# Patient Record
Sex: FEMALE | Race: WHITE | Employment: OTHER | URBAN - METROPOLITAN AREA
[De-identification: names, ages, dates, MRNs, and addresses within clinical notes are randomized per-mention and may not be internally consistent; named-entity substitution may affect disease eponyms.]

---

## 2017-02-07 RX ORDER — PANTOPRAZOLE SODIUM 40 MG/1
TABLET, DELAYED RELEASE ORAL
Qty: 30 TABLET | Refills: 5 | Status: SHIPPED | OUTPATIENT
Start: 2017-02-07 | End: 2017-03-28 | Stop reason: SDUPTHER

## 2017-03-21 ENCOUNTER — OFFICE VISIT (OUTPATIENT)
Dept: INTERNAL MEDICINE | Age: 82
End: 2017-03-21

## 2017-03-21 VITALS
RESPIRATION RATE: 16 BRPM | WEIGHT: 171 LBS | SYSTOLIC BLOOD PRESSURE: 118 MMHG | DIASTOLIC BLOOD PRESSURE: 58 MMHG | HEART RATE: 72 BPM | BODY MASS INDEX: 30.29 KG/M2

## 2017-03-21 DIAGNOSIS — E11.9 TYPE 2 DIABETES MELLITUS WITHOUT COMPLICATION, WITHOUT LONG-TERM CURRENT USE OF INSULIN (HCC): Primary | ICD-10-CM

## 2017-03-21 DIAGNOSIS — G45.8 OTHER SPECIFIED TRANSIENT CEREBRAL ISCHEMIAS: ICD-10-CM

## 2017-03-21 DIAGNOSIS — Z00.00 PREVENTATIVE HEALTH CARE: ICD-10-CM

## 2017-03-21 DIAGNOSIS — I10 ESSENTIAL HYPERTENSION, BENIGN: ICD-10-CM

## 2017-03-21 DIAGNOSIS — I35.0 NONRHEUMATIC AORTIC VALVE STENOSIS: ICD-10-CM

## 2017-03-21 PROCEDURE — G8510 SCR DEP NEG, NO PLAN REQD: HCPCS | Performed by: INTERNAL MEDICINE

## 2017-03-21 PROCEDURE — 99214 OFFICE O/P EST MOD 30 MIN: CPT | Performed by: INTERNAL MEDICINE

## 2017-03-21 ASSESSMENT — ENCOUNTER SYMPTOMS
SHORTNESS OF BREATH: 1
TROUBLE SWALLOWING: 0
SORE THROAT: 0
COUGH: 1
SINUS PRESSURE: 1
EYES NEGATIVE: 1
GASTROINTESTINAL NEGATIVE: 1
ALLERGIC/IMMUNOLOGIC NEGATIVE: 1

## 2017-03-21 ASSESSMENT — PATIENT HEALTH QUESTIONNAIRE - PHQ9
SUM OF ALL RESPONSES TO PHQ QUESTIONS 1-9: 0
1. LITTLE INTEREST OR PLEASURE IN DOING THINGS: 0
SUM OF ALL RESPONSES TO PHQ9 QUESTIONS 1 & 2: 0
2. FEELING DOWN, DEPRESSED OR HOPELESS: 0

## 2017-03-28 RX ORDER — PRAVASTATIN SODIUM 40 MG
40 TABLET ORAL DAILY
Qty: 30 TABLET | Refills: 5 | Status: SHIPPED | OUTPATIENT
Start: 2017-03-28 | End: 2018-04-30 | Stop reason: SDUPTHER

## 2017-03-28 RX ORDER — PANTOPRAZOLE SODIUM 40 MG/1
40 TABLET, DELAYED RELEASE ORAL DAILY
Qty: 30 TABLET | Refills: 5 | Status: SHIPPED | OUTPATIENT
Start: 2017-03-28 | End: 2018-04-24 | Stop reason: SDUPTHER

## 2017-03-28 RX ORDER — ERGOCALCIFEROL 1.25 MG/1
50000 CAPSULE ORAL WEEKLY
Qty: 4 CAPSULE | Refills: 5 | Status: SHIPPED | OUTPATIENT
Start: 2017-03-28 | End: 2018-02-07 | Stop reason: SDUPTHER

## 2017-06-20 ENCOUNTER — OFFICE VISIT (OUTPATIENT)
Dept: INTERNAL MEDICINE | Age: 82
End: 2017-06-20

## 2017-06-20 VITALS
SYSTOLIC BLOOD PRESSURE: 124 MMHG | BODY MASS INDEX: 29.76 KG/M2 | DIASTOLIC BLOOD PRESSURE: 70 MMHG | WEIGHT: 168 LBS | HEART RATE: 64 BPM | RESPIRATION RATE: 16 BRPM

## 2017-06-20 DIAGNOSIS — E78.00 HYPERCHOLESTEREMIA: Primary | ICD-10-CM

## 2017-06-20 DIAGNOSIS — E11.9 TYPE 2 DIABETES MELLITUS WITHOUT COMPLICATION, WITHOUT LONG-TERM CURRENT USE OF INSULIN (HCC): ICD-10-CM

## 2017-06-20 DIAGNOSIS — I25.10 ATHEROSCLEROSIS OF CORONARY ARTERY OF NATIVE HEART WITHOUT ANGINA PECTORIS, UNSPECIFIED VESSEL OR LESION TYPE: ICD-10-CM

## 2017-06-20 DIAGNOSIS — I10 ESSENTIAL HYPERTENSION, BENIGN: ICD-10-CM

## 2017-06-20 DIAGNOSIS — J45.20 MILD INTERMITTENT ASTHMA WITHOUT COMPLICATION: ICD-10-CM

## 2017-06-20 DIAGNOSIS — E55.9 VITAMIN D DEFICIENCY: ICD-10-CM

## 2017-06-20 LAB
A/G RATIO: 1.5 (ref 1.1–2.2)
ALBUMIN SERPL-MCNC: 4.3 G/DL (ref 3.4–5)
ALP BLD-CCNC: 63 U/L (ref 40–129)
ALT SERPL-CCNC: 19 U/L (ref 10–40)
ANION GAP SERPL CALCULATED.3IONS-SCNC: 16 MMOL/L (ref 3–16)
AST SERPL-CCNC: 29 U/L (ref 15–37)
BILIRUB SERPL-MCNC: 1.5 MG/DL (ref 0–1)
BUN BLDV-MCNC: 11 MG/DL (ref 7–20)
CALCIUM SERPL-MCNC: 9.4 MG/DL (ref 8.3–10.6)
CHLORIDE BLD-SCNC: 102 MMOL/L (ref 99–110)
CHOLESTEROL, TOTAL: 157 MG/DL (ref 0–199)
CO2: 22 MMOL/L (ref 21–32)
CREAT SERPL-MCNC: <0.5 MG/DL (ref 0.6–1.2)
GFR AFRICAN AMERICAN: >60
GFR NON-AFRICAN AMERICAN: >60
GLOBULIN: 2.8 G/DL
GLUCOSE BLD-MCNC: 104 MG/DL (ref 70–99)
HCT VFR BLD CALC: 45 % (ref 36–48)
HDLC SERPL-MCNC: 46 MG/DL (ref 40–60)
HEMOGLOBIN: 14.9 G/DL (ref 12–16)
LDL CHOLESTEROL CALCULATED: 83 MG/DL
MCH RBC QN AUTO: 30.6 PG (ref 26–34)
MCHC RBC AUTO-ENTMCNC: 33.2 G/DL (ref 31–36)
MCV RBC AUTO: 92 FL (ref 80–100)
PDW BLD-RTO: 13.7 % (ref 12.4–15.4)
PLATELET # BLD: 81 K/UL (ref 135–450)
PMV BLD AUTO: 10 FL (ref 5–10.5)
POTASSIUM SERPL-SCNC: 4 MMOL/L (ref 3.5–5.1)
RBC # BLD: 4.89 M/UL (ref 4–5.2)
SODIUM BLD-SCNC: 140 MMOL/L (ref 136–145)
TOTAL PROTEIN: 7.1 G/DL (ref 6.4–8.2)
TRIGL SERPL-MCNC: 142 MG/DL (ref 0–150)
VITAMIN D 25-HYDROXY: 28.9 NG/ML
VLDLC SERPL CALC-MCNC: 28 MG/DL
WBC # BLD: 4.9 K/UL (ref 4–11)

## 2017-06-20 PROCEDURE — 36415 COLL VENOUS BLD VENIPUNCTURE: CPT | Performed by: INTERNAL MEDICINE

## 2017-06-20 PROCEDURE — 99214 OFFICE O/P EST MOD 30 MIN: CPT | Performed by: INTERNAL MEDICINE

## 2017-06-20 RX ORDER — IPRATROPIUM BROMIDE 42 UG/1
2 SPRAY, METERED NASAL 4 TIMES DAILY PRN
COMMUNITY
End: 2017-10-25 | Stop reason: SDUPTHER

## 2017-06-20 ASSESSMENT — ENCOUNTER SYMPTOMS
EYES NEGATIVE: 1
SINUS PRESSURE: 1
ALLERGIC/IMMUNOLOGIC NEGATIVE: 1
GASTROINTESTINAL NEGATIVE: 1
COUGH: 1
SHORTNESS OF BREATH: 1
TROUBLE SWALLOWING: 0
SORE THROAT: 0

## 2017-06-21 LAB
ESTIMATED AVERAGE GLUCOSE: 114 MG/DL
HBA1C MFR BLD: 5.6 %

## 2017-09-01 ENCOUNTER — OFFICE VISIT (OUTPATIENT)
Dept: INTERNAL MEDICINE | Age: 82
End: 2017-09-01

## 2017-09-01 VITALS
BODY MASS INDEX: 29.53 KG/M2 | HEART RATE: 57 BPM | DIASTOLIC BLOOD PRESSURE: 82 MMHG | SYSTOLIC BLOOD PRESSURE: 118 MMHG | HEIGHT: 64 IN | WEIGHT: 173 LBS | OXYGEN SATURATION: 98 %

## 2017-09-01 DIAGNOSIS — Z12.31 SCREENING MAMMOGRAM, ENCOUNTER FOR: ICD-10-CM

## 2017-09-01 DIAGNOSIS — E78.00 HYPERCHOLESTEREMIA: ICD-10-CM

## 2017-09-01 DIAGNOSIS — N20.0 NEPHROLITHIASIS: ICD-10-CM

## 2017-09-01 DIAGNOSIS — E11.9 TYPE 2 DIABETES MELLITUS WITHOUT COMPLICATION, WITHOUT LONG-TERM CURRENT USE OF INSULIN (HCC): ICD-10-CM

## 2017-09-01 DIAGNOSIS — G45.8 OTHER SPECIFIED TRANSIENT CEREBRAL ISCHEMIAS: ICD-10-CM

## 2017-09-01 DIAGNOSIS — Z95.2 S/P AVR (AORTIC VALVE REPLACEMENT): ICD-10-CM

## 2017-09-01 DIAGNOSIS — Z00.00 MEDICARE ANNUAL WELLNESS VISIT, SUBSEQUENT: Primary | ICD-10-CM

## 2017-09-01 DIAGNOSIS — H92.01 RIGHT EAR PAIN: ICD-10-CM

## 2017-09-01 PROCEDURE — G0439 PPPS, SUBSEQ VISIT: HCPCS | Performed by: INTERNAL MEDICINE

## 2017-09-01 ASSESSMENT — LIFESTYLE VARIABLES
HAS A RELATIVE, FRIEND, DOCTOR, OR ANOTHER HEALTH PROFESSIONAL EXPRESSED CONCERN ABOUT YOUR DRINKING OR SUGGESTED YOU CUT DOWN: 0
HOW OFTEN DO YOU HAVE A DRINK CONTAINING ALCOHOL: 1
HOW OFTEN DURING THE LAST YEAR HAVE YOU FAILED TO DO WHAT WAS NORMALLY EXPECTED FROM YOU BECAUSE OF DRINKING: 0
HOW OFTEN DURING THE LAST YEAR HAVE YOU HAD A FEELING OF GUILT OR REMORSE AFTER DRINKING: 0
HOW OFTEN DO YOU HAVE SIX OR MORE DRINKS ON ONE OCCASION: 0
AUDIT-C TOTAL SCORE: 1
HOW OFTEN DURING THE LAST YEAR HAVE YOU FOUND THAT YOU WERE NOT ABLE TO STOP DRINKING ONCE YOU HAD STARTED: 0
HOW OFTEN DURING THE LAST YEAR HAVE YOU NEEDED AN ALCOHOLIC DRINK FIRST THING IN THE MORNING TO GET YOURSELF GOING AFTER A NIGHT OF HEAVY DRINKING: 0
HOW OFTEN DURING THE LAST YEAR HAVE YOU BEEN UNABLE TO REMEMBER WHAT HAPPENED THE NIGHT BEFORE BECAUSE YOU HAD BEEN DRINKING: 0
HOW MANY STANDARD DRINKS CONTAINING ALCOHOL DO YOU HAVE ON A TYPICAL DAY: 0

## 2017-09-01 ASSESSMENT — ENCOUNTER SYMPTOMS
CHEST TIGHTNESS: 0
NAUSEA: 0
DIARRHEA: 0
SHORTNESS OF BREATH: 0
VOMITING: 0
COUGH: 0
ABDOMINAL PAIN: 0
BLOOD IN STOOL: 0

## 2017-09-01 ASSESSMENT — ANXIETY QUESTIONNAIRES: GAD7 TOTAL SCORE: 1

## 2017-09-01 ASSESSMENT — PATIENT HEALTH QUESTIONNAIRE - PHQ9: SUM OF ALL RESPONSES TO PHQ QUESTIONS 1-9: 0

## 2017-09-05 ENCOUNTER — OFFICE VISIT (OUTPATIENT)
Dept: CARDIOLOGY CLINIC | Age: 82
End: 2017-09-05

## 2017-09-05 VITALS
BODY MASS INDEX: 29.87 KG/M2 | DIASTOLIC BLOOD PRESSURE: 68 MMHG | WEIGHT: 174 LBS | HEART RATE: 72 BPM | SYSTOLIC BLOOD PRESSURE: 140 MMHG

## 2017-09-05 DIAGNOSIS — Z95.2 S/P AVR (AORTIC VALVE REPLACEMENT): ICD-10-CM

## 2017-09-05 DIAGNOSIS — I25.10 ATHEROSCLEROSIS OF CORONARY ARTERY OF NATIVE HEART WITHOUT ANGINA PECTORIS, UNSPECIFIED VESSEL OR LESION TYPE: Primary | ICD-10-CM

## 2017-09-05 PROCEDURE — 99213 OFFICE O/P EST LOW 20 MIN: CPT | Performed by: INTERNAL MEDICINE

## 2017-10-25 ENCOUNTER — NURSE ONLY (OUTPATIENT)
Dept: INTERNAL MEDICINE | Age: 82
End: 2017-10-25

## 2017-10-25 ENCOUNTER — TELEPHONE (OUTPATIENT)
Dept: INTERNAL MEDICINE | Age: 82
End: 2017-10-25

## 2017-10-25 DIAGNOSIS — Z23 NEED FOR INFLUENZA VACCINATION: Primary | ICD-10-CM

## 2017-10-25 PROCEDURE — G0008 ADMIN INFLUENZA VIRUS VAC: HCPCS | Performed by: INTERNAL MEDICINE

## 2017-10-25 PROCEDURE — 90662 IIV NO PRSV INCREASED AG IM: CPT | Performed by: INTERNAL MEDICINE

## 2017-10-25 RX ORDER — IPRATROPIUM BROMIDE 42 UG/1
2 SPRAY, METERED NASAL 4 TIMES DAILY PRN
Qty: 1 BOTTLE | Refills: 5 | Status: ON HOLD | OUTPATIENT
Start: 2017-10-25 | End: 2019-01-05 | Stop reason: CLARIF

## 2017-10-25 NOTE — PROGRESS NOTES
Vaccine Information Sheet, \"Influenza - Inactivated\"  given to Ryan Hernandez, or parent/legal guardian of  Ryan Hernandez and verbalized understanding. Patient responses:    Have you ever had a reaction to a flu vaccine? No  Are you able to eat eggs without adverse effects? Yes  Do you have any current illness? No  Have you ever had [place] Syndrome? No    Flu vaccine given per order. Please see immunization tab.

## 2018-01-02 ENCOUNTER — TELEPHONE (OUTPATIENT)
Dept: INTERNAL MEDICINE | Age: 83
End: 2018-01-02

## 2018-01-02 ENCOUNTER — OFFICE VISIT (OUTPATIENT)
Dept: INTERNAL MEDICINE | Age: 83
End: 2018-01-02

## 2018-01-02 VITALS
BODY MASS INDEX: 30.73 KG/M2 | SYSTOLIC BLOOD PRESSURE: 150 MMHG | OXYGEN SATURATION: 98 % | HEART RATE: 69 BPM | DIASTOLIC BLOOD PRESSURE: 80 MMHG | WEIGHT: 179 LBS

## 2018-01-02 DIAGNOSIS — E78.00 HYPERCHOLESTEREMIA: ICD-10-CM

## 2018-01-02 DIAGNOSIS — Z95.2 S/P AVR (AORTIC VALVE REPLACEMENT): ICD-10-CM

## 2018-01-02 DIAGNOSIS — E11.9 TYPE 2 DIABETES MELLITUS WITHOUT COMPLICATION, WITHOUT LONG-TERM CURRENT USE OF INSULIN (HCC): ICD-10-CM

## 2018-01-02 DIAGNOSIS — E11.9 TYPE 2 DIABETES MELLITUS WITHOUT COMPLICATION, WITHOUT LONG-TERM CURRENT USE OF INSULIN (HCC): Primary | ICD-10-CM

## 2018-01-02 DIAGNOSIS — I25.10 ATHEROSCLEROSIS OF CORONARY ARTERY OF NATIVE HEART WITHOUT ANGINA PECTORIS, UNSPECIFIED VESSEL OR LESION TYPE: ICD-10-CM

## 2018-01-02 DIAGNOSIS — I10 ESSENTIAL HYPERTENSION, BENIGN: ICD-10-CM

## 2018-01-02 LAB
A/G RATIO: 1.6 (ref 1.1–2.2)
ALBUMIN SERPL-MCNC: 4.3 G/DL (ref 3.4–5)
ALP BLD-CCNC: 73 U/L (ref 40–129)
ALT SERPL-CCNC: 28 U/L (ref 10–40)
ANION GAP SERPL CALCULATED.3IONS-SCNC: 13 MMOL/L (ref 3–16)
AST SERPL-CCNC: 36 U/L (ref 15–37)
BILIRUB SERPL-MCNC: 1.1 MG/DL (ref 0–1)
BUN BLDV-MCNC: 9 MG/DL (ref 7–20)
CALCIUM SERPL-MCNC: 9.3 MG/DL (ref 8.3–10.6)
CHLORIDE BLD-SCNC: 103 MMOL/L (ref 99–110)
CHOLESTEROL, TOTAL: 161 MG/DL (ref 0–199)
CO2: 26 MMOL/L (ref 21–32)
CREAT SERPL-MCNC: 0.5 MG/DL (ref 0.6–1.2)
GFR AFRICAN AMERICAN: >60
GFR NON-AFRICAN AMERICAN: >60
GLOBULIN: 2.7 G/DL
GLUCOSE BLD-MCNC: 119 MG/DL (ref 70–99)
HCT VFR BLD CALC: 45.3 % (ref 36–48)
HDLC SERPL-MCNC: 39 MG/DL (ref 40–60)
HEMOGLOBIN: 15.4 G/DL (ref 12–16)
LDL CHOLESTEROL CALCULATED: 85 MG/DL
MCH RBC QN AUTO: 31.5 PG (ref 26–34)
MCHC RBC AUTO-ENTMCNC: 34.1 G/DL (ref 31–36)
MCV RBC AUTO: 92.4 FL (ref 80–100)
PDW BLD-RTO: 14.1 % (ref 12.4–15.4)
PLATELET # BLD: 87 K/UL (ref 135–450)
PMV BLD AUTO: 10.1 FL (ref 5–10.5)
POTASSIUM SERPL-SCNC: 5.2 MMOL/L (ref 3.5–5.1)
RBC # BLD: 4.91 M/UL (ref 4–5.2)
SODIUM BLD-SCNC: 142 MMOL/L (ref 136–145)
TOTAL PROTEIN: 7 G/DL (ref 6.4–8.2)
TRIGL SERPL-MCNC: 183 MG/DL (ref 0–150)
VLDLC SERPL CALC-MCNC: 37 MG/DL
WBC # BLD: 5.5 K/UL (ref 4–11)

## 2018-01-02 PROCEDURE — 99214 OFFICE O/P EST MOD 30 MIN: CPT | Performed by: INTERNAL MEDICINE

## 2018-01-02 RX ORDER — AMLODIPINE BESYLATE 5 MG/1
5 TABLET ORAL DAILY
Qty: 30 TABLET | Refills: 2 | Status: SHIPPED | OUTPATIENT
Start: 2018-01-02 | End: 2018-01-05 | Stop reason: SDUPTHER

## 2018-01-02 ASSESSMENT — ENCOUNTER SYMPTOMS
SHORTNESS OF BREATH: 0
CHEST TIGHTNESS: 0
ABDOMINAL PAIN: 0
NAUSEA: 0
VOMITING: 0

## 2018-01-02 NOTE — PROGRESS NOTES
Outpatient Note for established Patient - regular Visit    History Obtained From:  patient, electronic medical record  Is the patient new to me ? - No    HISTORY OF PRESENT ILLNESS:   The patient is a 80 y.o. female who is here today for :    1) Diabetes Mellitus  Pt denies CP/SOB/palpitations/abdominal pain/N/V. Last HbA1C:  Lab Results   Component Value Date    LABA1C 5.6 06/20/2017    LABA1C 5.7 12/20/2016    LABA1C 5.7 05/17/2016   :        Target A1C : <7.0-7.5  Pt checks glucose?  Occasionally (usuall number ~110)      Pt is compliant with meds: Yes: Victoza     Hypoglycemia events(Sx or low readings) : No     Lifestyle:   Physical activity: mild physical activity       Known target organ damage:   - CVD: Yes   - Nephropathy: No         Lab Results   Component Value Date    GFR >60 10/09/2012    GFR >60 04/30/2012    GFR >60 11/18/2011        Urine albumin/Cr I will order    - Diabetic retinopathy:No   Last eye specialist exam: this year    - Peripheral/Autonomic neuropathy: No     Foot exam performed: Yes    Pt on HIGH DOSE statin ?- mod dose       2) Hypertension:   BP readings at home: no    Pt is compliant with medications- Yes   Associated Sx (headache/ vision changes etc)- No      3) Hypercholesterolemia  No components found for: CHLPL  Lab Results   Component Value Date    TRIG 142 06/20/2017     Lab Results   Component Value Date    HDL 46 06/20/2017     Lab Results   Component Value Date    LDLCALC 83 06/20/2017     Lab Results   Component Value Date    LABVLDL 28 06/20/2017     4) S/p Aortic valve replacement, on Aspirin   History of aortic valve replacement she denies any symptoms of chest pain or short of breath she denies paroxysmal nocturnal dyspnea orthopnea or dyspnea on exertion    Past Medical History:        Diagnosis Date    Allergic rhinitis, cause unspecified     Allergic urticaria     Angioneurotic edema not elsewhere classified     Aortic stenosis 12/9/2011    dx 11/2007 - echo 3/28/17   Laurie Betts MD   Liraglutide (VICTOZA) 18 MG/3ML SOPN SC injection DIAL AND INJECT SUBCUTANEOUSLY 1.2MG DAILY 3/28/17   Laurie Betts MD   Biotin (BIOTIN 5000) 5 MG CAPS Take 1 capsule by mouth daily    Historical Provider, MD   [address] [facility] [place] [facility] [place] strip TEST BLOOD SUGAR ONE TIME A DAY OR AS DIRECTED BY PHYSICIAN. 5/20/15   Laurie Betts MD   aspirin 81 MG tablet Take 81 mg by mouth daily. Historical Provider, MD       REVIEW OF SYSTEMS:  Review of Systems   Constitutional: Negative for chills and fever. Respiratory: Negative for chest tightness and shortness of breath. Cardiovascular: Negative for chest pain. Gastrointestinal: Negative for abdominal pain, nausea and vomiting. Physical Exam:      Vitals: BP (!) 140/80 (Site: Right Arm, Position: Sitting, Cuff Size: Small Adult)   Pulse 69   Wt 179 lb (81.2 kg)   SpO2 98%   BMI 30.73 kg/m²     Body mass index is 30.73 kg/m². Wt Readings from Last 3 Encounters:   01/02/18 179 lb (81.2 kg)   09/05/17 174 lb (78.9 kg)   09/01/17 173 lb (78.5 kg)     Physical Exam   Constitutional: She is oriented to person, place, and time. She appears well-developed and well-nourished. No distress. HENT:   Head: Normocephalic and atraumatic. Right Ear: External ear normal.   Left Ear: External ear normal.   Mouth/Throat: Oropharynx is clear and moist. No oropharyngeal exudate. Eyes: Conjunctivae and EOM are normal. Right eye exhibits no discharge. Left eye exhibits no discharge. No scleral icterus. Neck: Normal range of motion. Neck supple. Cardiovascular: Normal rate and normal heart sounds. No murmur heard. Pulmonary/Chest: Effort normal and breath sounds normal. No respiratory distress. She has no wheezes. She has no rales. Abdominal: Soft. There is no tenderness. Musculoskeletal: Normal range of motion. She exhibits no edema. Feet:  No sensory- motor deficit  No wounds/callous/ cuts or rash  Nails: trimmed.    I recommended daily foot exam, feet protection. Lymphadenopathy:     She has no cervical adenopathy. Neurological: She is alert and oriented to person, place, and time. She has normal reflexes. She exhibits normal muscle tone. Skin: No rash noted. She is not diaphoretic. No erythema. Psychiatric: She has a normal mood and affect. Her behavior is normal. Thought content normal.        Assessment/Plan:   Ada was seen today for hyperlipidemia and diabetes. Diagnoses and all orders for this visit:    Type 2 diabetes mellitus without complication, without long-term current use of insulin (HCC)  Well controlled- no changes in medication today. -     Hemoglobin A1C; Future  -     Lipid panel; Future  -     HM DIABETES FOOT EXAM  -     Microalbumin / creatinine urine ratio; Future  -     COMPREHENSIVE METABOLIC PANEL; Future  -     CBC; Future    Hypertension  Not controlled. She will recheck at home and call with results   -     Lipid panel; Future  -     COMPREHENSIVE METABOLIC PANEL; Future  -     CBC; Future    Hypercholesteremia  She is on Aspirin, BB   Well controlled- no changes in medication today. -     Lipid panel; Future    Atherosclerosis of coronary artery of native heart without angina pectoris, unspecified vessel or lesion type  -     Lipid panel; Future    S/P AVR (aortic valve replacement)  -     COMPREHENSIVE METABOLIC PANEL; Future  -     CBC; Future    Other orders  -     Cancel: MICROALBUMIN, URINE, 24 HOUR; Future      Quality & Risk Score Accuracy - MEDICARE ADVANTAGE    Last edited 01/02/18 08:43 EST by Pina Flor MD       - Patient was encouraged to call the office or be seen with MD for any worsening or lack    of improvement in his symptoms.   - Anticipated follow up in 4 month(s)    Additional patients instructions (if given):   Patient Instructions   1. Recheck blood pressure after taking medications.    Please call me if it is higher than 140/80       Pina Flor M.D.

## 2018-01-02 NOTE — TELEPHONE ENCOUNTER
Pt states Dr wanted her to call with BP. Pt took medication at 09:30am and then took BP around 11:30 and was 166/91. Pt states BP is not going down.  Please advise

## 2018-01-03 ENCOUNTER — OFFICE VISIT (OUTPATIENT)
Dept: CARDIOLOGY CLINIC | Age: 83
End: 2018-01-03

## 2018-01-03 VITALS
SYSTOLIC BLOOD PRESSURE: 138 MMHG | BODY MASS INDEX: 30.66 KG/M2 | HEART RATE: 74 BPM | WEIGHT: 178.6 LBS | DIASTOLIC BLOOD PRESSURE: 64 MMHG

## 2018-01-03 DIAGNOSIS — I10 ESSENTIAL HYPERTENSION, BENIGN: Primary | ICD-10-CM

## 2018-01-03 DIAGNOSIS — E78.00 HYPERCHOLESTEREMIA: ICD-10-CM

## 2018-01-03 DIAGNOSIS — Z95.2 S/P AVR (AORTIC VALVE REPLACEMENT): ICD-10-CM

## 2018-01-03 DIAGNOSIS — I25.10 ATHEROSCLEROSIS OF CORONARY ARTERY OF NATIVE HEART WITHOUT ANGINA PECTORIS, UNSPECIFIED VESSEL OR LESION TYPE: ICD-10-CM

## 2018-01-03 LAB
ESTIMATED AVERAGE GLUCOSE: 134.1 MG/DL
HBA1C MFR BLD: 6.3 %

## 2018-01-03 PROCEDURE — 99214 OFFICE O/P EST MOD 30 MIN: CPT | Performed by: NURSE PRACTITIONER

## 2018-01-03 NOTE — PROGRESS NOTES
SURGERY  1977    Disc  adjustment    CARDIAC CATHETERIZATION  6/6/2013     Dr. Britni Coates - coronaries WNL , LVEF WNL     CATARACT REMOVAL  5/2012    Dr. Jessie Myers - bilateral    Maria L Sensing    Dr. Paxton Sheppard    COLONOSCOPY  03/30/2011    DR. Mello  - Internal hemorrhoids, sigmoid diverticulosis, hyperplastic polyps. Repeat in five  years.  COLONOSCOPY  02/23/2009    nternal hemorrhoids, Diverticulosis, Colon polyps. Repeat two years    COLONOSCOPY  5/8/2013     Dr. Joseph Brunson  - internal hemorrhoids , diverticulosis and polyps     COLONOSCOPY  5/4/2015, 3/7/2016    - hemorrhoids, diverticulosis, hyperplastic colon polyps, repeat in 1 year   Harry Strong  [id number]     DR. Dacosta - CYSTOSCOPY RETROGRADE, RIGHT STENT PLACEMENT    OTHER SURGICAL HISTORY  6/27/2013    Dr. Stefanie Maynard - [facility] URETEROSCOPY, RENAL STONE EXTRACTION    UPPER GASTROINTESTINAL ENDOSCOPY  3/7/2016    gastritis and esophagitis      Family History   Problem Relation Age of Onset    Heart Disease Father     Cancer Sister      pancreatic cancer    Heart Disease Brother     Heart Disease Brother     Heart Disease Brother      Social History   Substance Use Topics    Smoking status: Never Smoker    Smokeless tobacco: Never Used    Alcohol use 0.0 oz/week      Comment: Occasional glass of wine     Allergies:Actos [pioglitazone hydrochloride]; Lisinopril; Penicillins; Lipitor; Zocor [simvastatin]; and Glucophage [metformin hydrochloride]    Review of Systems  General: No changes in weight, fatigue, or night sweats. HEENT: No blurry or decreased vision. No changes in hearing, nasal discharge or sore throat. Cardiovascular:  See HPI. Respiratory: No cough, hemoptysis, or wheezing.  + history of asthma. Gastrointestinal:  No abdominal pain, hematochezia, melana, constipation, diarrhea, or history of GI ulcers. Genito-Urinary: No dysuria or hematuria. No urgency or polyuria.   Musculoskeletal:  No complaints

## 2018-01-04 DIAGNOSIS — D69.1 THROMBOCYTOPATHIA (HCC): Primary | ICD-10-CM

## 2018-01-05 DIAGNOSIS — I10 ESSENTIAL HYPERTENSION, BENIGN: ICD-10-CM

## 2018-01-05 RX ORDER — AMLODIPINE BESYLATE 10 MG/1
10 TABLET ORAL DAILY
Qty: 30 TABLET | Refills: 1 | Status: SHIPPED | OUTPATIENT
Start: 2018-01-05 | End: 2018-02-28 | Stop reason: SDUPTHER

## 2018-01-24 ENCOUNTER — OFFICE VISIT (OUTPATIENT)
Dept: INTERNAL MEDICINE | Age: 83
End: 2018-01-24

## 2018-01-24 VITALS
HEIGHT: 64 IN | HEART RATE: 67 BPM | BODY MASS INDEX: 30.22 KG/M2 | SYSTOLIC BLOOD PRESSURE: 120 MMHG | OXYGEN SATURATION: 98 % | DIASTOLIC BLOOD PRESSURE: 60 MMHG | WEIGHT: 177 LBS

## 2018-01-24 DIAGNOSIS — E78.00 HYPERCHOLESTEREMIA: ICD-10-CM

## 2018-01-24 DIAGNOSIS — I10 ESSENTIAL HYPERTENSION, BENIGN: Primary | ICD-10-CM

## 2018-01-24 DIAGNOSIS — M89.8X1 SHOULDER BLADE PAIN: ICD-10-CM

## 2018-01-24 PROCEDURE — 99213 OFFICE O/P EST LOW 20 MIN: CPT | Performed by: INTERNAL MEDICINE

## 2018-01-24 ASSESSMENT — ENCOUNTER SYMPTOMS
CHEST TIGHTNESS: 0
SHORTNESS OF BREATH: 0
ABDOMINAL PAIN: 0
VOMITING: 0
NAUSEA: 0

## 2018-02-07 DIAGNOSIS — E55.9 VITAMIN D DEFICIENCY: Primary | ICD-10-CM

## 2018-02-07 RX ORDER — ERGOCALCIFEROL 1.25 MG/1
50000 CAPSULE ORAL WEEKLY
Qty: 4 CAPSULE | Refills: 5 | Status: SHIPPED | OUTPATIENT
Start: 2018-02-07 | End: 2018-04-23 | Stop reason: SDUPTHER

## 2018-02-28 DIAGNOSIS — I10 ESSENTIAL HYPERTENSION, BENIGN: ICD-10-CM

## 2018-02-28 RX ORDER — AMLODIPINE BESYLATE 10 MG/1
10 TABLET ORAL DAILY
Qty: 30 TABLET | Refills: 5 | Status: SHIPPED | OUTPATIENT
Start: 2018-02-28 | End: 2018-05-14 | Stop reason: DRUGHIGH

## 2018-04-23 DIAGNOSIS — E55.9 VITAMIN D DEFICIENCY: ICD-10-CM

## 2018-04-23 RX ORDER — ERGOCALCIFEROL 1.25 MG/1
50000 CAPSULE ORAL WEEKLY
Qty: 4 CAPSULE | Refills: 5 | Status: SHIPPED | OUTPATIENT
Start: 2018-04-23 | End: 2019-02-27 | Stop reason: SDUPTHER

## 2018-04-24 ENCOUNTER — OFFICE VISIT (OUTPATIENT)
Dept: INTERNAL MEDICINE | Age: 83
End: 2018-04-24

## 2018-04-24 VITALS
HEART RATE: 72 BPM | OXYGEN SATURATION: 97 % | DIASTOLIC BLOOD PRESSURE: 64 MMHG | WEIGHT: 176 LBS | SYSTOLIC BLOOD PRESSURE: 122 MMHG | BODY MASS INDEX: 30.21 KG/M2

## 2018-04-24 DIAGNOSIS — I25.10 ATHEROSCLEROSIS OF CORONARY ARTERY OF NATIVE HEART WITHOUT ANGINA PECTORIS, UNSPECIFIED VESSEL OR LESION TYPE: ICD-10-CM

## 2018-04-24 DIAGNOSIS — E78.00 HYPERCHOLESTEREMIA: ICD-10-CM

## 2018-04-24 DIAGNOSIS — I10 ESSENTIAL HYPERTENSION, BENIGN: Primary | ICD-10-CM

## 2018-04-24 DIAGNOSIS — E11.9 TYPE 2 DIABETES MELLITUS WITHOUT COMPLICATION, WITHOUT LONG-TERM CURRENT USE OF INSULIN (HCC): ICD-10-CM

## 2018-04-24 DIAGNOSIS — M25.552 LEFT HIP PAIN: ICD-10-CM

## 2018-04-24 DIAGNOSIS — K27.9 PEPTIC ULCER: Primary | ICD-10-CM

## 2018-04-24 DIAGNOSIS — Z95.2 S/P AVR (AORTIC VALVE REPLACEMENT): ICD-10-CM

## 2018-04-24 PROCEDURE — 99214 OFFICE O/P EST MOD 30 MIN: CPT | Performed by: INTERNAL MEDICINE

## 2018-04-24 RX ORDER — PANTOPRAZOLE SODIUM 40 MG/1
40 TABLET, DELAYED RELEASE ORAL DAILY
Qty: 30 TABLET | Refills: 5 | Status: SHIPPED | OUTPATIENT
Start: 2018-04-24 | End: 2018-04-27 | Stop reason: SDUPTHER

## 2018-04-24 ASSESSMENT — ENCOUNTER SYMPTOMS
SHORTNESS OF BREATH: 0
NAUSEA: 0
ABDOMINAL PAIN: 0
VOMITING: 0
CHEST TIGHTNESS: 0

## 2018-04-27 ENCOUNTER — TELEPHONE (OUTPATIENT)
Dept: INTERNAL MEDICINE | Age: 83
End: 2018-04-27

## 2018-04-27 DIAGNOSIS — K27.9 PEPTIC ULCER: ICD-10-CM

## 2018-04-27 RX ORDER — PANTOPRAZOLE SODIUM 40 MG/1
40 TABLET, DELAYED RELEASE ORAL DAILY
Qty: 30 TABLET | Refills: 5 | Status: SHIPPED | OUTPATIENT
Start: 2018-04-27 | End: 2018-04-30 | Stop reason: SDUPTHER

## 2018-04-30 ENCOUNTER — TELEPHONE (OUTPATIENT)
Dept: INTERNAL MEDICINE | Age: 83
End: 2018-04-30

## 2018-04-30 DIAGNOSIS — E78.00 HYPERCHOLESTEREMIA: Primary | ICD-10-CM

## 2018-04-30 RX ORDER — PRAVASTATIN SODIUM 40 MG
40 TABLET ORAL DAILY
Qty: 30 TABLET | Refills: 5 | Status: SHIPPED | OUTPATIENT
Start: 2018-04-30 | End: 2018-07-30 | Stop reason: SDUPTHER

## 2018-04-30 RX ORDER — PANTOPRAZOLE SODIUM 40 MG/1
40 TABLET, DELAYED RELEASE ORAL DAILY
Qty: 30 TABLET | Refills: 5 | Status: SHIPPED | OUTPATIENT
Start: 2018-04-30 | End: 2018-04-30 | Stop reason: CLARIF

## 2018-05-14 ENCOUNTER — OFFICE VISIT (OUTPATIENT)
Dept: INTERNAL MEDICINE | Age: 83
End: 2018-05-14

## 2018-05-14 VITALS
WEIGHT: 174 LBS | BODY MASS INDEX: 29.87 KG/M2 | OXYGEN SATURATION: 96 % | DIASTOLIC BLOOD PRESSURE: 62 MMHG | SYSTOLIC BLOOD PRESSURE: 122 MMHG | HEART RATE: 66 BPM

## 2018-05-14 DIAGNOSIS — M79.89 LEG SWELLING: Primary | ICD-10-CM

## 2018-05-14 DIAGNOSIS — R06.09 EXERTIONAL DYSPNEA: ICD-10-CM

## 2018-05-14 DIAGNOSIS — Z95.2 S/P AVR (AORTIC VALVE REPLACEMENT): ICD-10-CM

## 2018-05-14 DIAGNOSIS — I25.10 ATHEROSCLEROSIS OF CORONARY ARTERY OF NATIVE HEART WITHOUT ANGINA PECTORIS, UNSPECIFIED VESSEL OR LESION TYPE: ICD-10-CM

## 2018-05-14 DIAGNOSIS — I10 ESSENTIAL HYPERTENSION, BENIGN: ICD-10-CM

## 2018-05-14 PROCEDURE — 99214 OFFICE O/P EST MOD 30 MIN: CPT | Performed by: INTERNAL MEDICINE

## 2018-05-14 RX ORDER — AMLODIPINE BESYLATE 10 MG/1
5 TABLET ORAL DAILY
Qty: 30 TABLET | Refills: 0 | Status: SHIPPED | OUTPATIENT
Start: 2018-05-14 | End: 2018-05-22 | Stop reason: SDUPTHER

## 2018-05-14 ASSESSMENT — ENCOUNTER SYMPTOMS
VOMITING: 0
CHEST TIGHTNESS: 0
ABDOMINAL PAIN: 0
SHORTNESS OF BREATH: 1
NAUSEA: 0
BLOOD IN STOOL: 0

## 2018-05-22 ENCOUNTER — HOSPITAL ENCOUNTER (OUTPATIENT)
Dept: NON INVASIVE DIAGNOSTICS | Age: 83
Discharge: HOME | End: 2018-05-22
Attending: INTERNAL MEDICINE | Admitting: INTERNAL MEDICINE

## 2018-05-22 DIAGNOSIS — I10 ESSENTIAL (PRIMARY) HYPERTENSION: ICD-10-CM

## 2018-05-22 DIAGNOSIS — I10 ESSENTIAL HYPERTENSION, BENIGN: ICD-10-CM

## 2018-05-22 LAB
LV EF: 63 %
LVEF MODALITY: NORMAL

## 2018-05-23 RX ORDER — AMLODIPINE BESYLATE 10 MG/1
5 TABLET ORAL DAILY
Qty: 45 TABLET | Refills: 3 | Status: ON HOLD | OUTPATIENT
Start: 2018-05-23 | End: 2019-01-05 | Stop reason: CLARIF

## 2018-06-26 DIAGNOSIS — E11.9 TYPE 2 DIABETES MELLITUS WITHOUT COMPLICATION, WITHOUT LONG-TERM CURRENT USE OF INSULIN (HCC): Primary | ICD-10-CM

## 2018-07-30 DIAGNOSIS — E78.00 HYPERCHOLESTEREMIA: ICD-10-CM

## 2018-07-30 RX ORDER — PRAVASTATIN SODIUM 40 MG
40 TABLET ORAL DAILY
Qty: 30 TABLET | Refills: 5 | Status: SHIPPED | OUTPATIENT
Start: 2018-07-30 | End: 2019-01-08 | Stop reason: SDUPTHER

## 2018-08-14 DIAGNOSIS — E11.9 TYPE 2 DIABETES MELLITUS WITHOUT COMPLICATION, WITHOUT LONG-TERM CURRENT USE OF INSULIN (HCC): ICD-10-CM

## 2018-08-14 DIAGNOSIS — I10 ESSENTIAL HYPERTENSION, BENIGN: ICD-10-CM

## 2018-08-14 DIAGNOSIS — E78.00 HYPERCHOLESTEREMIA: ICD-10-CM

## 2018-08-14 LAB
A/G RATIO: 1.5 (ref 1.1–2.2)
ALBUMIN SERPL-MCNC: 4.3 G/DL (ref 3.4–5)
ALP BLD-CCNC: 68 U/L (ref 40–129)
ALT SERPL-CCNC: 24 U/L (ref 10–40)
ANION GAP SERPL CALCULATED.3IONS-SCNC: 11 MMOL/L (ref 3–16)
AST SERPL-CCNC: 35 U/L (ref 15–37)
BASOPHILS ABSOLUTE: 0 K/UL (ref 0–0.2)
BASOPHILS RELATIVE PERCENT: 0.7 %
BILIRUB SERPL-MCNC: 1.2 MG/DL (ref 0–1)
BUN BLDV-MCNC: 9 MG/DL (ref 7–20)
CALCIUM SERPL-MCNC: 9.6 MG/DL (ref 8.3–10.6)
CHLORIDE BLD-SCNC: 105 MMOL/L (ref 99–110)
CHOLESTEROL, TOTAL: 159 MG/DL (ref 0–199)
CO2: 28 MMOL/L (ref 21–32)
CREAT SERPL-MCNC: 0.6 MG/DL (ref 0.6–1.2)
EOSINOPHILS ABSOLUTE: 0.2 K/UL (ref 0–0.6)
EOSINOPHILS RELATIVE PERCENT: 4.2 %
ESTIMATED AVERAGE GLUCOSE: 125.5 MG/DL
GFR AFRICAN AMERICAN: >60
GFR NON-AFRICAN AMERICAN: >60
GLOBULIN: 2.8 G/DL
GLUCOSE BLD-MCNC: 109 MG/DL (ref 70–99)
HBA1C MFR BLD: 6 %
HCT VFR BLD CALC: 46.3 % (ref 36–48)
HDLC SERPL-MCNC: 46 MG/DL (ref 40–60)
HEMOGLOBIN: 15.7 G/DL (ref 12–16)
LDL CHOLESTEROL CALCULATED: 83 MG/DL
LYMPHOCYTES ABSOLUTE: 1.6 K/UL (ref 1–5.1)
LYMPHOCYTES RELATIVE PERCENT: 26.8 %
MCH RBC QN AUTO: 31 PG (ref 26–34)
MCHC RBC AUTO-ENTMCNC: 33.8 G/DL (ref 31–36)
MCV RBC AUTO: 91.6 FL (ref 80–100)
MONOCYTES ABSOLUTE: 0.4 K/UL (ref 0–1.3)
MONOCYTES RELATIVE PERCENT: 7 %
NEUTROPHILS ABSOLUTE: 3.6 K/UL (ref 1.7–7.7)
NEUTROPHILS RELATIVE PERCENT: 61.3 %
PDW BLD-RTO: 13.9 % (ref 12.4–15.4)
PLATELET # BLD: 98 K/UL (ref 135–450)
PMV BLD AUTO: 9.8 FL (ref 5–10.5)
POTASSIUM SERPL-SCNC: 4.4 MMOL/L (ref 3.5–5.1)
RBC # BLD: 5.06 M/UL (ref 4–5.2)
SODIUM BLD-SCNC: 144 MMOL/L (ref 136–145)
TOTAL PROTEIN: 7.1 G/DL (ref 6.4–8.2)
TRIGL SERPL-MCNC: 151 MG/DL (ref 0–150)
VLDLC SERPL CALC-MCNC: 30 MG/DL
WBC # BLD: 5.8 K/UL (ref 4–11)

## 2018-08-21 ENCOUNTER — OFFICE VISIT (OUTPATIENT)
Dept: INTERNAL MEDICINE | Age: 83
End: 2018-08-21

## 2018-08-21 VITALS
OXYGEN SATURATION: 96 % | DIASTOLIC BLOOD PRESSURE: 68 MMHG | WEIGHT: 171 LBS | BODY MASS INDEX: 29.35 KG/M2 | SYSTOLIC BLOOD PRESSURE: 114 MMHG | HEART RATE: 62 BPM

## 2018-08-21 DIAGNOSIS — G45.8 OTHER SPECIFIED TRANSIENT CEREBRAL ISCHEMIAS: ICD-10-CM

## 2018-08-21 DIAGNOSIS — I10 ESSENTIAL HYPERTENSION, BENIGN: ICD-10-CM

## 2018-08-21 DIAGNOSIS — I25.10 ATHEROSCLEROSIS OF CORONARY ARTERY OF NATIVE HEART WITHOUT ANGINA PECTORIS, UNSPECIFIED VESSEL OR LESION TYPE: ICD-10-CM

## 2018-08-21 DIAGNOSIS — E78.00 HYPERCHOLESTEREMIA: ICD-10-CM

## 2018-08-21 DIAGNOSIS — E11.9 TYPE 2 DIABETES MELLITUS WITHOUT COMPLICATION, WITHOUT LONG-TERM CURRENT USE OF INSULIN (HCC): Primary | ICD-10-CM

## 2018-08-21 PROCEDURE — G8510 SCR DEP NEG, NO PLAN REQD: HCPCS | Performed by: INTERNAL MEDICINE

## 2018-08-21 PROCEDURE — 99214 OFFICE O/P EST MOD 30 MIN: CPT | Performed by: INTERNAL MEDICINE

## 2018-08-21 ASSESSMENT — PATIENT HEALTH QUESTIONNAIRE - PHQ9
2. FEELING DOWN, DEPRESSED OR HOPELESS: 0
SUM OF ALL RESPONSES TO PHQ9 QUESTIONS 1 & 2: 0
SUM OF ALL RESPONSES TO PHQ QUESTIONS 1-9: 0
SUM OF ALL RESPONSES TO PHQ QUESTIONS 1-9: 0
1. LITTLE INTEREST OR PLEASURE IN DOING THINGS: 0

## 2018-08-21 ASSESSMENT — ENCOUNTER SYMPTOMS
CHEST TIGHTNESS: 0
SHORTNESS OF BREATH: 0
VOMITING: 0
NAUSEA: 0
ABDOMINAL PAIN: 0

## 2018-08-21 NOTE — PROGRESS NOTES
Outpatient Note for established Patient - regular Visit    History Obtained From:  patient, electronic medical record  Is the patient new to me ? - No    HISTORY OF PRESENT ILLNESS:   The patient is a 80 y.o. female who is here today for :  1) HTN  On Amlodipine and Metoprolol   Pt denies CP/SOB/palpitations/abdominal pain/N/V. So far well controlled, no side effect     2) Hx of CAD, TIA  On Aspirin, Pravastatin and BB  Pt denies CP/SOB/palpitations/abdominal pain/N/V. No numbness/ weakness/ tingling in extremities. No vision changes or headches. 3) HLD    4) she has not used albuterol for years     5) DM  She is on [place].    She gets occasionally dizziness with low glucose levels     Past Medical History:        Diagnosis Date    Allergic rhinitis, cause unspecified     Allergic urticaria     Angioneurotic edema not elsewhere classified     Aortic stenosis 12/9/2011    dx 11/2007 - echo - LVEF 55% , AV area 1.5 cm , peak velocity - 27 ECHO 6/2009 - LVEF 60%, AV area 1.3 cm , peak velocity 35  ECHO 12/1/2011 - LVEF -60-65%, AV area 0.99 cm , peak velocity 34  asymptomatic      Benign neoplasm of colon     Cancer ([facility] [address]     cervical 1964    Carcinoma in situ of cervix uteri     Coronary atherosclerosis of unspecified type of vessel, native or graft     Essential hypertension, benign     History of kidney stones     Internal hemorrhoids without mention of complication     MI, old 2007    Other and unspecified hyperlipidemia     Palpitations     Peptic ulcer, unspecified site, unspecified as acute or chronic, without mention of hemorrhage, perforation, or obstruction     Sebaceous cyst of breast 5/31/2012    Trigger finger 1/8/2013    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 She has never used smokeless tobacco.    ETOH:   reports that she drinks alcohol. Current Medications:    Prior to Admission medications    Medication Sig Start Date End Date Taking? Authorizing Provider   Liraglutide (VICTOZA) 18 MG/3ML SOPN SC injection Inject 0.6 mg into the skin daily DIAL AND INJECT SUBCUTANEOUSLY 0.6 MG DAILY 8/21/18  Yes Narciso Trimble MD   pravastatin (PRAVACHOL) 40 MG tablet Take 1 tablet by mouth daily 7/30/18  Yes Narciso Trimble MD   amLODIPine (NORVASC) 10 MG tablet Take 0.5 tablets by mouth daily 5/23/18  Yes Narciso Trimble MD   metoprolol tartrate (LOPRESSOR) 25 MG tablet TAKE ONE-HALF TABLET BY MOUTH DAILY 5/23/18  Yes Narciso Trimble MD   vitamin D (ERGOCALCIFEROL) 11725 units CAPS capsule Take 1 capsule by mouth once a week 4/23/18  Yes Narciso Trimble MD   ipratropium (ATROVENT) 0.06 % nasal spray 2 sprays by Nasal route 4 times daily as needed for Rhinitis 10/25/17  Yes Narciso Trimble MD   Biotin (BIOTIN 5000) 5 MG CAPS Take 1 capsule by mouth daily   Yes Historical Provider, MD Danny Hernandez. 5/20/15  Yes Pamela Viramontes MD   aspirin 81 MG tablet Take 81 mg by mouth daily. Yes Historical Provider, MD       REVIEW OF SYSTEMS:  Review of Systems   Constitutional: Negative for activity change, appetite change, chills, fever and unexpected weight change. HENT: Negative for hearing loss. Eyes: Negative for visual disturbance. Respiratory: Negative for chest tightness and shortness of breath. Cardiovascular: Negative for chest pain. Gastrointestinal: Negative for abdominal pain, nausea and vomiting. Genitourinary: Negative for hematuria. Skin: Negative for rash.    Allergic/Immunologic: Negative

## 2018-09-25 ENCOUNTER — APPOINTMENT (OUTPATIENT)
Dept: GENERAL RADIOLOGY | Age: 83
End: 2018-09-25
Payer: MEDICARE

## 2018-09-25 ENCOUNTER — HOSPITAL ENCOUNTER (EMERGENCY)
Age: 83
Discharge: HOME | End: 2018-09-26
Attending: EMERGENCY MEDICINE
Payer: MEDICARE

## 2018-09-25 VITALS
SYSTOLIC BLOOD PRESSURE: 140 MMHG | HEART RATE: 80 BPM | TEMPERATURE: 97.9 F | DIASTOLIC BLOOD PRESSURE: 55 MMHG | OXYGEN SATURATION: 92 % | RESPIRATION RATE: 15 BRPM

## 2018-09-25 DIAGNOSIS — K21.9 GASTROESOPHAGEAL REFLUX DISEASE, ESOPHAGITIS PRESENCE NOT SPECIFIED: Primary | ICD-10-CM

## 2018-09-25 LAB
ALBUMIN SERPL-MCNC: 4.2 G/DL (ref 3.4–5)
ALP BLD-CCNC: 79 U/L (ref 40–129)
ALT SERPL-CCNC: 35 U/L (ref 10–40)
ANION GAP SERPL CALCULATED.3IONS-SCNC: 12 MMOL/L (ref 3–16)
AST SERPL-CCNC: 55 U/L (ref 15–37)
BACTERIA: ABNORMAL /HPF
BASOPHILS ABSOLUTE: 0 K/UL (ref 0–0.2)
BASOPHILS RELATIVE PERCENT: 0.8 %
BILIRUB SERPL-MCNC: 1 MG/DL (ref 0–1)
BILIRUBIN DIRECT: <0.2 MG/DL (ref 0–0.3)
BILIRUBIN URINE: NEGATIVE MG/DL
BILIRUBIN, INDIRECT: ABNORMAL MG/DL (ref 0–1)
BLOOD, URINE: NEGATIVE
BUN BLDV-MCNC: 13 MG/DL (ref 7–20)
CALCIUM SERPL-MCNC: 9.1 MG/DL (ref 8.3–10.6)
CHLORIDE BLD-SCNC: 102 MMOL/L (ref 99–110)
CLARITY: ABNORMAL
CO2: 24 MMOL/L (ref 21–32)
COLOR: ABNORMAL
CREAT SERPL-MCNC: <0.5 MG/DL (ref 0.6–1.2)
EOSINOPHILS ABSOLUTE: 0.2 K/UL (ref 0–0.6)
EOSINOPHILS RELATIVE PERCENT: 3.5 %
EPITHELIAL CELLS, UA: ABNORMAL /HPF
GFR AFRICAN AMERICAN: >60
GFR NON-AFRICAN AMERICAN: >60
GLUCOSE BLD-MCNC: 247 MG/DL (ref 70–99)
GLUCOSE URINE: 100 MG/DL
HCT VFR BLD CALC: 44.1 % (ref 36–48)
HEMOGLOBIN: 15 G/DL (ref 12–16)
KETONES, URINE: NEGATIVE MG/DL
LEUKOCYTE ESTERASE, URINE: ABNORMAL
LIPASE: 40 U/L (ref 13–60)
LYMPHOCYTES ABSOLUTE: 1.1 K/UL (ref 1–5.1)
LYMPHOCYTES RELATIVE PERCENT: 18.5 %
MCH RBC QN AUTO: 31.5 PG (ref 26–34)
MCHC RBC AUTO-ENTMCNC: 34.1 G/DL (ref 31–36)
MCV RBC AUTO: 92.4 FL (ref 80–100)
MICROSCOPIC EXAMINATION: YES
MONOCYTES ABSOLUTE: 0.4 K/UL (ref 0–1.3)
MONOCYTES RELATIVE PERCENT: 7.4 %
NEUTROPHILS ABSOLUTE: 4.2 K/UL (ref 1.7–7.7)
NEUTROPHILS RELATIVE PERCENT: 69.8 %
NITRITE, URINE: NEGATIVE
PDW BLD-RTO: 14.4 % (ref 12.4–15.4)
PERFORMED ON: NORMAL
PERFORMED ON: NORMAL
PH UA: 6
PLATELET # BLD: 85 K/UL (ref 135–450)
PLATELET SLIDE REVIEW: ABNORMAL
PMV BLD AUTO: 9 FL (ref 5–10.5)
POC SAMPLE TYPE: NORMAL
POC SAMPLE TYPE: NORMAL
POC TROPONIN I: 0 NG/ML (ref 0–0.1)
POC TROPONIN I: 0.01 NG/ML (ref 0–0.1)
POTASSIUM SERPL-SCNC: 4.7 MMOL/L (ref 3.5–5.1)
PROTEIN UA: NEGATIVE MG/DL
RBC # BLD: 4.77 M/UL (ref 4–5.2)
RBC UA: ABNORMAL /HPF (ref 0–2)
SLIDE REVIEW: ABNORMAL
SODIUM BLD-SCNC: 138 MMOL/L (ref 136–145)
SPECIFIC GRAVITY UA: 1.02
TOTAL PROTEIN: 7.1 G/DL (ref 6.4–8.2)
UROBILINOGEN, URINE: 1 E.U./DL
WBC # BLD: 6.1 K/UL (ref 4–11)
WBC UA: ABNORMAL /HPF (ref 0–5)

## 2018-09-25 PROCEDURE — 85025 COMPLETE CBC W/AUTO DIFF WBC: CPT

## 2018-09-25 PROCEDURE — 71046 X-RAY EXAM CHEST 2 VIEWS: CPT

## 2018-09-25 PROCEDURE — 93005 ELECTROCARDIOGRAM TRACING: CPT | Performed by: PHYSICIAN ASSISTANT

## 2018-09-25 PROCEDURE — 80048 BASIC METABOLIC PNL TOTAL CA: CPT

## 2018-09-25 PROCEDURE — 80076 HEPATIC FUNCTION PANEL: CPT

## 2018-09-25 PROCEDURE — 83690 ASSAY OF LIPASE: CPT

## 2018-09-25 PROCEDURE — 84484 ASSAY OF TROPONIN QUANT: CPT

## 2018-09-25 PROCEDURE — 81001 URINALYSIS AUTO W/SCOPE: CPT

## 2018-09-25 PROCEDURE — 99284 EMERGENCY DEPT VISIT MOD MDM: CPT

## 2018-09-25 PROCEDURE — 6370000000 HC RX 637 (ALT 250 FOR IP): Performed by: EMERGENCY MEDICINE

## 2018-09-25 RX ADMIN — LIDOCAINE HYDROCHLORIDE: 20 SOLUTION ORAL; TOPICAL at 23:23

## 2018-09-26 NOTE — ED PROVIDER NOTES
back surgery (1977); Cervix removal (1964); Colonoscopy (02/23/2009); Cataract removal (5/2012); Colonoscopy (5/8/2013 ); Cystocopy ([id number]); Cardiac catheterization (6/6/2013 ); other surgical history (6/27/2013); Aortic valve replacement (7/12/2013 ); Colonoscopy (5/4/2015, 3/7/2016); and Upper gastrointestinal endoscopy (3/7/2016). Her family history includes Cancer in her sister; Heart Disease in her brother, brother, brother, and father. She reports that she has never smoked. She has never used smokeless tobacco. She reports that she drinks alcohol. She reports that she does not use drugs. Medications     Discharge Medication List as of 9/26/2018 12:10 AM      CONTINUE these medications which have NOT CHANGED    Details   Liraglutide (VICTOZA) 18 MG/3ML SOPN SC injection Inject 0.6 mg into the skin daily DIAL AND INJECT SUBCUTANEOUSLY 0.6 MG DAILY, Disp-18 pen, R-2Normal      pravastatin (PRAVACHOL) 40 MG tablet Take 1 tablet by mouth daily, Disp-30 tablet, R-5Normal      amLODIPine (NORVASC) 10 MG tablet Take 0.5 tablets by mouth daily, Disp-45 tablet, R-3Normal      metoprolol tartrate (LOPRESSOR) 25 MG tablet TAKE ONE-HALF TABLET BY MOUTH DAILY, Disp-45 tablet, R-3Normal      vitamin D (ERGOCALCIFEROL) 54364 units CAPS capsule Take 1 capsule by mouth once a week, Disp-4 capsule, R-5Normal      ipratropium (ATROVENT) 0.06 % nasal spray 2 sprays by Nasal route 4 times daily as needed for Rhinitis, Disp-1 Bottle, R-5Normal      Biotin (BIOTIN 5000) 5 MG CAPS Take 1 capsule by mouth daily      ACCU-CHEK BERNABE PLUS strip TEST BLOOD SUGAR ONE TIME A DAY OR AS DIRECTED BY PHYSICIAN., Disp-100 each, R-3Diagnosis 250.00      aspirin 81 MG tablet Take 81 mg by mouth daily. Allergies     She is allergic to actos [pioglitazone hydrochloride]; lisinopril; penicillins; lipitor; zocor [simvastatin]; and glucophage [metformin hydrochloride].     Physical Exam     INITIAL VITALS: BP: (!) 152/60, Temp: 97.9 °F (36.6 °C), Pulse: 82, Resp: 14, SpO2: 100 %   Physical Exam   Constitutional: She is oriented to person, place, and time. She appears well-developed and well-nourished. No distress. HENT:   Head: Normocephalic and atraumatic. Eyes: Pupils are equal, round, and reactive to light. Conjunctivae are normal.   Neck: Normal range of motion. Neck supple. Cardiovascular: Normal rate, regular rhythm and intact distal pulses. Murmur (aortic murmur noted) heard. Pulmonary/Chest: Effort normal. No respiratory distress. Abdominal: Soft. She exhibits no distension. There is tenderness (mild epigastric). There is no rebound and no guarding. Musculoskeletal: Normal range of motion. She exhibits no edema, tenderness or deformity. Neurological: She is alert and oriented to person, place, and time. No cranial nerve deficit. She exhibits normal muscle tone. Coordination normal.   Ambulates on out without difficulty. Normal gait. Skin: Skin is warm and dry. No rash noted. She is not diaphoretic. No erythema. No pallor. Diagnostic Results     EKG   Interpreted in conjunction with emergency department physician Michael Sol MD  Rhythm: normal sinus   Rate: 77  Axis: left  Ectopy: none  Conduction: right bundle branch block (complete)  ST Segments: normal  T Waves: normal  Q Waves: none  Clinical Impression: NSR with left ventricular hypertrophy with repolarization abnormality  Comparison:  Consistent with EKG from January 2018    RADIOLOGY:  XR CHEST STANDARD (2 VW)   Final Result      1. No acute disease.                 LABS:   Results for orders placed or performed during the hospital encounter of 09/25/18   CBC auto differential   Result Value Ref Range    WBC 6.1 4.0 - 11.0 K/uL    RBC 4.77 4.00 - 5.20 M/uL    Hemoglobin 15.0 12.0 - 16.0 g/dL    Hematocrit 44.1 36.0 - 48.0 %    MCV 92.4 80.0 - 100.0 fL    MCH 31.5 26.0 - 34.0 pg    MCHC 34.1 31.0 - 36.0 g/dL    RDW 14.4 12.4 - 15.4 %    Platelets 85 (L) Esterase, Urine SMALL (A) Negative    Microscopic Examination Yes    POCT Venous   Result Value Ref Range    POC Troponin I 0.00 0.00 - 0.10 ng/mL    Sample Type KIMBERLY     Performed on SEE BELOW        RECENT VITALS:  BP: (!) 140/55, Temp: 97.9 °F (36.6 °C), Pulse: 80, Resp: 15, SpO2: 92 %     Procedures       ED Course     Nursing Notes, Past Medical Hx, Past Surgical Hx, Social Hx, Allergies, and Family Hx were reviewed. The patient was given the following medications:  Orders Placed This Encounter   Medications    aluminum & magnesium hydroxide-simethicone (MAALOX) 30 mL, lidocaine viscous (XYLOCAINE) 5 mL (GI COCKTAIL)       CONSULTS:  None    MEDICAL DECISION MAKING / ASSESSMENT / Rise Shannan is a 80 y.o. female presenting with epigastric tenderness and nausea. Patient 0 and 90 minute troponins were both negative. EKG was consistent with prior EKG from earlier this year. Patient's UA showed no signs of obvious UTI. Patient's CBC and BMP results are within normal limits. Lipase and LFTs also resulted within normal limits, making pancreatitis unlikely. Patient was given a GI cocktail and stated that her symptoms resolved following this. At this time, with her baseline EKG, normal labs, and a reassuring benign abdominal exam, we feel patient's symptoms are likely related to her underlying GERD. Instructed patient to increase her Protonix dose to b.i.d. for the next couple of days and to follow up with her PCP. Discussed importance of drinking water, sitting up after eating, and avoiding spicy foods. Patient verbalized understanding, was agreeable to this plan, and was eager to be discharged home. This patient was also evaluated by the attending physician. All care plans were discussed and agreed upon. Clinical Impression     1. Gastroesophageal reflux disease, esophagitis presence not specified        Disposition     PATIENT REFERRED TO:  Jonn Jacinto MD  [address] [place] [address]

## 2018-10-04 LAB
EKG ATRIAL RATE: 77 BPM
EKG DIAGNOSIS: NORMAL
EKG P AXIS: 46 DEGREES
EKG P-R INTERVAL: 182 MS
EKG Q-T INTERVAL: 432 MS
EKG QRS DURATION: 130 MS
EKG QTC CALCULATION (BAZETT): 488 MS
EKG R AXIS: -43 DEGREES
EKG T AXIS: 75 DEGREES
EKG VENTRICULAR RATE: 77 BPM

## 2018-11-06 ENCOUNTER — TELEPHONE (OUTPATIENT)
Dept: INTERNAL MEDICINE CLINIC | Age: 83
End: 2018-11-06

## 2018-11-06 DIAGNOSIS — K27.9 PEPTIC ULCER: ICD-10-CM

## 2018-11-06 RX ORDER — PANTOPRAZOLE SODIUM 40 MG/1
40 TABLET, DELAYED RELEASE ORAL DAILY
Qty: 30 TABLET | Refills: 5 | Status: ON HOLD | OUTPATIENT
Start: 2018-11-06 | End: 2019-01-28

## 2018-11-27 ENCOUNTER — OFFICE VISIT (OUTPATIENT)
Dept: INTERNAL MEDICINE CLINIC | Age: 83
End: 2018-11-27
Payer: MEDICARE

## 2018-11-27 VITALS
OXYGEN SATURATION: 97 % | HEART RATE: 64 BPM | BODY MASS INDEX: 29.87 KG/M2 | DIASTOLIC BLOOD PRESSURE: 72 MMHG | SYSTOLIC BLOOD PRESSURE: 128 MMHG | WEIGHT: 174 LBS

## 2018-11-27 DIAGNOSIS — R10.13 EPIGASTRIC PAIN: ICD-10-CM

## 2018-11-27 DIAGNOSIS — I10 ESSENTIAL HYPERTENSION, BENIGN: ICD-10-CM

## 2018-11-27 DIAGNOSIS — J45.20 MILD INTERMITTENT ASTHMA WITHOUT COMPLICATION: ICD-10-CM

## 2018-11-27 DIAGNOSIS — R10.13 EPIGASTRIC PAIN: Primary | ICD-10-CM

## 2018-11-27 DIAGNOSIS — E11.9 TYPE 2 DIABETES MELLITUS WITHOUT COMPLICATION, WITHOUT LONG-TERM CURRENT USE OF INSULIN (HCC): ICD-10-CM

## 2018-11-27 DIAGNOSIS — E78.00 HYPERCHOLESTEREMIA: ICD-10-CM

## 2018-11-27 DIAGNOSIS — G45.8 OTHER SPECIFIED TRANSIENT CEREBRAL ISCHEMIAS: ICD-10-CM

## 2018-11-27 DIAGNOSIS — I25.10 ATHEROSCLEROSIS OF CORONARY ARTERY OF NATIVE HEART WITHOUT ANGINA PECTORIS, UNSPECIFIED VESSEL OR LESION TYPE: ICD-10-CM

## 2018-11-27 DIAGNOSIS — Z23 NEED FOR INFLUENZA VACCINATION: ICD-10-CM

## 2018-11-27 LAB
A/G RATIO: 1.6 (ref 1.1–2.2)
ALBUMIN SERPL-MCNC: 4.4 G/DL (ref 3.4–5)
ALP BLD-CCNC: 83 U/L (ref 40–129)
ALT SERPL-CCNC: 34 U/L (ref 10–40)
ANION GAP SERPL CALCULATED.3IONS-SCNC: 15 MMOL/L (ref 3–16)
AST SERPL-CCNC: 45 U/L (ref 15–37)
BASOPHILS ABSOLUTE: 0.1 K/UL (ref 0–0.2)
BASOPHILS RELATIVE PERCENT: 0.8 %
BILIRUB SERPL-MCNC: 1 MG/DL (ref 0–1)
BUN BLDV-MCNC: 10 MG/DL (ref 7–20)
CALCIUM SERPL-MCNC: 9.9 MG/DL (ref 8.3–10.6)
CHLORIDE BLD-SCNC: 105 MMOL/L (ref 99–110)
CHOLESTEROL, TOTAL: 160 MG/DL (ref 0–199)
CO2: 26 MMOL/L (ref 21–32)
CREAT SERPL-MCNC: 0.6 MG/DL (ref 0.6–1.2)
EOSINOPHILS ABSOLUTE: 0.2 K/UL (ref 0–0.6)
EOSINOPHILS RELATIVE PERCENT: 3.8 %
GFR AFRICAN AMERICAN: >60
GFR NON-AFRICAN AMERICAN: >60
GLOBULIN: 2.8 G/DL
GLUCOSE BLD-MCNC: 146 MG/DL (ref 70–99)
HCT VFR BLD CALC: 47 % (ref 36–48)
HDLC SERPL-MCNC: 40 MG/DL (ref 40–60)
HEMOGLOBIN: 16 G/DL (ref 12–16)
LDL CHOLESTEROL CALCULATED: 72 MG/DL
LIPASE: 41 U/L (ref 13–60)
LYMPHOCYTES ABSOLUTE: 1.5 K/UL (ref 1–5.1)
LYMPHOCYTES RELATIVE PERCENT: 23.8 %
MCH RBC QN AUTO: 31.7 PG (ref 26–34)
MCHC RBC AUTO-ENTMCNC: 34.1 G/DL (ref 31–36)
MCV RBC AUTO: 93 FL (ref 80–100)
MONOCYTES ABSOLUTE: 0.5 K/UL (ref 0–1.3)
MONOCYTES RELATIVE PERCENT: 7.9 %
NEUTROPHILS ABSOLUTE: 4 K/UL (ref 1.7–7.7)
NEUTROPHILS RELATIVE PERCENT: 63.7 %
PDW BLD-RTO: 14.1 % (ref 12.4–15.4)
PLATELET # BLD: 114 K/UL (ref 135–450)
PMV BLD AUTO: 9.6 FL (ref 5–10.5)
POTASSIUM SERPL-SCNC: 4.3 MMOL/L (ref 3.5–5.1)
RBC # BLD: 5.05 M/UL (ref 4–5.2)
SODIUM BLD-SCNC: 146 MMOL/L (ref 136–145)
TOTAL PROTEIN: 7.2 G/DL (ref 6.4–8.2)
TRIGL SERPL-MCNC: 242 MG/DL (ref 0–150)
VLDLC SERPL CALC-MCNC: 48 MG/DL
WBC # BLD: 6.2 K/UL (ref 4–11)

## 2018-11-27 PROCEDURE — 99214 OFFICE O/P EST MOD 30 MIN: CPT | Performed by: INTERNAL MEDICINE

## 2018-11-27 PROCEDURE — G0008 ADMIN INFLUENZA VIRUS VAC: HCPCS | Performed by: INTERNAL MEDICINE

## 2018-11-27 PROCEDURE — 90662 IIV NO PRSV INCREASED AG IM: CPT | Performed by: INTERNAL MEDICINE

## 2018-11-27 PROCEDURE — 3288F FALL RISK ASSESSMENT DOCD: CPT | Performed by: INTERNAL MEDICINE

## 2018-11-27 RX ORDER — CETIRIZINE HYDROCHLORIDE 10 MG/1
10 TABLET ORAL DAILY PRN
COMMUNITY

## 2018-11-27 ASSESSMENT — ENCOUNTER SYMPTOMS
CHEST TIGHTNESS: 0
SHORTNESS OF BREATH: 0
ABDOMINAL PAIN: 0
NAUSEA: 0
VOMITING: 0

## 2018-11-27 NOTE — PROGRESS NOTES
Assessment/Plan:   Lyssa Lazo was seen today for diabetes and gastroesophageal reflux. Diagnoses and all orders for this visit:    Epigastric pain  Epigastric pain with occasional scapular referred pain   I will send to GI to consider repeat scopig and US   Most likely gastritis but due to gae and mild AST elevation- no to r/o gallbladder and other etiologies. -     Marylee Lied, MD, Gastroenterology, [facility]  -     CBC Auto Differential; Future  -     Comprehensive Metabolic Panel; Future  -     US ABDOMEN LIMITED; Future  -     LIPASE; Future    Hypertension  Well controlled- no changes in medication today. -     CBC Auto Differential; Future  -     Comprehensive Metabolic Panel; Future  -     Lipid Panel; Future    Atherosclerosis of coronary artery of native heart without angina pectoris, unspecified vessel or lesion type  Well controlled- no changes in medication today. -     CBC Auto Differential; Future  -     Comprehensive Metabolic Panel; Future  -     Lipid Panel; Future    Hypercholesteremia  Well controlled- no changes in medication today. -     Lipid Panel; Future    Type 2 diabetes mellitus without complication, without long-term current use of insulin (HCC)  Well controlled- no changes in medication today. -     CBC Auto Differential; Future  -     Comprehensive Metabolic Panel; Future  -     Hemoglobin A1C; Future  -     Lipid Panel; Future    Other specified transient cerebral ischemias    Mild intermittent asthma without complication  No chnages, no SOB / wheezing     - Patient was encouraged to callthe office (and ask to see me) or be seen by other provider for any worsening or lack    of improvement in his symptoms.       - Pt was asked toschedule an appointment in 3 months, and to let me know of any scheduling difficulties. Additional patients instructions (if given): There are no Patient Instructions on file for this visit.     Ban Contreras M.D.

## 2018-11-28 LAB
ESTIMATED AVERAGE GLUCOSE: 148.5 MG/DL
HBA1C MFR BLD: 6.8 %

## 2018-12-03 ENCOUNTER — HOSPITAL ENCOUNTER (OUTPATIENT)
Dept: ULTRASOUND IMAGING | Age: 83
Discharge: HOME | End: 2018-12-03
Payer: MEDICARE

## 2018-12-03 DIAGNOSIS — R10.13 EPIGASTRIC PAIN: ICD-10-CM

## 2018-12-03 PROCEDURE — 76705 ECHO EXAM OF ABDOMEN: CPT

## 2019-01-05 ENCOUNTER — HOSPITAL ENCOUNTER (INPATIENT)
Age: 84
LOS: 2 days | Discharge: HOME | DRG: 312 | End: 2019-01-07
Attending: EMERGENCY MEDICINE | Admitting: INTERNAL MEDICINE
Payer: MEDICARE

## 2019-01-05 ENCOUNTER — APPOINTMENT (OUTPATIENT)
Dept: GENERAL RADIOLOGY | Age: 84
DRG: 312 | End: 2019-01-05
Payer: MEDICARE

## 2019-01-05 ENCOUNTER — APPOINTMENT (OUTPATIENT)
Dept: CT IMAGING | Age: 84
DRG: 312 | End: 2019-01-05
Payer: MEDICARE

## 2019-01-05 DIAGNOSIS — R55 NEAR SYNCOPE: Primary | ICD-10-CM

## 2019-01-05 PROBLEM — I45.2 BIFASCICULAR BLOCK: Status: ACTIVE | Noted: 2019-01-05

## 2019-01-05 PROBLEM — D69.6 THROMBOCYTOPENIA (HCC): Chronic | Status: ACTIVE | Noted: 2019-01-05

## 2019-01-05 LAB
ALBUMIN SERPL-MCNC: 4.3 G/DL (ref 3.4–5)
ALP BLD-CCNC: 73 U/L (ref 40–129)
ALT SERPL-CCNC: 28 U/L (ref 10–40)
ANION GAP SERPL CALCULATED.3IONS-SCNC: 13 MMOL/L (ref 3–16)
AST SERPL-CCNC: 42 U/L (ref 15–37)
BACTERIA: ABNORMAL /HPF
BASOPHILS ABSOLUTE: 0 K/UL (ref 0–0.2)
BASOPHILS RELATIVE PERCENT: 0.5 %
BILIRUB SERPL-MCNC: 1.1 MG/DL (ref 0–1)
BILIRUBIN DIRECT: <0.2 MG/DL (ref 0–0.3)
BILIRUBIN URINE: NEGATIVE
BILIRUBIN, INDIRECT: ABNORMAL MG/DL (ref 0–1)
BLOOD, URINE: NEGATIVE
BUN BLDV-MCNC: 9 MG/DL (ref 7–20)
CALCIUM SERPL-MCNC: 9.6 MG/DL (ref 8.3–10.6)
CHLORIDE BLD-SCNC: 102 MMOL/L (ref 99–110)
CLARITY: CLEAR
CO2: 24 MMOL/L (ref 21–32)
COLOR: YELLOW
CREAT SERPL-MCNC: <0.5 MG/DL (ref 0.6–1.2)
D DIMER: <200 NG/ML DDU (ref 0–229)
EOSINOPHILS ABSOLUTE: 0.2 K/UL (ref 0–0.6)
EOSINOPHILS RELATIVE PERCENT: 4.4 %
EPITHELIAL CELLS, UA: ABNORMAL /HPF
GFR AFRICAN AMERICAN: >60
GFR NON-AFRICAN AMERICAN: >60
GLUCOSE BLD-MCNC: 151 MG/DL (ref 70–99)
GLUCOSE BLD-MCNC: 219 MG/DL (ref 70–99)
GLUCOSE BLD-MCNC: 97 MG/DL (ref 70–99)
GLUCOSE URINE: NEGATIVE MG/DL
HCT VFR BLD CALC: 46.3 % (ref 36–48)
HEMOGLOBIN: 15.7 G/DL (ref 12–16)
INR BLD: 1.14 (ref 0.86–1.14)
KETONES, URINE: NEGATIVE MG/DL
LEUKOCYTE ESTERASE, URINE: ABNORMAL
LYMPHOCYTES ABSOLUTE: 1.1 K/UL (ref 1–5.1)
LYMPHOCYTES RELATIVE PERCENT: 22.6 %
MAGNESIUM: 2.1 MG/DL (ref 1.8–2.4)
MCH RBC QN AUTO: 31.3 PG (ref 26–34)
MCHC RBC AUTO-ENTMCNC: 34 G/DL (ref 31–36)
MCV RBC AUTO: 92 FL (ref 80–100)
MICROSCOPIC EXAMINATION: YES
MONOCYTES ABSOLUTE: 0.4 K/UL (ref 0–1.3)
MONOCYTES RELATIVE PERCENT: 7.5 %
NEUTROPHILS ABSOLUTE: 3.1 K/UL (ref 1.7–7.7)
NEUTROPHILS RELATIVE PERCENT: 65 %
NITRITE, URINE: NEGATIVE
PDW BLD-RTO: 14.1 % (ref 12.4–15.4)
PERFORMED ON: ABNORMAL
PERFORMED ON: NORMAL
PH UA: 7.5
PLATELET # BLD: 96 K/UL (ref 135–450)
PLATELET SLIDE REVIEW: ABNORMAL
PMV BLD AUTO: 9.6 FL (ref 5–10.5)
POTASSIUM SERPL-SCNC: 4.2 MMOL/L (ref 3.5–5.1)
PROTEIN UA: NEGATIVE MG/DL
PROTHROMBIN TIME: 13 SEC (ref 9.8–13)
RBC # BLD: 5.03 M/UL (ref 4–5.2)
RBC UA: ABNORMAL /HPF (ref 0–2)
SODIUM BLD-SCNC: 139 MMOL/L (ref 136–145)
SPECIFIC GRAVITY UA: 1.01
TOTAL PROTEIN: 7.8 G/DL (ref 6.4–8.2)
TROPONIN: <0.01 NG/ML
TROPONIN: <0.01 NG/ML
URINE TYPE: ABNORMAL
UROBILINOGEN, URINE: 0.2 E.U./DL
WBC # BLD: 4.8 K/UL (ref 4–11)
WBC UA: ABNORMAL /HPF (ref 0–5)

## 2019-01-05 PROCEDURE — 96372 THER/PROPH/DIAG INJ SC/IM: CPT

## 2019-01-05 PROCEDURE — 93005 ELECTROCARDIOGRAM TRACING: CPT | Performed by: EMERGENCY MEDICINE

## 2019-01-05 PROCEDURE — 6370000000 HC RX 637 (ALT 250 FOR IP): Performed by: STUDENT IN AN ORGANIZED HEALTH CARE EDUCATION/TRAINING PROGRAM

## 2019-01-05 PROCEDURE — 81001 URINALYSIS AUTO W/SCOPE: CPT

## 2019-01-05 PROCEDURE — 36415 COLL VENOUS BLD VENIPUNCTURE: CPT

## 2019-01-05 PROCEDURE — 99285 EMERGENCY DEPT VISIT HI MDM: CPT

## 2019-01-05 PROCEDURE — 2580000003 HC RX 258: Performed by: STUDENT IN AN ORGANIZED HEALTH CARE EDUCATION/TRAINING PROGRAM

## 2019-01-05 PROCEDURE — 80048 BASIC METABOLIC PNL TOTAL CA: CPT

## 2019-01-05 PROCEDURE — 85025 COMPLETE CBC W/AUTO DIFF WBC: CPT

## 2019-01-05 PROCEDURE — 85610 PROTHROMBIN TIME: CPT

## 2019-01-05 PROCEDURE — 85379 FIBRIN DEGRADATION QUANT: CPT

## 2019-01-05 PROCEDURE — 6360000002 HC RX W HCPCS: Performed by: STUDENT IN AN ORGANIZED HEALTH CARE EDUCATION/TRAINING PROGRAM

## 2019-01-05 PROCEDURE — 80076 HEPATIC FUNCTION PANEL: CPT

## 2019-01-05 PROCEDURE — 70450 CT HEAD/BRAIN W/O DYE: CPT

## 2019-01-05 PROCEDURE — 84443 ASSAY THYROID STIM HORMONE: CPT

## 2019-01-05 PROCEDURE — 84484 ASSAY OF TROPONIN QUANT: CPT

## 2019-01-05 PROCEDURE — 83036 HEMOGLOBIN GLYCOSYLATED A1C: CPT

## 2019-01-05 PROCEDURE — 71046 X-RAY EXAM CHEST 2 VIEWS: CPT

## 2019-01-05 PROCEDURE — 1200000000 HC SEMI PRIVATE

## 2019-01-05 PROCEDURE — 83735 ASSAY OF MAGNESIUM: CPT

## 2019-01-05 RX ORDER — PANTOPRAZOLE SODIUM 40 MG/1
40 TABLET, DELAYED RELEASE ORAL DAILY
Status: DISCONTINUED | OUTPATIENT
Start: 2019-01-05 | End: 2019-01-07 | Stop reason: HOSPADM

## 2019-01-05 RX ORDER — PRAVASTATIN SODIUM 40 MG
40 TABLET ORAL DAILY
Status: DISCONTINUED | OUTPATIENT
Start: 2019-01-05 | End: 2019-01-07 | Stop reason: HOSPADM

## 2019-01-05 RX ORDER — RANITIDINE 150 MG/1
150 TABLET ORAL 2 TIMES DAILY
Status: DISCONTINUED | OUTPATIENT
Start: 2019-01-05 | End: 2019-01-05

## 2019-01-05 RX ORDER — DEXTROSE MONOHYDRATE 25 G/50ML
12.5 INJECTION, SOLUTION INTRAVENOUS PRN
Status: DISCONTINUED | OUTPATIENT
Start: 2019-01-05 | End: 2019-01-07 | Stop reason: HOSPADM

## 2019-01-05 RX ORDER — ASPIRIN 81 MG/1
81 TABLET, CHEWABLE ORAL DAILY
Status: DISCONTINUED | OUTPATIENT
Start: 2019-01-05 | End: 2019-01-07 | Stop reason: HOSPADM

## 2019-01-05 RX ORDER — AMLODIPINE BESYLATE 10 MG/1
10 TABLET ORAL DAILY
Status: DISCONTINUED | OUTPATIENT
Start: 2019-01-06 | End: 2019-01-06

## 2019-01-05 RX ORDER — SODIUM CHLORIDE 9 MG/ML
INJECTION, SOLUTION INTRAVENOUS CONTINUOUS
Status: DISCONTINUED | OUTPATIENT
Start: 2019-01-05 | End: 2019-01-07 | Stop reason: HOSPADM

## 2019-01-05 RX ORDER — FAMOTIDINE 20 MG/1
20 TABLET, FILM COATED ORAL 2 TIMES DAILY
Status: DISCONTINUED | OUTPATIENT
Start: 2019-01-05 | End: 2019-01-05

## 2019-01-05 RX ORDER — DEXTROSE MONOHYDRATE 50 MG/ML
100 INJECTION, SOLUTION INTRAVENOUS PRN
Status: DISCONTINUED | OUTPATIENT
Start: 2019-01-05 | End: 2019-01-07 | Stop reason: HOSPADM

## 2019-01-05 RX ORDER — NICOTINE POLACRILEX 4 MG
15 LOZENGE BUCCAL PRN
Status: DISCONTINUED | OUTPATIENT
Start: 2019-01-05 | End: 2019-01-07 | Stop reason: HOSPADM

## 2019-01-05 RX ORDER — CETIRIZINE HYDROCHLORIDE 10 MG/1
10 TABLET ORAL DAILY
Status: DISCONTINUED | OUTPATIENT
Start: 2019-01-05 | End: 2019-01-07 | Stop reason: HOSPADM

## 2019-01-05 RX ORDER — ACETAMINOPHEN 325 MG/1
650 TABLET ORAL EVERY 4 HOURS PRN
Status: DISCONTINUED | OUTPATIENT
Start: 2019-01-05 | End: 2019-01-07 | Stop reason: HOSPADM

## 2019-01-05 RX ORDER — AMLODIPINE BESYLATE 10 MG/1
10 TABLET ORAL DAILY
Status: ON HOLD | COMMUNITY
End: 2019-01-07

## 2019-01-05 RX ORDER — SODIUM CHLORIDE 0.9 % (FLUSH) 0.9 %
10 SYRINGE (ML) INJECTION EVERY 12 HOURS SCHEDULED
Status: DISCONTINUED | OUTPATIENT
Start: 2019-01-05 | End: 2019-01-07 | Stop reason: HOSPADM

## 2019-01-05 RX ORDER — SODIUM CHLORIDE 0.9 % (FLUSH) 0.9 %
10 SYRINGE (ML) INJECTION PRN
Status: DISCONTINUED | OUTPATIENT
Start: 2019-01-05 | End: 2019-01-07 | Stop reason: HOSPADM

## 2019-01-05 RX ORDER — ONDANSETRON 2 MG/ML
4 INJECTION INTRAMUSCULAR; INTRAVENOUS EVERY 6 HOURS PRN
Status: DISCONTINUED | OUTPATIENT
Start: 2019-01-05 | End: 2019-01-07 | Stop reason: HOSPADM

## 2019-01-05 RX ADMIN — ENOXAPARIN SODIUM 40 MG: 40 INJECTION SUBCUTANEOUS at 16:58

## 2019-01-05 RX ADMIN — SODIUM CHLORIDE: 900 INJECTION, SOLUTION INTRAVENOUS at 15:30

## 2019-01-05 RX ADMIN — PANTOPRAZOLE SODIUM 40 MG: 40 TABLET, DELAYED RELEASE ORAL at 16:58

## 2019-01-05 RX ADMIN — ASPIRIN 81 MG 81 MG: 81 TABLET ORAL at 21:43

## 2019-01-05 ASSESSMENT — ENCOUNTER SYMPTOMS
SORE THROAT: 0
BLOOD IN STOOL: 0
VOICE CHANGE: 0
SHORTNESS OF BREATH: 0
CONSTIPATION: 0
NAUSEA: 0
ABDOMINAL DISTENTION: 0
DIARRHEA: 0
ABDOMINAL PAIN: 0
BACK PAIN: 0
COLOR CHANGE: 0
CHEST TIGHTNESS: 0
VOMITING: 0
TROUBLE SWALLOWING: 0

## 2019-01-05 ASSESSMENT — PAIN SCALES - GENERAL
PAINLEVEL_OUTOF10: 0

## 2019-01-06 PROBLEM — R42 DIZZINESS: Status: ACTIVE | Noted: 2019-01-06

## 2019-01-06 LAB
ANION GAP SERPL CALCULATED.3IONS-SCNC: 10 MMOL/L (ref 3–16)
BASOPHILS ABSOLUTE: 0 K/UL (ref 0–0.2)
BASOPHILS RELATIVE PERCENT: 0.9 %
BUN BLDV-MCNC: 8 MG/DL (ref 7–20)
CALCIUM SERPL-MCNC: 8.9 MG/DL (ref 8.3–10.6)
CHLORIDE BLD-SCNC: 106 MMOL/L (ref 99–110)
CO2: 25 MMOL/L (ref 21–32)
CREAT SERPL-MCNC: <0.5 MG/DL (ref 0.6–1.2)
EKG ATRIAL RATE: 64 BPM
EKG DIAGNOSIS: NORMAL
EKG P AXIS: 43 DEGREES
EKG P-R INTERVAL: 186 MS
EKG Q-T INTERVAL: 478 MS
EKG QRS DURATION: 136 MS
EKG QTC CALCULATION (BAZETT): 493 MS
EKG R AXIS: -48 DEGREES
EKG T AXIS: 44 DEGREES
EKG VENTRICULAR RATE: 64 BPM
EOSINOPHILS ABSOLUTE: 0.2 K/UL (ref 0–0.6)
EOSINOPHILS RELATIVE PERCENT: 4.4 %
ESTIMATED AVERAGE GLUCOSE: 125.5 MG/DL
GFR AFRICAN AMERICAN: >60
GFR NON-AFRICAN AMERICAN: >60
GLUCOSE BLD-MCNC: 126 MG/DL (ref 70–99)
GLUCOSE BLD-MCNC: 129 MG/DL (ref 70–99)
GLUCOSE BLD-MCNC: 141 MG/DL (ref 70–99)
GLUCOSE BLD-MCNC: 94 MG/DL (ref 70–99)
GLUCOSE BLD-MCNC: 96 MG/DL (ref 70–99)
HBA1C MFR BLD: 6 %
HCT VFR BLD CALC: 43.8 % (ref 36–48)
HEMOGLOBIN: 14.7 G/DL (ref 12–16)
LYMPHOCYTES ABSOLUTE: 1.4 K/UL (ref 1–5.1)
LYMPHOCYTES RELATIVE PERCENT: 28.5 %
MCH RBC QN AUTO: 31.6 PG (ref 26–34)
MCHC RBC AUTO-ENTMCNC: 33.6 G/DL (ref 31–36)
MCV RBC AUTO: 93.9 FL (ref 80–100)
MONOCYTES ABSOLUTE: 0.4 K/UL (ref 0–1.3)
MONOCYTES RELATIVE PERCENT: 8.1 %
NEUTROPHILS ABSOLUTE: 2.8 K/UL (ref 1.7–7.7)
NEUTROPHILS RELATIVE PERCENT: 58.1 %
PDW BLD-RTO: 13.8 % (ref 12.4–15.4)
PERFORMED ON: ABNORMAL
PERFORMED ON: NORMAL
PLATELET # BLD: 86 K/UL (ref 135–450)
PMV BLD AUTO: 9 FL (ref 5–10.5)
POTASSIUM REFLEX MAGNESIUM: 3.9 MMOL/L (ref 3.5–5.1)
RBC # BLD: 4.66 M/UL (ref 4–5.2)
SODIUM BLD-SCNC: 141 MMOL/L (ref 136–145)
TROPONIN: <0.01 NG/ML
TSH REFLEX: 2.64 UIU/ML (ref 0.27–4.2)
WBC # BLD: 4.8 K/UL (ref 4–11)

## 2019-01-06 PROCEDURE — 85025 COMPLETE CBC W/AUTO DIFF WBC: CPT

## 2019-01-06 PROCEDURE — 6360000002 HC RX W HCPCS: Performed by: STUDENT IN AN ORGANIZED HEALTH CARE EDUCATION/TRAINING PROGRAM

## 2019-01-06 PROCEDURE — 84484 ASSAY OF TROPONIN QUANT: CPT

## 2019-01-06 PROCEDURE — 6370000000 HC RX 637 (ALT 250 FOR IP): Performed by: STUDENT IN AN ORGANIZED HEALTH CARE EDUCATION/TRAINING PROGRAM

## 2019-01-06 PROCEDURE — 96372 THER/PROPH/DIAG INJ SC/IM: CPT

## 2019-01-06 PROCEDURE — 80048 BASIC METABOLIC PNL TOTAL CA: CPT

## 2019-01-06 PROCEDURE — 1200000000 HC SEMI PRIVATE

## 2019-01-06 PROCEDURE — 99222 1ST HOSP IP/OBS MODERATE 55: CPT | Performed by: INTERNAL MEDICINE

## 2019-01-06 PROCEDURE — 2580000003 HC RX 258: Performed by: STUDENT IN AN ORGANIZED HEALTH CARE EDUCATION/TRAINING PROGRAM

## 2019-01-06 PROCEDURE — 36415 COLL VENOUS BLD VENIPUNCTURE: CPT

## 2019-01-06 RX ADMIN — AMLODIPINE BESYLATE 10 MG: 10 TABLET ORAL at 08:10

## 2019-01-06 RX ADMIN — PANTOPRAZOLE SODIUM 40 MG: 40 TABLET, DELAYED RELEASE ORAL at 08:10

## 2019-01-06 RX ADMIN — ENOXAPARIN SODIUM 40 MG: 40 INJECTION SUBCUTANEOUS at 08:10

## 2019-01-06 RX ADMIN — SODIUM CHLORIDE: 900 INJECTION, SOLUTION INTRAVENOUS at 03:15

## 2019-01-06 RX ADMIN — CETIRIZINE HYDROCHLORIDE 10 MG: 10 TABLET, FILM COATED ORAL at 08:10

## 2019-01-06 RX ADMIN — PRAVASTATIN SODIUM 40 MG: 40 TABLET ORAL at 08:10

## 2019-01-06 RX ADMIN — ASPIRIN 81 MG 81 MG: 81 TABLET ORAL at 08:10

## 2019-01-06 ASSESSMENT — PAIN SCALES - GENERAL
PAINLEVEL_OUTOF10: 0

## 2019-01-07 ENCOUNTER — APPOINTMENT (OUTPATIENT)
Dept: MRI IMAGING | Age: 84
DRG: 312 | End: 2019-01-07
Payer: MEDICARE

## 2019-01-07 VITALS
BODY MASS INDEX: 30.37 KG/M2 | RESPIRATION RATE: 16 BRPM | SYSTOLIC BLOOD PRESSURE: 128 MMHG | WEIGHT: 171.4 LBS | HEART RATE: 69 BPM | OXYGEN SATURATION: 94 % | DIASTOLIC BLOOD PRESSURE: 72 MMHG | TEMPERATURE: 97.8 F | HEIGHT: 63 IN

## 2019-01-07 LAB
ANION GAP SERPL CALCULATED.3IONS-SCNC: 10 MMOL/L (ref 3–16)
BASOPHILS ABSOLUTE: 0 K/UL (ref 0–0.2)
BASOPHILS RELATIVE PERCENT: 0.7 %
BUN BLDV-MCNC: 11 MG/DL (ref 7–20)
CALCIUM SERPL-MCNC: 8.7 MG/DL (ref 8.3–10.6)
CHLORIDE BLD-SCNC: 108 MMOL/L (ref 99–110)
CO2: 22 MMOL/L (ref 21–32)
CREAT SERPL-MCNC: <0.5 MG/DL (ref 0.6–1.2)
EOSINOPHILS ABSOLUTE: 0.2 K/UL (ref 0–0.6)
EOSINOPHILS RELATIVE PERCENT: 4.3 %
GFR AFRICAN AMERICAN: >60
GFR NON-AFRICAN AMERICAN: >60
GLUCOSE BLD-MCNC: 106 MG/DL (ref 70–99)
GLUCOSE BLD-MCNC: 118 MG/DL (ref 70–99)
GLUCOSE BLD-MCNC: 128 MG/DL (ref 70–99)
GLUCOSE BLD-MCNC: 142 MG/DL (ref 70–99)
HCT VFR BLD CALC: 41.7 % (ref 36–48)
HEMOGLOBIN: 14 G/DL (ref 12–16)
LV EF: 65 %
LVEF MODALITY: NORMAL
LYMPHOCYTES ABSOLUTE: 1.5 K/UL (ref 1–5.1)
LYMPHOCYTES RELATIVE PERCENT: 30.7 %
MCH RBC QN AUTO: 31.1 PG (ref 26–34)
MCHC RBC AUTO-ENTMCNC: 33.6 G/DL (ref 31–36)
MCV RBC AUTO: 92.5 FL (ref 80–100)
MONOCYTES ABSOLUTE: 0.4 K/UL (ref 0–1.3)
MONOCYTES RELATIVE PERCENT: 9.1 %
NEUTROPHILS ABSOLUTE: 2.7 K/UL (ref 1.7–7.7)
NEUTROPHILS RELATIVE PERCENT: 55.2 %
PDW BLD-RTO: 13.7 % (ref 12.4–15.4)
PERFORMED ON: ABNORMAL
PLATELET # BLD: 84 K/UL (ref 135–450)
PMV BLD AUTO: 9.6 FL (ref 5–10.5)
POTASSIUM REFLEX MAGNESIUM: 4.1 MMOL/L (ref 3.5–5.1)
RBC # BLD: 4.51 M/UL (ref 4–5.2)
SODIUM BLD-SCNC: 140 MMOL/L (ref 136–145)
WBC # BLD: 4.8 K/UL (ref 4–11)

## 2019-01-07 PROCEDURE — 36415 COLL VENOUS BLD VENIPUNCTURE: CPT

## 2019-01-07 PROCEDURE — G8989 SELF CARE D/C STATUS: HCPCS

## 2019-01-07 PROCEDURE — 96372 THER/PROPH/DIAG INJ SC/IM: CPT

## 2019-01-07 PROCEDURE — G8988 SELF CARE GOAL STATUS: HCPCS

## 2019-01-07 PROCEDURE — 70551 MRI BRAIN STEM W/O DYE: CPT

## 2019-01-07 PROCEDURE — 6370000000 HC RX 637 (ALT 250 FOR IP): Performed by: INTERNAL MEDICINE

## 2019-01-07 PROCEDURE — 97161 PT EVAL LOW COMPLEX 20 MIN: CPT

## 2019-01-07 PROCEDURE — 85025 COMPLETE CBC W/AUTO DIFF WBC: CPT

## 2019-01-07 PROCEDURE — G8979 MOBILITY GOAL STATUS: HCPCS

## 2019-01-07 PROCEDURE — 97165 OT EVAL LOW COMPLEX 30 MIN: CPT

## 2019-01-07 PROCEDURE — 99239 HOSP IP/OBS DSCHRG MGMT >30: CPT | Performed by: INTERNAL MEDICINE

## 2019-01-07 PROCEDURE — G8987 SELF CARE CURRENT STATUS: HCPCS

## 2019-01-07 PROCEDURE — 80048 BASIC METABOLIC PNL TOTAL CA: CPT

## 2019-01-07 PROCEDURE — 6360000002 HC RX W HCPCS: Performed by: STUDENT IN AN ORGANIZED HEALTH CARE EDUCATION/TRAINING PROGRAM

## 2019-01-07 PROCEDURE — G8980 MOBILITY D/C STATUS: HCPCS

## 2019-01-07 PROCEDURE — 6370000000 HC RX 637 (ALT 250 FOR IP): Performed by: STUDENT IN AN ORGANIZED HEALTH CARE EDUCATION/TRAINING PROGRAM

## 2019-01-07 PROCEDURE — G8978 MOBILITY CURRENT STATUS: HCPCS

## 2019-01-07 PROCEDURE — 2580000003 HC RX 258: Performed by: STUDENT IN AN ORGANIZED HEALTH CARE EDUCATION/TRAINING PROGRAM

## 2019-01-07 PROCEDURE — 93306 TTE W/DOPPLER COMPLETE: CPT

## 2019-01-07 RX ORDER — AMLODIPINE BESYLATE 5 MG/1
5 TABLET ORAL DAILY
Qty: 30 TABLET | Refills: 3 | Status: SHIPPED | OUTPATIENT
Start: 2019-01-08 | End: 2019-01-07 | Stop reason: HOSPADM

## 2019-01-07 RX ORDER — AMLODIPINE BESYLATE 2.5 MG/1
7.5 TABLET ORAL DAILY
Qty: 90 TABLET | Refills: 3 | Status: SHIPPED | OUTPATIENT
Start: 2019-01-07 | End: 2019-01-07

## 2019-01-07 RX ORDER — BUSPIRONE HYDROCHLORIDE 5 MG/1
5 TABLET ORAL 2 TIMES DAILY
Qty: 60 TABLET | Refills: 1 | Status: SHIPPED | OUTPATIENT
Start: 2019-01-07 | End: 2019-04-15

## 2019-01-07 RX ORDER — AMLODIPINE BESYLATE 2.5 MG/1
2.5 TABLET ORAL DAILY
Qty: 30 TABLET | Refills: 3 | Status: SHIPPED | OUTPATIENT
Start: 2019-01-07 | End: 2019-05-13 | Stop reason: SDUPTHER

## 2019-01-07 RX ADMIN — ASPIRIN 81 MG 81 MG: 81 TABLET ORAL at 08:10

## 2019-01-07 RX ADMIN — SODIUM CHLORIDE: 900 INJECTION, SOLUTION INTRAVENOUS at 10:00

## 2019-01-07 RX ADMIN — ENOXAPARIN SODIUM 40 MG: 40 INJECTION SUBCUTANEOUS at 08:11

## 2019-01-07 RX ADMIN — CETIRIZINE HYDROCHLORIDE 10 MG: 10 TABLET, FILM COATED ORAL at 08:10

## 2019-01-07 RX ADMIN — PANTOPRAZOLE SODIUM 40 MG: 40 TABLET, DELAYED RELEASE ORAL at 08:10

## 2019-01-07 RX ADMIN — AMLODIPINE BESYLATE 7.5 MG: 5 TABLET ORAL at 08:10

## 2019-01-07 RX ADMIN — PRAVASTATIN SODIUM 40 MG: 40 TABLET ORAL at 08:10

## 2019-01-07 ASSESSMENT — PAIN SCALES - GENERAL
PAINLEVEL_OUTOF10: 0

## 2019-01-08 ENCOUNTER — TELEPHONE (OUTPATIENT)
Dept: INTERNAL MEDICINE CLINIC | Age: 84
End: 2019-01-08

## 2019-01-08 ENCOUNTER — CARE COORDINATION (OUTPATIENT)
Dept: CASE MANAGEMENT | Age: 84
End: 2019-01-08

## 2019-01-08 DIAGNOSIS — E78.00 HYPERCHOLESTEREMIA: ICD-10-CM

## 2019-01-08 DIAGNOSIS — R55 SYNCOPE AND COLLAPSE: Primary | ICD-10-CM

## 2019-01-08 PROCEDURE — 1111F DSCHRG MED/CURRENT MED MERGE: CPT | Performed by: INTERNAL MEDICINE

## 2019-01-08 RX ORDER — PRAVASTATIN SODIUM 40 MG
40 TABLET ORAL DAILY
Qty: 90 TABLET | Refills: 1 | Status: SHIPPED | OUTPATIENT
Start: 2019-01-08 | End: 2019-05-21 | Stop reason: SDUPTHER

## 2019-01-14 ENCOUNTER — OFFICE VISIT (OUTPATIENT)
Dept: INTERNAL MEDICINE CLINIC | Age: 84
End: 2019-01-14
Payer: MEDICARE

## 2019-01-14 VITALS
HEART RATE: 70 BPM | DIASTOLIC BLOOD PRESSURE: 58 MMHG | BODY MASS INDEX: 30.65 KG/M2 | OXYGEN SATURATION: 96 % | SYSTOLIC BLOOD PRESSURE: 112 MMHG | WEIGHT: 173 LBS

## 2019-01-14 DIAGNOSIS — D69.6 THROMBOCYTOPENIA (HCC): ICD-10-CM

## 2019-01-14 DIAGNOSIS — E11.9 TYPE 2 DIABETES MELLITUS WITHOUT COMPLICATION, WITHOUT LONG-TERM CURRENT USE OF INSULIN (HCC): ICD-10-CM

## 2019-01-14 DIAGNOSIS — I25.10 ATHEROSCLEROSIS OF CORONARY ARTERY OF NATIVE HEART WITHOUT ANGINA PECTORIS, UNSPECIFIED VESSEL OR LESION TYPE: ICD-10-CM

## 2019-01-14 DIAGNOSIS — I10 ESSENTIAL HYPERTENSION, BENIGN: ICD-10-CM

## 2019-01-14 DIAGNOSIS — R55 NEAR SYNCOPE: Primary | ICD-10-CM

## 2019-01-14 PROCEDURE — 1111F DSCHRG MED/CURRENT MED MERGE: CPT | Performed by: INTERNAL MEDICINE

## 2019-01-14 PROCEDURE — 99214 OFFICE O/P EST MOD 30 MIN: CPT | Performed by: INTERNAL MEDICINE

## 2019-01-14 PROCEDURE — 93880 EXTRACRANIAL BILAT STUDY: CPT | Performed by: INTERNAL MEDICINE

## 2019-01-14 ASSESSMENT — ENCOUNTER SYMPTOMS
CHEST TIGHTNESS: 0
VOMITING: 0
NAUSEA: 0
SHORTNESS OF BREATH: 0
BLOOD IN STOOL: 0
ABDOMINAL PAIN: 0

## 2019-01-17 ENCOUNTER — CARE COORDINATION (OUTPATIENT)
Dept: CASE MANAGEMENT | Age: 84
End: 2019-01-17

## 2019-01-17 ENCOUNTER — HOSPITAL ENCOUNTER (OUTPATIENT)
Dept: VASCULAR LAB | Age: 84
Discharge: HOME | End: 2019-01-17
Payer: MEDICARE

## 2019-01-17 DIAGNOSIS — D69.6 THROMBOCYTOPENIA (HCC): ICD-10-CM

## 2019-01-17 LAB — BLOOD SMEAR REVIEW: NORMAL

## 2019-01-17 PROCEDURE — 93880 EXTRACRANIAL BILAT STUDY: CPT

## 2019-01-18 LAB — HEMATOLOGY PATH CONSULT: NORMAL

## 2019-01-22 ENCOUNTER — CARE COORDINATION (OUTPATIENT)
Dept: CASE MANAGEMENT | Age: 84
End: 2019-01-22

## 2019-01-28 ENCOUNTER — HOSPITAL ENCOUNTER (OUTPATIENT)
Age: 84
Setting detail: OUTPATIENT SURGERY
Discharge: HOME | End: 2019-01-28
Attending: INTERNAL MEDICINE | Admitting: INTERNAL MEDICINE
Payer: MEDICARE

## 2019-01-28 VITALS
OXYGEN SATURATION: 93 % | SYSTOLIC BLOOD PRESSURE: 115 MMHG | DIASTOLIC BLOOD PRESSURE: 58 MMHG | HEIGHT: 64 IN | RESPIRATION RATE: 16 BRPM | WEIGHT: 170 LBS | BODY MASS INDEX: 29.02 KG/M2 | HEART RATE: 62 BPM | TEMPERATURE: 96.8 F

## 2019-01-28 LAB
GLUCOSE BLD-MCNC: 124 MG/DL (ref 70–99)
PERFORMED ON: ABNORMAL

## 2019-01-28 PROCEDURE — 99153 MOD SED SAME PHYS/QHP EA: CPT | Performed by: INTERNAL MEDICINE

## 2019-01-28 PROCEDURE — 88342 IMHCHEM/IMCYTCHM 1ST ANTB: CPT

## 2019-01-28 PROCEDURE — 2580000003 HC RX 258: Performed by: INTERNAL MEDICINE

## 2019-01-28 PROCEDURE — 7100000011 HC PHASE II RECOVERY - ADDTL 15 MIN: Performed by: INTERNAL MEDICINE

## 2019-01-28 PROCEDURE — 3609010300 HC COLONOSCOPY W/BIOPSY SINGLE/MULTIPLE: Performed by: INTERNAL MEDICINE

## 2019-01-28 PROCEDURE — 6370000000 HC RX 637 (ALT 250 FOR IP): Performed by: INTERNAL MEDICINE

## 2019-01-28 PROCEDURE — 3609009900 HC COLONOSCOPY W/CONTROL BLEEDING ANY METHOD: Performed by: INTERNAL MEDICINE

## 2019-01-28 PROCEDURE — 7100000010 HC PHASE II RECOVERY - FIRST 15 MIN: Performed by: INTERNAL MEDICINE

## 2019-01-28 PROCEDURE — 3609010200 HC COLONOSCOPY ABLATION TUMOR POLYP/OTHER LES: Performed by: INTERNAL MEDICINE

## 2019-01-28 PROCEDURE — 3609012400 HC EGD TRANSORAL BIOPSY SINGLE/MULTIPLE: Performed by: INTERNAL MEDICINE

## 2019-01-28 PROCEDURE — 2709999900 HC NON-CHARGEABLE SUPPLY: Performed by: INTERNAL MEDICINE

## 2019-01-28 PROCEDURE — 6360000002 HC RX W HCPCS: Performed by: INTERNAL MEDICINE

## 2019-01-28 PROCEDURE — 99152 MOD SED SAME PHYS/QHP 5/>YRS: CPT | Performed by: INTERNAL MEDICINE

## 2019-01-28 PROCEDURE — 2720000010 HC SURG SUPPLY STERILE: Performed by: INTERNAL MEDICINE

## 2019-01-28 PROCEDURE — 88305 TISSUE EXAM BY PATHOLOGIST: CPT

## 2019-01-28 RX ORDER — GENTAMICIN SULFATE 100 MG/100ML
60 INJECTION, SOLUTION INTRAVENOUS ONCE
Status: DISCONTINUED | OUTPATIENT
Start: 2019-01-28 | End: 2019-01-28 | Stop reason: SDUPTHER

## 2019-01-28 RX ORDER — VANCOMYCIN HYDROCHLORIDE 500 MG/10ML
1 INJECTION, POWDER, LYOPHILIZED, FOR SOLUTION INTRAVENOUS ONCE
Status: DISCONTINUED | OUTPATIENT
Start: 2019-01-28 | End: 2019-01-28 | Stop reason: SDUPTHER

## 2019-01-28 RX ORDER — MEPERIDINE HYDROCHLORIDE 25 MG/ML
INJECTION INTRAMUSCULAR; INTRAVENOUS; SUBCUTANEOUS PRN
Status: DISCONTINUED | OUTPATIENT
Start: 2019-01-28 | End: 2019-01-28 | Stop reason: HOSPADM

## 2019-01-28 RX ORDER — MEPERIDINE HYDROCHLORIDE 50 MG/ML
INJECTION INTRAMUSCULAR; INTRAVENOUS; SUBCUTANEOUS PRN
Status: DISCONTINUED | OUTPATIENT
Start: 2019-01-28 | End: 2019-01-28 | Stop reason: HOSPADM

## 2019-01-28 RX ORDER — MIDAZOLAM HYDROCHLORIDE 1 MG/ML
INJECTION INTRAMUSCULAR; INTRAVENOUS PRN
Status: DISCONTINUED | OUTPATIENT
Start: 2019-01-28 | End: 2019-01-28 | Stop reason: HOSPADM

## 2019-01-28 RX ADMIN — VANCOMYCIN HYDROCHLORIDE 1000 MG: 10 INJECTION, POWDER, LYOPHILIZED, FOR SOLUTION INTRAVENOUS at 11:10

## 2019-01-28 RX ADMIN — GENTAMICIN SULFATE 60 MG: 40 INJECTION, SOLUTION INTRAMUSCULAR; INTRAVENOUS at 10:57

## 2019-01-28 ASSESSMENT — PAIN - FUNCTIONAL ASSESSMENT: PAIN_FUNCTIONAL_ASSESSMENT: 0-10

## 2019-02-27 DIAGNOSIS — E55.9 VITAMIN D DEFICIENCY: ICD-10-CM

## 2019-02-27 RX ORDER — ERGOCALCIFEROL 1.25 MG/1
50000 CAPSULE ORAL WEEKLY
Qty: 4 CAPSULE | Refills: 5 | Status: SHIPPED | OUTPATIENT
Start: 2019-02-27 | End: 2019-08-30 | Stop reason: SDUPTHER

## 2019-04-15 ENCOUNTER — OFFICE VISIT (OUTPATIENT)
Dept: INTERNAL MEDICINE CLINIC | Age: 84
End: 2019-04-15
Payer: MEDICARE

## 2019-04-15 VITALS
DIASTOLIC BLOOD PRESSURE: 68 MMHG | WEIGHT: 174 LBS | BODY MASS INDEX: 27.97 KG/M2 | SYSTOLIC BLOOD PRESSURE: 126 MMHG | HEIGHT: 66 IN

## 2019-04-15 DIAGNOSIS — D69.6 THROMBOCYTOPENIA (HCC): Chronic | ICD-10-CM

## 2019-04-15 DIAGNOSIS — E78.00 HYPERCHOLESTEREMIA: ICD-10-CM

## 2019-04-15 DIAGNOSIS — J45.20 MILD INTERMITTENT ASTHMA WITHOUT COMPLICATION: ICD-10-CM

## 2019-04-15 DIAGNOSIS — I10 ESSENTIAL HYPERTENSION, BENIGN: Primary | ICD-10-CM

## 2019-04-15 DIAGNOSIS — E11.9 TYPE 2 DIABETES MELLITUS WITHOUT COMPLICATION, WITHOUT LONG-TERM CURRENT USE OF INSULIN (HCC): ICD-10-CM

## 2019-04-15 DIAGNOSIS — I25.10 ATHEROSCLEROSIS OF CORONARY ARTERY OF NATIVE HEART WITHOUT ANGINA PECTORIS, UNSPECIFIED VESSEL OR LESION TYPE: ICD-10-CM

## 2019-04-15 PROCEDURE — G8510 SCR DEP NEG, NO PLAN REQD: HCPCS | Performed by: INTERNAL MEDICINE

## 2019-04-15 PROCEDURE — 99214 OFFICE O/P EST MOD 30 MIN: CPT | Performed by: INTERNAL MEDICINE

## 2019-04-15 ASSESSMENT — PATIENT HEALTH QUESTIONNAIRE - PHQ9
1. LITTLE INTEREST OR PLEASURE IN DOING THINGS: 0
SUM OF ALL RESPONSES TO PHQ QUESTIONS 1-9: 0
SUM OF ALL RESPONSES TO PHQ QUESTIONS 1-9: 0
SUM OF ALL RESPONSES TO PHQ9 QUESTIONS 1 & 2: 0
2. FEELING DOWN, DEPRESSED OR HOPELESS: 0

## 2019-04-15 ASSESSMENT — ENCOUNTER SYMPTOMS
DIARRHEA: 0
COUGH: 1
NAUSEA: 0
SHORTNESS OF BREATH: 0
CONSTIPATION: 0
CHEST TIGHTNESS: 0
VOMITING: 0
SORE THROAT: 0
BLOOD IN STOOL: 0
ABDOMINAL PAIN: 0

## 2019-04-15 NOTE — PROGRESS NOTES
Outpatient Note for established Patient - regular Visit    History Obtained From:  patient, electronic medical record  Is the patient new to me ? - No    HISTORY OF PRESENT ILLNESS:   The patient is a 80 y.o. female who is here today for :  Thalia was seen today for hypertension. Diagnoses and all orders for this visit:    Atherosclerosis of coronary artery of native heart without angina pectoris, unspecified vessel or lesion type  She is on Aspirin , statin     Hypertension  BP is controlled today  She is on Amlodipine 2.5 mg   No reported side effects    Hypercholesteremia  Lab Results   Component Value Date    CHOL 160 11/27/2018    CHOL 159 08/14/2018    CHOL 161 01/02/2018     Lab Results   Component Value Date    TRIG 242 (H) 11/27/2018    TRIG 151 (H) 08/14/2018    TRIG 183 (H) 01/02/2018     Lab Results   Component Value Date    HDL 40 11/27/2018    HDL 46 08/14/2018    HDL 39 (L) 01/02/2018     Lab Results   Component Value Date    LDLCALC 72 11/27/2018    LDLCALC 83 08/14/2018    LDLCALC 85 01/02/2018     Lab Results   Component Value Date    LABVLDL 48 11/27/2018    LABVLDL 30 08/14/2018    LABVLDL 37 01/02/2018   she is on Pravachol   No side effects    Type 2 diabetes mellitus without complication, without long-term current use of insulin ([address]  Lab Results   Component Value Date    LABA1C 6.0 01/05/2019     Lab Results   Component Value Date    .5 01/05/2019   she is on Victoza  No reported side effects. No hypoglycemia events (she only checks intermittently  Usually -120 mg%  Last eye doctor visit 2/2019- no reported eye problems,     She has mild cough. She took mucinex which helped but now she has cough again. No sore throat. No fever/ chills. She has white phlegm. Pt denies CP/SOB/palpitations/abdominal pain/N/V.      Past Medical History:        Diagnosis Date    Allergic rhinitis, cause unspecified     Allergic urticaria     Angioneurotic edema not elsewhere classified     Aortic stenosis 12/9/2011    dx 11/2007 - echo - LVEF 55% , AV area 1.5 cm , peak velocity - 27 ECHO 6/2009 - LVEF 60%, AV area 1.3 cm , peak velocity 35  ECHO 12/1/2011 - LVEF -60-65%, AV area 0.99 cm , peak velocity 34  asymptomatic      Benign neoplasm of colon     Cancer ([address]     cervical 1964    Carcinoma in situ of cervix uteri     Coronary atherosclerosis of unspecified type of vessel, native or graft     Essential hypertension, benign     History of kidney stones     Internal hemorrhoids without mention of complication     MI, old 2007    Other and unspecified hyperlipidemia     Palpitations     Peptic ulcer, unspecified site, unspecified as acute or chronic, without mention of hemorrhage, perforation, or obstruction     Sebaceous cyst of breast 5/31/2012    Trigger finger 1/8/2013    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 She has never used smokeless tobacco.  ETOH:   reports that she drinks alcohol. Current Medications:    Prior to Admission medications    Medication Sig Start Date End Date Taking?  Authorizing Provider   vitamin D (ERGOCALCIFEROL) 55405 units CAPS capsule Take 1 capsule by mouth once a week 2/27/19  Yes Gloria Aragon MD   pravastatin (PRAVACHOL) 40 MG tablet Take 1 tablet by mouth daily 1/8/19  Yes Gloria Aragon MD   amLODIPine (NORVASC) 2.5 MG tablet Take 1 tablet by mouth daily 1/7/19  Yes Donna Shaw MD   RaNITidine HCl (ZANTAC 150 MAXIMUM STRENGTH PO) Take 1 tablet by mouth 2 times daily   Yes Historical Provider, MD   Multiple Vitamins-Minerals (ONE-A-DAY WOMENS 50 PLUS PO) Take 1 capsule by mouth daily   Yes Historical Provider, MD   cetirizine (ZYRTEC) 10 MG tablet Take 10 mg by mouth daily   Yes Historical Provider, MD   Liraglutide (VICTOZA) 18 MG/3ML SOPN SC injection Inject 0.6 mg into the skin daily DIAL AND INJECT SUBCUTANEOUSLY 0.6 MG DAILY 8/21/18  Yes Art Schmitz MD   ACCU-CHEK BERNABE PLUS strip TEST BLOOD SUGAR ONE TIME A DAY OR AS DIRECTED BY PHYSICIAN. 5/20/15  Yes Oralia Ramirez MD   aspirin 81 MG tablet Take 81 mg by mouth daily. Yes Historical Provider, MD       REVIEW OF SYSTEMS:  Review of Systems   Constitutional: Negative for activity change, appetite change, chills, fever and unexpected weight change. HENT: Positive for congestion. Negative for sore throat. Eyes: Negative for visual disturbance. Respiratory: Positive for cough. Negative for chest tightness and shortness of breath. Cardiovascular: Negative for chest pain. Gastrointestinal: Negative for abdominal pain, blood in stool, constipation, diarrhea, nausea and vomiting. Genitourinary: Negative for difficulty urinating and hematuria. Skin: Negative for rash. Allergic/Immunologic: Negative for immunocompromised state. Neurological: Negative for dizziness, weakness, numbness and headaches. Psychiatric/Behavioral: Negative for dysphoric mood and suicidal ideas. Physical Exam:      Vitals: /68 (Site: Right Upper Arm)   Ht 5' 6\" (1.676 m)   Wt 174 lb (78.9 kg)   BMI 28.08 kg/m²     Body mass index is 28.08 kg/m². Wt Readings from Last 3 Encounters:   04/15/19 174 lb (78.9 kg)   01/28/19 170 lb (77.1 kg)   01/14/19 173 lb (78.5 kg)     Physical Exam   Constitutional: She is oriented to person, place, and time. She appears well-developed and well-nourished. No distress. HENT:   Head: Normocephalic and atraumatic. Mouth/Throat: Oropharynx is clear and moist. No oropharyngeal exudate.    Eyes: Conjunctivae and EOM are normal. Right eye exhibits no discharge. Left eye exhibits no discharge. No scleral icterus. Neck: Normal range of motion. Neck supple. Cardiovascular: Normal rate and normal heart sounds. No murmur (systlic RUSB 3/6) heard. Pulmonary/Chest: Effort normal and breath sounds normal. No respiratory distress. She has no wheezes. She has no rales. Abdominal: Soft. She exhibits no distension. There is no tenderness. Musculoskeletal: She exhibits no deformity. Lymphadenopathy:        Head (right side): No submental and no submandibular adenopathy present. Head (left side): No submental and no submandibular adenopathy present. She has no cervical adenopathy. Right cervical: No superficial cervical and no deep cervical adenopathy present. Left cervical: No superficial cervical and no deep cervical adenopathy present. Neurological: She is alert and oriented to person, place, and time. She has normal reflexes. She exhibits normal muscle tone. GCS eye subscore is 4. GCS verbal subscore is 5. GCS motor subscore is 6. Skin: No rash noted. She is not diaphoretic. Psychiatric: She has a normal mood and affect. Her behavior is normal. Thought content normal.        Assessment/Plan:   Ada was seen today for hypertension. Diagnoses and all orders for this visit:    Hypertension  Pressure is controlled. No changes in medications were current. Monitor blood pressure   -     Comprehensive Metabolic Panel; Future  -     CBC Auto Differential; Future  -     Lipid Panel; Future    Thrombocytopenia (HCC)  He has chronic thrombocytopenia with mild worsening recently. No other lineage drop which shows isolate thrombocytopenia. Last blood smear was negative which is against hemolysis or pseudo-thrombocytopenia with clumps. We will recheck CBC, hep C and consider further investigation accordingly  -     CBC Auto Differential; Future  -     HEPATITIS C ANTIBODY;  Future    Atherosclerosis of coronary artery of native heart without angina pectoris, unspecified vessel or lesion type  Well controlled- no changes in medication today. No changes in meds  -     Comprehensive Metabolic Panel; Future  -     CBC Auto Differential; Future    Hypercholesteremia  Well controlled- no changes in medication today. Triglycerides were a little bit high we will recheck again    Type 2 diabetes mellitus without complication, without long-term current use of insulin ([address]  Very well controlled, she may not need Victoza for long time but for now no changes in medications  -     Comprehensive Metabolic Panel; Future  -     CBC Auto Differential; Future  -     Hemoglobin A1C; Future  -     Lipid Panel; Future    Mild intermittent asthma without complication  She is now off all medications reported side effects. Is likely remanent of recent upper respiratory infection versus allergies she will let me know if not better or worse    - Patient was encouraged to callthe office (and ask to see me) or be seen by other provider for any worsening or lack    of improvement in his symptoms.     - Pt was asked toschedule an appointment in 3 months, and to let me know of any scheduling difficulties. Additional patients instructions (if given): There are no Patient Instructions on file for this visit.     Chris Padilla M.D.   4/15/2019, 9:54 AM

## 2019-04-19 DIAGNOSIS — D69.6 THROMBOCYTOPENIA (HCC): Chronic | ICD-10-CM

## 2019-04-19 DIAGNOSIS — E11.9 TYPE 2 DIABETES MELLITUS WITHOUT COMPLICATION, WITHOUT LONG-TERM CURRENT USE OF INSULIN (HCC): ICD-10-CM

## 2019-04-19 DIAGNOSIS — I25.10 ATHEROSCLEROSIS OF CORONARY ARTERY OF NATIVE HEART WITHOUT ANGINA PECTORIS, UNSPECIFIED VESSEL OR LESION TYPE: ICD-10-CM

## 2019-04-19 DIAGNOSIS — I10 ESSENTIAL HYPERTENSION, BENIGN: ICD-10-CM

## 2019-04-19 LAB
A/G RATIO: 1.3 (ref 1.1–2.2)
ALBUMIN SERPL-MCNC: 4 G/DL (ref 3.4–5)
ALP BLD-CCNC: 89 U/L (ref 40–129)
ALT SERPL-CCNC: 32 U/L (ref 10–40)
ANION GAP SERPL CALCULATED.3IONS-SCNC: 13 MMOL/L (ref 3–16)
AST SERPL-CCNC: 56 U/L (ref 15–37)
BASOPHILS ABSOLUTE: 0 K/UL (ref 0–0.2)
BASOPHILS RELATIVE PERCENT: 0.7 %
BILIRUB SERPL-MCNC: 1 MG/DL (ref 0–1)
BUN BLDV-MCNC: 8 MG/DL (ref 7–20)
CALCIUM SERPL-MCNC: 8.7 MG/DL (ref 8.3–10.6)
CHLORIDE BLD-SCNC: 103 MMOL/L (ref 99–110)
CHOLESTEROL, TOTAL: 122 MG/DL (ref 0–199)
CO2: 26 MMOL/L (ref 21–32)
CREAT SERPL-MCNC: 0.6 MG/DL (ref 0.6–1.2)
EOSINOPHILS ABSOLUTE: 0.3 K/UL (ref 0–0.6)
EOSINOPHILS RELATIVE PERCENT: 6.3 %
GFR AFRICAN AMERICAN: >60
GFR NON-AFRICAN AMERICAN: >60
GLOBULIN: 3.1 G/DL
GLUCOSE BLD-MCNC: 151 MG/DL (ref 70–99)
HCT VFR BLD CALC: 45.8 % (ref 36–48)
HDLC SERPL-MCNC: 33 MG/DL (ref 40–60)
HEMOGLOBIN: 15.5 G/DL (ref 12–16)
HEPATITIS C ANTIBODY INTERPRETATION: NORMAL
LDL CHOLESTEROL CALCULATED: 67 MG/DL
LYMPHOCYTES ABSOLUTE: 1.1 K/UL (ref 1–5.1)
LYMPHOCYTES RELATIVE PERCENT: 23.7 %
MCH RBC QN AUTO: 31.3 PG (ref 26–34)
MCHC RBC AUTO-ENTMCNC: 33.7 G/DL (ref 31–36)
MCV RBC AUTO: 92.7 FL (ref 80–100)
MONOCYTES ABSOLUTE: 0.5 K/UL (ref 0–1.3)
MONOCYTES RELATIVE PERCENT: 11.5 %
NEUTROPHILS ABSOLUTE: 2.7 K/UL (ref 1.7–7.7)
NEUTROPHILS RELATIVE PERCENT: 57.8 %
PDW BLD-RTO: 14.1 % (ref 12.4–15.4)
PLATELET # BLD: 89 K/UL (ref 135–450)
PMV BLD AUTO: 9.7 FL (ref 5–10.5)
POTASSIUM SERPL-SCNC: 4.3 MMOL/L (ref 3.5–5.1)
RBC # BLD: 4.94 M/UL (ref 4–5.2)
SODIUM BLD-SCNC: 142 MMOL/L (ref 136–145)
TOTAL PROTEIN: 7.1 G/DL (ref 6.4–8.2)
TRIGL SERPL-MCNC: 109 MG/DL (ref 0–150)
VLDLC SERPL CALC-MCNC: 22 MG/DL
WBC # BLD: 4.7 K/UL (ref 4–11)

## 2019-04-20 LAB
ESTIMATED AVERAGE GLUCOSE: 142.7 MG/DL
HBA1C MFR BLD: 6.6 %

## 2019-05-13 RX ORDER — AMLODIPINE BESYLATE 2.5 MG/1
2.5 TABLET ORAL DAILY
Qty: 30 TABLET | Refills: 5 | Status: SHIPPED | OUTPATIENT
Start: 2019-05-13 | End: 2019-09-11 | Stop reason: SDUPTHER

## 2019-05-13 NOTE — TELEPHONE ENCOUNTER
Refill request:      amLODIPine (NORVASC) 2.5 MG tablet [[id number]    [facility], [address]

## 2019-05-21 DIAGNOSIS — I10 ESSENTIAL HYPERTENSION, BENIGN: Primary | ICD-10-CM

## 2019-05-21 DIAGNOSIS — E78.00 HYPERCHOLESTEREMIA: ICD-10-CM

## 2019-05-21 RX ORDER — PRAVASTATIN SODIUM 40 MG
40 TABLET ORAL DAILY
Qty: 90 TABLET | Refills: 1 | Status: SHIPPED | OUTPATIENT
Start: 2019-05-21 | End: 2020-02-20

## 2019-05-29 ENCOUNTER — APPOINTMENT (OUTPATIENT)
Dept: GENERAL RADIOLOGY | Age: 84
DRG: 314 | End: 2019-05-29
Payer: MEDICARE

## 2019-05-29 ENCOUNTER — TELEPHONE (OUTPATIENT)
Dept: INTERNAL MEDICINE CLINIC | Age: 84
End: 2019-05-29

## 2019-05-29 ENCOUNTER — HOSPITAL ENCOUNTER (INPATIENT)
Age: 84
LOS: 5 days | Discharge: HOME HEALTH | DRG: 314 | End: 2019-06-03
Attending: EMERGENCY MEDICINE | Admitting: INTERNAL MEDICINE
Payer: MEDICARE

## 2019-05-29 DIAGNOSIS — R50.9 FEVER, UNSPECIFIED FEVER CAUSE: Primary | ICD-10-CM

## 2019-05-29 LAB
A/G RATIO: 1.3 (ref 1.1–2.2)
ALBUMIN SERPL-MCNC: 3.9 G/DL (ref 3.4–5)
ALP BLD-CCNC: 83 U/L (ref 40–129)
ALT SERPL-CCNC: 36 U/L (ref 10–40)
ANION GAP SERPL CALCULATED.3IONS-SCNC: 10 MMOL/L (ref 3–16)
AST SERPL-CCNC: 65 U/L (ref 15–37)
BASE EXCESS VENOUS: 0 (ref -3–3)
BASOPHILS ABSOLUTE: 0 K/UL (ref 0–0.2)
BASOPHILS RELATIVE PERCENT: 0.3 %
BILIRUB SERPL-MCNC: 1.2 MG/DL (ref 0–1)
BILIRUBIN URINE: NEGATIVE
BLOOD, URINE: NEGATIVE
BUN BLDV-MCNC: 8 MG/DL (ref 7–20)
C-REACTIVE PROTEIN: 8.1 MG/L (ref 0–5.1)
CALCIUM SERPL-MCNC: 9.4 MG/DL (ref 8.3–10.6)
CHLORIDE BLD-SCNC: 103 MMOL/L (ref 99–110)
CLARITY: CLEAR
CO2: 23 MMOL/L (ref 21–32)
COLOR: YELLOW
CREAT SERPL-MCNC: <0.5 MG/DL (ref 0.6–1.2)
EKG ATRIAL RATE: 74 BPM
EKG DIAGNOSIS: NORMAL
EKG P AXIS: 57 DEGREES
EKG P-R INTERVAL: 166 MS
EKG Q-T INTERVAL: 420 MS
EKG QRS DURATION: 130 MS
EKG QTC CALCULATION (BAZETT): 466 MS
EKG R AXIS: -49 DEGREES
EKG T AXIS: 63 DEGREES
EKG VENTRICULAR RATE: 74 BPM
EOSINOPHILS ABSOLUTE: 0.2 K/UL (ref 0–0.6)
EOSINOPHILS RELATIVE PERCENT: 2.5 %
GFR AFRICAN AMERICAN: >60
GFR NON-AFRICAN AMERICAN: >60
GLOBULIN: 3 G/DL
GLUCOSE BLD-MCNC: 104 MG/DL (ref 70–99)
GLUCOSE BLD-MCNC: 121 MG/DL (ref 70–99)
GLUCOSE URINE: NEGATIVE MG/DL
HCO3 VENOUS: 23 MMOL/L (ref 23–29)
HCT VFR BLD CALC: 41.3 % (ref 36–48)
HEMOGLOBIN: 14 G/DL (ref 12–16)
KETONES, URINE: NEGATIVE MG/DL
LACTATE: 1.23 MMOL/L (ref 0.4–2)
LACTIC ACID: 2.1 MMOL/L (ref 0.4–2)
LEUKOCYTE ESTERASE, URINE: NEGATIVE
LYMPHOCYTES ABSOLUTE: 0.4 K/UL (ref 1–5.1)
LYMPHOCYTES RELATIVE PERCENT: 6.5 %
MCH RBC QN AUTO: 31 PG (ref 26–34)
MCHC RBC AUTO-ENTMCNC: 33.9 G/DL (ref 31–36)
MCV RBC AUTO: 91.6 FL (ref 80–100)
MICROSCOPIC EXAMINATION: ABNORMAL
MONOCYTES ABSOLUTE: 0.4 K/UL (ref 0–1.3)
MONOCYTES RELATIVE PERCENT: 6.8 %
NEUTROPHILS ABSOLUTE: 5.2 K/UL (ref 1.7–7.7)
NEUTROPHILS RELATIVE PERCENT: 83.9 %
NITRITE, URINE: NEGATIVE
O2 SAT, VEN: 78 %
PCO2, VEN: 27.7 MM HG (ref 40–50)
PDW BLD-RTO: 14.1 % (ref 12.4–15.4)
PERFORMED ON: ABNORMAL
PERFORMED ON: ABNORMAL
PH UA: 7 (ref 5–8)
PH VENOUS: 7.53 (ref 7.35–7.45)
PLATELET # BLD: 100 K/UL (ref 135–450)
PMV BLD AUTO: 9.6 FL (ref 5–10.5)
PO2, VEN: 37 MM HG
POC SAMPLE TYPE: ABNORMAL
POTASSIUM REFLEX MAGNESIUM: 4 MMOL/L (ref 3.5–5.1)
PROTEIN UA: NEGATIVE MG/DL
RBC # BLD: 4.51 M/UL (ref 4–5.2)
SEDIMENTATION RATE, ERYTHROCYTE: 26 MM/HR (ref 0–30)
SODIUM BLD-SCNC: 136 MMOL/L (ref 136–145)
SPECIFIC GRAVITY UA: 1.01 (ref 1–1.03)
TCO2 CALC VENOUS: 24 MMOL/L
TOTAL PROTEIN: 6.9 G/DL (ref 6.4–8.2)
URINE TYPE: ABNORMAL
UROBILINOGEN, URINE: 2 E.U./DL
WBC # BLD: 6.2 K/UL (ref 4–11)

## 2019-05-29 PROCEDURE — 87801 DETECT AGNT MULT DNA AMPLI: CPT

## 2019-05-29 PROCEDURE — 87040 BLOOD CULTURE FOR BACTERIA: CPT

## 2019-05-29 PROCEDURE — 36415 COLL VENOUS BLD VENIPUNCTURE: CPT

## 2019-05-29 PROCEDURE — 87186 SC STD MICRODIL/AGAR DIL: CPT

## 2019-05-29 PROCEDURE — 1200000000 HC SEMI PRIVATE

## 2019-05-29 PROCEDURE — 80053 COMPREHEN METABOLIC PANEL: CPT

## 2019-05-29 PROCEDURE — 93005 ELECTROCARDIOGRAM TRACING: CPT | Performed by: EMERGENCY MEDICINE

## 2019-05-29 PROCEDURE — 2580000003 HC RX 258: Performed by: EMERGENCY MEDICINE

## 2019-05-29 PROCEDURE — 51701 INSERT BLADDER CATHETER: CPT

## 2019-05-29 PROCEDURE — 96365 THER/PROPH/DIAG IV INF INIT: CPT

## 2019-05-29 PROCEDURE — 2580000003 HC RX 258: Performed by: STUDENT IN AN ORGANIZED HEALTH CARE EDUCATION/TRAINING PROGRAM

## 2019-05-29 PROCEDURE — 6360000002 HC RX W HCPCS: Performed by: EMERGENCY MEDICINE

## 2019-05-29 PROCEDURE — 87086 URINE CULTURE/COLONY COUNT: CPT

## 2019-05-29 PROCEDURE — 81003 URINALYSIS AUTO W/O SCOPE: CPT

## 2019-05-29 PROCEDURE — 96361 HYDRATE IV INFUSION ADD-ON: CPT

## 2019-05-29 PROCEDURE — 6370000000 HC RX 637 (ALT 250 FOR IP): Performed by: EMERGENCY MEDICINE

## 2019-05-29 PROCEDURE — 99285 EMERGENCY DEPT VISIT HI MDM: CPT

## 2019-05-29 PROCEDURE — 6370000000 HC RX 637 (ALT 250 FOR IP): Performed by: STUDENT IN AN ORGANIZED HEALTH CARE EDUCATION/TRAINING PROGRAM

## 2019-05-29 PROCEDURE — 6360000002 HC RX W HCPCS: Performed by: STUDENT IN AN ORGANIZED HEALTH CARE EDUCATION/TRAINING PROGRAM

## 2019-05-29 PROCEDURE — 71045 X-RAY EXAM CHEST 1 VIEW: CPT

## 2019-05-29 PROCEDURE — 85652 RBC SED RATE AUTOMATED: CPT

## 2019-05-29 PROCEDURE — 87076 CULTURE ANAEROBE IDENT EACH: CPT

## 2019-05-29 PROCEDURE — 83605 ASSAY OF LACTIC ACID: CPT

## 2019-05-29 PROCEDURE — 82803 BLOOD GASES ANY COMBINATION: CPT

## 2019-05-29 PROCEDURE — 85025 COMPLETE CBC W/AUTO DIFF WBC: CPT

## 2019-05-29 PROCEDURE — 86140 C-REACTIVE PROTEIN: CPT

## 2019-05-29 RX ORDER — ASPIRIN 81 MG/1
81 TABLET, CHEWABLE ORAL DAILY
Status: DISCONTINUED | OUTPATIENT
Start: 2019-05-29 | End: 2019-06-03 | Stop reason: HOSPADM

## 2019-05-29 RX ORDER — PRAVASTATIN SODIUM 40 MG
40 TABLET ORAL DAILY
Status: DISCONTINUED | OUTPATIENT
Start: 2019-05-30 | End: 2019-06-03 | Stop reason: HOSPADM

## 2019-05-29 RX ORDER — ACETAMINOPHEN 325 MG/1
650 TABLET ORAL ONCE
Status: COMPLETED | OUTPATIENT
Start: 2019-05-29 | End: 2019-05-29

## 2019-05-29 RX ORDER — ONDANSETRON 2 MG/ML
4 INJECTION INTRAMUSCULAR; INTRAVENOUS EVERY 6 HOURS PRN
Status: DISCONTINUED | OUTPATIENT
Start: 2019-05-29 | End: 2019-06-03 | Stop reason: HOSPADM

## 2019-05-29 RX ORDER — 0.9 % SODIUM CHLORIDE 0.9 %
500 INTRAVENOUS SOLUTION INTRAVENOUS ONCE
Status: COMPLETED | OUTPATIENT
Start: 2019-05-29 | End: 2019-05-29

## 2019-05-29 RX ORDER — PANTOPRAZOLE SODIUM 40 MG/1
40 TABLET, DELAYED RELEASE ORAL DAILY
COMMUNITY
End: 2019-12-26

## 2019-05-29 RX ORDER — NICOTINE POLACRILEX 4 MG
15 LOZENGE BUCCAL PRN
Status: DISCONTINUED | OUTPATIENT
Start: 2019-05-29 | End: 2019-06-03 | Stop reason: HOSPADM

## 2019-05-29 RX ORDER — ACETAMINOPHEN 325 MG/1
650 TABLET ORAL EVERY 4 HOURS PRN
Status: DISCONTINUED | OUTPATIENT
Start: 2019-05-29 | End: 2019-05-31

## 2019-05-29 RX ORDER — DEXTROSE MONOHYDRATE 25 G/50ML
12.5 INJECTION, SOLUTION INTRAVENOUS PRN
Status: DISCONTINUED | OUTPATIENT
Start: 2019-05-29 | End: 2019-06-03 | Stop reason: HOSPADM

## 2019-05-29 RX ORDER — SODIUM CHLORIDE 0.9 % (FLUSH) 0.9 %
10 SYRINGE (ML) INJECTION EVERY 12 HOURS SCHEDULED
Status: DISCONTINUED | OUTPATIENT
Start: 2019-05-29 | End: 2019-06-03 | Stop reason: HOSPADM

## 2019-05-29 RX ORDER — SODIUM CHLORIDE 0.9 % (FLUSH) 0.9 %
10 SYRINGE (ML) INJECTION PRN
Status: DISCONTINUED | OUTPATIENT
Start: 2019-05-29 | End: 2019-06-03 | Stop reason: HOSPADM

## 2019-05-29 RX ORDER — FAMOTIDINE 20 MG/1
20 TABLET, FILM COATED ORAL 2 TIMES DAILY
Status: DISCONTINUED | OUTPATIENT
Start: 2019-05-29 | End: 2019-06-03 | Stop reason: HOSPADM

## 2019-05-29 RX ORDER — DEXTROSE MONOHYDRATE 50 MG/ML
100 INJECTION, SOLUTION INTRAVENOUS PRN
Status: DISCONTINUED | OUTPATIENT
Start: 2019-05-29 | End: 2019-06-03 | Stop reason: HOSPADM

## 2019-05-29 RX ORDER — AMLODIPINE BESYLATE 2.5 MG/1
2.5 TABLET ORAL DAILY
Status: DISCONTINUED | OUTPATIENT
Start: 2019-05-30 | End: 2019-05-30

## 2019-05-29 RX ADMIN — SODIUM CHLORIDE 500 ML: 9 INJECTION, SOLUTION INTRAVENOUS at 14:35

## 2019-05-29 RX ADMIN — ASPIRIN 81 MG 81 MG: 81 TABLET ORAL at 22:15

## 2019-05-29 RX ADMIN — ENOXAPARIN SODIUM 40 MG: 40 INJECTION SUBCUTANEOUS at 19:57

## 2019-05-29 RX ADMIN — ACETAMINOPHEN 650 MG: 325 TABLET ORAL at 15:26

## 2019-05-29 RX ADMIN — Medication 10 ML: at 20:00

## 2019-05-29 RX ADMIN — CEFEPIME HYDROCHLORIDE 2 G: 2 INJECTION, POWDER, FOR SOLUTION INTRAVENOUS at 16:38

## 2019-05-29 RX ADMIN — ACETAMINOPHEN 650 MG: 325 TABLET ORAL at 19:55

## 2019-05-29 RX ADMIN — VANCOMYCIN HYDROCHLORIDE 1500 MG: 10 INJECTION, POWDER, LYOPHILIZED, FOR SOLUTION INTRAVENOUS at 17:15

## 2019-05-29 RX ADMIN — FAMOTIDINE 20 MG: 20 TABLET ORAL at 19:57

## 2019-05-29 ASSESSMENT — PAIN SCALES - GENERAL
PAINLEVEL_OUTOF10: 0

## 2019-05-29 ASSESSMENT — ENCOUNTER SYMPTOMS
CONSTIPATION: 0
NAUSEA: 0
SHORTNESS OF BREATH: 0
COUGH: 0
ABDOMINAL PAIN: 0
EYES NEGATIVE: 1
DIARRHEA: 0

## 2019-05-29 NOTE — H&P
Internal Medicine  PGY 1  History & Physical      CC : Fever    History Obtained From:  patient, electronic medical record    HISTORY OF PRESENT ILLNESS:  80 y.o. female with PMH significant for aortic valve replacement (bioprosthetic on 7/12/13), GERD, colonoscopy w/ polyp removals, NIDDM, MI (2007), HTN, AS (last ECHO 5/22/18 : mild grade I diastolic dysfxn, EF 65-58%) who presented with fevers. According to the patient this morning she woke up and felt feverish. Family noted tmax at home to be 101.4. Patient also complained of chills, and urinary frequency, however denied any hematuria or dysuria. Patient also denied any headache, sore throat, nausea, vomiting, chest pain, SOB, abdominal pain, myalgias. Patient denied any orthopnea, PND, or lower extremity edema. Patient states she felt at her baseline yesterday, and denied any recent changes in appetite or medications. In ED: Tmax 101.2, HR: 70s, Bp: 107/51. CBC: unremarkable no leukocytosis (6.2), CMP: unremarkable aside from mild AST elevation (65), however appears pt has hx of chronic transaminitis. UA: unremarkable.      Past Medical History:        Diagnosis Date    Allergic rhinitis, cause unspecified     Allergic urticaria     Angioneurotic edema not elsewhere classified     Aortic stenosis 12/9/2011    dx 11/2007 - echo - LVEF 55% , AV area 1.5 cm , peak velocity - 27 ECHO 6/2009 - LVEF 60%, AV area 1.3 cm , peak velocity 35  ECHO 12/1/2011 - LVEF -60-65%, AV area 0.99 cm , peak velocity 34  asymptomatic      Benign neoplasm of colon     Cancer ([address]     cervical 1964    Carcinoma in situ of cervix uteri     Coronary atherosclerosis of unspecified type of vessel, native or graft     Essential hypertension, benign     History of kidney stones     Internal hemorrhoids without mention of complication     MI, old 2007    Other and unspecified hyperlipidemia     Palpitations     Peptic ulcer, unspecified site, unspecified as acute or chronic, without mention of hemorrhage, perforation, or obstruction     Sebaceous cyst of breast 5/31/2012    Trigger finger 1/8/2013    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 Debra Valentine  6/6/2013     Dr. Maxim Fontaine - coronaries WNL , LVEF WNL    Federico Simmering  5/2012    Dr. Yesi Cao - bilateral    Yolanda Bong    Dr. Peter Woodson    COLONOSCOPY  03/30/2011    DR. Mello  - Internal hemorrhoids, sigmoid diverticulosis, hyperplastic polyps. Repeat in five  years.  COLONOSCOPY  02/23/2009    nternal hemorrhoids, Diverticulosis, Colon polyps. Repeat two years    COLONOSCOPY  5/8/2013     Dr. Janey Arzate  - internal hemorrhoids , diverticulosis and polyps     COLONOSCOPY  5/4/2015, 3/7/2016    - hemorrhoids, diverticulosis, hyperplastic colon polyps, repeat in 1 year    COLONOSCOPY  1/28/2019    [facility]  [facility]    COLONOSCOPY POLYPECTOMY ABLATION [facility]  1/28/2019    COLONOSCOPY CONTROL HEMORRHAGE performed by Alivia Price MD at [address] [facility]  [id number]     DR. Dacosta - CYSTOSCOPY RETROGRADE, RIGHT STENT PLACEMENT    OTHER SURGICAL HISTORY  6/27/2013    Dr. Angella Gonzales - CYSTOSCOPY RIGHT URETEROSCOPY, RENAL STONE EXTRACTION    UPPER GASTROINTESTINAL ENDOSCOPY  3/7/2016    gastritis and esophagitis     UPPER GASTROINTESTINAL ENDOSCOPY N/A 1/28/2019    EGD BIOPSY performed by Jimmy Alcantara MD at [facility]   ·     Medications Priorto Admission:    · Not in a hospital admission. Allergies:  Actos [pioglitazone hydrochloride]; Lisinopril; Penicillins; Lipitor; Zocor [simvastatin]; and Glucophage [metformin hydrochloride]    Social History:   · TOBACCO:   reports that she has never smoked. She has never used smokeless tobacco.  · ETOH:   reports that she drinks alcohol. ·   Family History:       Problem Relation Age of Onset    Heart Disease Father     Cancer Sister         pancreatic cancer    Heart Disease Brother     Heart Disease Brother     Heart Disease Brother    ·     [unfilled]    ROS: A 10 point review of systems was conducted, significant findings as noted in HPI. Physical Exam   Constitutional: She is oriented to person, place, and time. She appears well-developed. No distress. HENT:   Head: Normocephalic and atraumatic. Right Ear: External ear normal.   Left Ear: External ear normal.   Mouth/Throat: Oropharynx is clear and moist. No oropharyngeal exudate. Eyes: Conjunctivae and EOM are normal.   Neck: Normal range of motion. Neck supple. No thyromegaly present. Cardiovascular: Normal rate and regular rhythm. Murmur heard. Pulmonary/Chest: Effort normal and breath sounds normal. No stridor. No respiratory distress. She has no wheezes. She has no rales. She exhibits no tenderness. Abdominal: Soft. She exhibits no distension. There is no tenderness. Musculoskeletal: Normal range of motion. She exhibits no edema, tenderness or deformity. Neurological: She is alert and oriented to person, place, and time. No cranial nerve deficit or sensory deficit. Psychiatric: She has a normal mood and affect.      Physical exam:       Vitals:    05/29/19 1717   BP: (!) 107/38   Pulse: 77   Resp: 18   Temp: 100 °F (37.8 °C)   SpO2: 92%       DATA:    Labs:  CBC:   Recent Labs     05/29/19  1346   WBC 6.2   HGB 14.0 Pamela Morrison MD  5/29/2019,  5:38 PM

## 2019-05-29 NOTE — ED PROVIDER NOTES
[facility]          ATTENDING PHYSICIAN NOTE       Date of evaluation: 5/29/2019    Chief Complaint     Fever; Fatigue; and Urinary Frequency      History of Present Illness     Abi Nicholson is a 80 y.o. female who presents with complaints of fever chills and frequent urination. She is concerned that she has a kidney or urinary tract section and is septic. According to her daughter she had a history of sepsis in the past secondary infected tooth. She denies any abdominal or back pain at this time. Denies any nausea vomiting. Denies any recent cough or congestion. She's had a previous history of aortic round replacement with a bovine valve. Review of Systems     Review of Systems   Constitutional: Positive for chills, fatigue and fever. HENT: Negative. Eyes: Negative. Respiratory: Negative for cough and shortness of breath. Cardiovascular: Negative for chest pain and palpitations. Gastrointestinal: Negative for abdominal pain, constipation, diarrhea and nausea. Genitourinary: Positive for frequency and urgency. Negative for dysuria, vaginal bleeding and vaginal discharge. Musculoskeletal: Negative. Skin: Negative. Neurological: Negative. Hematological: Negative for adenopathy. Psychiatric/Behavioral: Negative. All other systems reviewed and are negative.       Past Medical, Surgical, Family, and Social History     She 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, Aortic stenosis, Benign neoplasm of colon, 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 She has a past surgical history that includes Colonoscopy (03/30/2011); back surgery (1977); Cervix removal (1964); Colonoscopy (02/23/2009); Cataract removal (5/2012); Colonoscopy (5/8/2013 ); Cystocopy ([id number]); Cardiac catheterization (6/6/2013 ); other surgical history (6/27/2013); Aortic valve replacement (7/12/2013 ); Colonoscopy (5/4/2015, 3/7/2016); Upper gastrointestinal endoscopy (3/7/2016); Upper gastrointestinal endoscopy (N/A, 1/28/2019); Colonoscopy (1/28/2019); Colonoscopy (1/28/2019); and Colonoscopy (1/28/2019). Her family history includes Cancer in her sister; Heart Disease in her brother, brother, brother, and father. She reports that she has never smoked. She has never used smokeless tobacco. She reports that she drinks alcohol. She reports that she does not use drugs. Medications     Previous Medications    ACCU-CHEK BERNABE PLUS STRIP    TEST BLOOD SUGAR ONE TIME A DAY OR AS DIRECTED BY PHYSICIAN. AMLODIPINE (NORVASC) 2.5 MG TABLET    Take 1 tablet by mouth daily    ASPIRIN 81 MG TABLET    Take 81 mg by mouth daily.     CETIRIZINE (ZYRTEC) 10 MG TABLET    Take 10 mg by mouth daily    LIRAGLUTIDE (VICTOZA) 18 MG/3ML SOPN SC INJECTION    Inject 0.6 mg into the skin daily DIAL AND INJECT SUBCUTANEOUSLY 0.6 MG DAILY    METOPROLOL TARTRATE (LOPRESSOR) 25 MG TABLET    Take 0.5 tablets by mouth daily    MULTIPLE VITAMINS-MINERALS (ONE-A-DAY WOMENS 50 PLUS PO)    Take 1 capsule by mouth daily    PRAVASTATIN (PRAVACHOL) 40 MG TABLET    Take 1 tablet by mouth daily    RANITIDINE HCL (ZANTAC 150 MAXIMUM STRENGTH PO)    Take 1 tablet by mouth 2 times daily    VITAMIN D (ERGOCALCIFEROL) 73099 UNITS CAPS CAPSULE    Take 1 capsule by mouth once a week       Allergies     She is allergic to actos [pioglitazone hydrochloride]; lisinopril; penicillins; lipitor; zocor [simvastatin]; and glucophage [metformin hydrochloride]. Physical Exam     INITIAL VITALS: BP: (!) 107/51, Temp: 98.9 °F (37.2 °C), Pulse: 77,  , SpO2: 96 %    Physical Exam   Constitutional: She is oriented to person, place, and time. She appears well-developed and well-nourished. No distress. HENT:   Head: Normocephalic. Eyes: Pupils are equal, round, and reactive to light. EOM are normal.   Neck: Neck supple. Cardiovascular: Normal rate, regular rhythm and intact distal pulses. Murmur heard. Pulmonary/Chest: Breath sounds normal. She has no rales. Abdominal: Soft. Bowel sounds are normal. She exhibits no distension. There is no tenderness. There is no guarding. Musculoskeletal: She exhibits no edema. Lymphadenopathy:     She has no cervical adenopathy. Neurological: She is alert and oriented to person, place, and time. Skin: Skin is warm and dry. She is not diaphoretic. Psychiatric: She has a normal mood and affect. Nursing note and vitals reviewed. Diagnostic Results       RADIOLOGY:  XR CHEST PORTABLE   Final Result      Clear lungs. Normal cardiomediastinal silhouette status post sternotomy. LABS:   No results found for this visit on 05/29/19. RECENT VITALS:  BP: (!) 107/51,Temp: 98.9 °F (37.2 °C), Pulse: 77,  , SpO2: 96 %     Procedures         ED Course     Nursing Notes, Past Medical Hx, Past Surgical Hx, Social Hx,Allergies, and Family Hx were reviewed. The patient was given the following medications:  No orders of the defined types were placed in this encounter.       CONSULTS:  None    MEDICAL DECISIONMAKING / ASSESSMENT / PLAN     Tyrone Rueda is a 80 y.o. female presents with urinary frequency and fever. She has had a history of sepsis from an infected tooth in the past. She has a bovine valve and a concern would be endocarditis. She has a normal wbc but urine pending. CXR clear. Pateint turned over to the oncoming physician to follow-up her urine and the rest of her labs and provide final evaluation and disposition        Clinical Impression     No diagnosis found. Disposition     PATIENT REFERRED TO:  No follow-up provider specified.     DISCHARGE MEDICATIONS:  New Prescriptions    No medications on file       DISPOSITION  As per the oncoming physician       Blessing Porras MD  05/29/19 3489

## 2019-05-29 NOTE — ED PROVIDER NOTES
[address] [facility] [address] ENCOUNTER          ATTENDING PHYSICIAN NOTE       Date of evaluation: 5/29/2019    ADDENDUM:      Care of this patient was assumed from Dr. Galina Alvarenga. The patient was seen for Fever; Fatigue; and Urinary Frequency  . The patient's initial evaluation and plan have been discussed with the prior provider who initially evaluated the patient. Nursing Notes, Past Medical Hx, Past Surgical Hx, Social Hx, Allergies, and Family Hx were all reviewed. Diagnostic Results       RADIOLOGY:  XR CHEST PORTABLE   Final Result      Clear lungs. Normal cardiomediastinal silhouette status post sternotomy.           LABS:   Results for orders placed or performed during the hospital encounter of 05/29/19   CBC auto differential   Result Value Ref Range    WBC 6.2 4.0 - 11.0 K/uL    RBC 4.51 4.00 - 5.20 M/uL    Hemoglobin 14.0 12.0 - 16.0 g/dL    Hematocrit 41.3 36.0 - 48.0 %    MCV 91.6 80.0 - 100.0 fL    MCH 31.0 26.0 - 34.0 pg    MCHC 33.9 31.0 - 36.0 g/dL    RDW 14.1 12.4 - 15.4 %    Platelets 517 (L) 902 - 450 K/uL    MPV 9.6 5.0 - 10.5 fL    Neutrophils % 83.9 %    Lymphocytes % 6.5 %    Monocytes % 6.8 %    Eosinophils % 2.5 %    Basophils % 0.3 %    Neutrophils # 5.2 1.7 - 7.7 K/uL    Lymphocytes # 0.4 (L) 1.0 - 5.1 K/uL    Monocytes # 0.4 0.0 - 1.3 K/uL    Eosinophils # 0.2 0.0 - 0.6 K/uL    Basophils # 0.0 0.0 - 0.2 K/uL   Comprehensive Metabolic Panel w/ Reflex to MG   Result Value Ref Range    Sodium 136 136 - 145 mmol/L    Potassium reflex Magnesium 4.0 3.5 - 5.1 mmol/L    Chloride 103 99 - 110 mmol/L    CO2 23 21 - 32 mmol/L    Anion Gap 10 3 - 16    Glucose 121 (H) 70 - 99 mg/dL    BUN 8 7 - 20 mg/dL    CREATININE <0.5 (L) 0.6 - 1.2 mg/dL    GFR Non-African American >60 >60    GFR African American >60 >60    Calcium 9.4 8.3 - 10.6 mg/dL    Total Protein 6.9 6.4 - 8.2 g/dL    Alb 3.9 3.4 - 5.0 g/dL    Albumin/Globulin Ratio 1.3 1.1 - 2.2    Total Bilirubin 1.2 (H) 0.0 - 1.0 mg/dL    Alkaline Phosphatase 83 40 - 129 U/L    ALT 36 10 - 40 U/L    AST 65 (H) 15 - 37 U/L    Globulin 3.0 g/dL   Urinalysis   Result Value Ref Range    Color, UA Yellow Straw/Yellow    Clarity, UA Clear Clear    Glucose, Ur Negative Negative mg/dL    Bilirubin Urine Negative Negative    Ketones, Urine Negative Negative mg/dL    Specific Gravity, UA 1.010 1.005 - 1.030    Blood, Urine Negative Negative    pH, UA 7.0 5.0 - 8.0    Protein, UA Negative Negative mg/dL    Urobilinogen, Urine 2.0 (A) <2.0 E.U./dL    Nitrite, Urine Negative Negative    Leukocyte Esterase, Urine Negative Negative    Microscopic Examination Not Indicated     Urine Type Voided    Lactic acid, plasma   Result Value Ref Range    Lactic Acid 2.1 (H) 0.4 - 2.0 mmol/L   Sedimentation Rate   Result Value Ref Range    Sed Rate 26 0 - 30 mm/Hr   C-reactive protein   Result Value Ref Range    CRP 8.1 (H) 0.0 - 5.1 mg/L   POCT Venous   Result Value Ref Range    pH, Kimberly 7.527 (H) 7.350 - 7.450    pCO2, Kimberly 27.7 (L) 40.0 - 50.0 mm Hg    pO2, Kimberly 37 Not Established mm Hg    HCO3, Venous 23.0 23.0 - 29.0 mmol/L    Base Excess, Kimberly 0 -3 - 3    O2 Sat, Kimberly 78 Not Established %    TC02 (Calc), Kimberly 24 Not Established mmol/L    Lactate 1.23 0.40 - 2.00 mmol/L    Sample Type KIMBERLY     Performed on SEE BELOW    POCT Glucose   Result Value Ref Range    POC Glucose 104 (H) 70 - 99 mg/dl    Performed on ACCU-CHEK    EKG 12 Lead   Result Value Ref Range    Ventricular Rate 74 BPM    Atrial Rate 74 BPM    P-R Interval 166 ms    QRS Duration 130 ms    Q-T Interval 420 ms    QTc Calculation (Bazett) 466 ms    P Axis 57 degrees    R Axis -49 degrees    T Axis 63 degrees    Diagnosis       EKG performed in ER and to be interpreted by ER physician. Confirmed by MD, RONAN ([id number]),  RADHA Cadena (HOWARD) ([id number]) on 5/29/2019 2:39:56 PM       RECENT VITALS:  BP: 114/62, Temp: 100.8 °F (38.2 °C), Pulse: 101, Resp: 20, SpO2: 93 %     ED Course     The patient was given the

## 2019-05-29 NOTE — ED TRIAGE NOTES
Pt arrived to ED With c/o fever and frequent urination that started this am. Pt also states she just feels very tired

## 2019-05-30 ENCOUNTER — APPOINTMENT (OUTPATIENT)
Dept: GENERAL RADIOLOGY | Age: 84
DRG: 314 | End: 2019-05-30
Payer: MEDICARE

## 2019-05-30 LAB
ANION GAP SERPL CALCULATED.3IONS-SCNC: 11 MMOL/L (ref 3–16)
BASOPHILS ABSOLUTE: 0 K/UL (ref 0–0.2)
BASOPHILS RELATIVE PERCENT: 0.4 %
BUN BLDV-MCNC: 13 MG/DL (ref 7–20)
CALCIUM SERPL-MCNC: 8.4 MG/DL (ref 8.3–10.6)
CHLORIDE BLD-SCNC: 104 MMOL/L (ref 99–110)
CO2: 21 MMOL/L (ref 21–32)
CREAT SERPL-MCNC: 0.6 MG/DL (ref 0.6–1.2)
D DIMER: 279 NG/ML DDU (ref 0–229)
EOSINOPHILS ABSOLUTE: 0 K/UL (ref 0–0.6)
EOSINOPHILS RELATIVE PERCENT: 0.5 %
FIBRINOGEN: 291 MG/DL (ref 200–397)
GFR AFRICAN AMERICAN: >60
GFR NON-AFRICAN AMERICAN: >60
GLUCOSE BLD-MCNC: 107 MG/DL (ref 70–99)
GLUCOSE BLD-MCNC: 120 MG/DL (ref 70–99)
GLUCOSE BLD-MCNC: 184 MG/DL (ref 70–99)
GLUCOSE BLD-MCNC: 184 MG/DL (ref 70–99)
GLUCOSE BLD-MCNC: 190 MG/DL (ref 70–99)
HCT VFR BLD CALC: 38.2 % (ref 36–48)
HEMOGLOBIN: 13.2 G/DL (ref 12–16)
LACTATE DEHYDROGENASE: 356 U/L (ref 100–190)
LACTIC ACID: 1.4 MMOL/L (ref 0.4–2)
LV EF: 65 %
LVEF MODALITY: NORMAL
LYMPHOCYTES ABSOLUTE: 0.5 K/UL (ref 1–5.1)
LYMPHOCYTES RELATIVE PERCENT: 7.2 %
MCH RBC QN AUTO: 31.6 PG (ref 26–34)
MCHC RBC AUTO-ENTMCNC: 34.4 G/DL (ref 31–36)
MCV RBC AUTO: 92 FL (ref 80–100)
MONOCYTES ABSOLUTE: 0.5 K/UL (ref 0–1.3)
MONOCYTES RELATIVE PERCENT: 6.8 %
NEUTROPHILS ABSOLUTE: 5.6 K/UL (ref 1.7–7.7)
NEUTROPHILS RELATIVE PERCENT: 85.1 %
PDW BLD-RTO: 14.4 % (ref 12.4–15.4)
PERFORMED ON: ABNORMAL
PLATELET # BLD: 72 K/UL (ref 135–450)
PMV BLD AUTO: 9 FL (ref 5–10.5)
POTASSIUM REFLEX MAGNESIUM: 3.8 MMOL/L (ref 3.5–5.1)
RBC # BLD: 4.16 M/UL (ref 4–5.2)
REPORT: NORMAL
SODIUM BLD-SCNC: 136 MMOL/L (ref 136–145)
WBC # BLD: 6.6 K/UL (ref 4–11)

## 2019-05-30 PROCEDURE — 85384 FIBRINOGEN ACTIVITY: CPT

## 2019-05-30 PROCEDURE — 6370000000 HC RX 637 (ALT 250 FOR IP): Performed by: STUDENT IN AN ORGANIZED HEALTH CARE EDUCATION/TRAINING PROGRAM

## 2019-05-30 PROCEDURE — 85379 FIBRIN DEGRADATION QUANT: CPT

## 2019-05-30 PROCEDURE — 97165 OT EVAL LOW COMPLEX 30 MIN: CPT

## 2019-05-30 PROCEDURE — 85025 COMPLETE CBC W/AUTO DIFF WBC: CPT

## 2019-05-30 PROCEDURE — 97161 PT EVAL LOW COMPLEX 20 MIN: CPT

## 2019-05-30 PROCEDURE — 71046 X-RAY EXAM CHEST 2 VIEWS: CPT

## 2019-05-30 PROCEDURE — 99223 1ST HOSP IP/OBS HIGH 75: CPT | Performed by: INTERNAL MEDICINE

## 2019-05-30 PROCEDURE — 2580000003 HC RX 258: Performed by: STUDENT IN AN ORGANIZED HEALTH CARE EDUCATION/TRAINING PROGRAM

## 2019-05-30 PROCEDURE — 83605 ASSAY OF LACTIC ACID: CPT

## 2019-05-30 PROCEDURE — 6360000002 HC RX W HCPCS: Performed by: STUDENT IN AN ORGANIZED HEALTH CARE EDUCATION/TRAINING PROGRAM

## 2019-05-30 PROCEDURE — 83615 LACTATE (LD) (LDH) ENZYME: CPT

## 2019-05-30 PROCEDURE — 80048 BASIC METABOLIC PNL TOTAL CA: CPT

## 2019-05-30 PROCEDURE — 97535 SELF CARE MNGMENT TRAINING: CPT

## 2019-05-30 PROCEDURE — 93306 TTE W/DOPPLER COMPLETE: CPT

## 2019-05-30 PROCEDURE — 97116 GAIT TRAINING THERAPY: CPT

## 2019-05-30 PROCEDURE — 97530 THERAPEUTIC ACTIVITIES: CPT

## 2019-05-30 PROCEDURE — 1200000000 HC SEMI PRIVATE

## 2019-05-30 PROCEDURE — 36415 COLL VENOUS BLD VENIPUNCTURE: CPT

## 2019-05-30 RX ORDER — SODIUM CHLORIDE, SODIUM LACTATE, POTASSIUM CHLORIDE, CALCIUM CHLORIDE 600; 310; 30; 20 MG/100ML; MG/100ML; MG/100ML; MG/100ML
INJECTION, SOLUTION INTRAVENOUS CONTINUOUS
Status: DISCONTINUED | OUTPATIENT
Start: 2019-05-30 | End: 2019-05-31

## 2019-05-30 RX ORDER — 0.9 % SODIUM CHLORIDE 0.9 %
500 INTRAVENOUS SOLUTION INTRAVENOUS ONCE
Status: COMPLETED | OUTPATIENT
Start: 2019-05-30 | End: 2019-05-30

## 2019-05-30 RX ADMIN — INSULIN LISPRO 1 UNITS: 100 INJECTION, SOLUTION INTRAVENOUS; SUBCUTANEOUS at 09:15

## 2019-05-30 RX ADMIN — Medication 10 ML: at 20:57

## 2019-05-30 RX ADMIN — VANCOMYCIN HYDROCHLORIDE 1250 MG: 10 INJECTION, POWDER, LYOPHILIZED, FOR SOLUTION INTRAVENOUS at 05:09

## 2019-05-30 RX ADMIN — ASPIRIN 81 MG 81 MG: 81 TABLET ORAL at 09:16

## 2019-05-30 RX ADMIN — SODIUM CHLORIDE, SODIUM LACTATE, POTASSIUM CHLORIDE, AND CALCIUM CHLORIDE: 600; 310; 30; 20 INJECTION, SOLUTION INTRAVENOUS at 10:25

## 2019-05-30 RX ADMIN — INSULIN LISPRO 1 UNITS: 100 INJECTION, SOLUTION INTRAVENOUS; SUBCUTANEOUS at 21:11

## 2019-05-30 RX ADMIN — SODIUM CHLORIDE 500 ML: 9 INJECTION, SOLUTION INTRAVENOUS at 07:19

## 2019-05-30 RX ADMIN — SODIUM CHLORIDE, SODIUM LACTATE, POTASSIUM CHLORIDE, AND CALCIUM CHLORIDE: 600; 310; 30; 20 INJECTION, SOLUTION INTRAVENOUS at 21:19

## 2019-05-30 RX ADMIN — VANCOMYCIN HYDROCHLORIDE 1000 MG: 10 INJECTION, POWDER, LYOPHILIZED, FOR SOLUTION INTRAVENOUS at 18:39

## 2019-05-30 RX ADMIN — FAMOTIDINE 20 MG: 20 TABLET ORAL at 20:57

## 2019-05-30 RX ADMIN — ENOXAPARIN SODIUM 40 MG: 40 INJECTION SUBCUTANEOUS at 09:16

## 2019-05-30 RX ADMIN — PRAVASTATIN SODIUM 40 MG: 40 TABLET ORAL at 09:16

## 2019-05-30 RX ADMIN — FAMOTIDINE 20 MG: 20 TABLET ORAL at 09:16

## 2019-05-30 RX ADMIN — CEFEPIME HYDROCHLORIDE 2 G: 2 INJECTION, POWDER, FOR SOLUTION INTRAVENOUS at 06:43

## 2019-05-30 RX ADMIN — CEFEPIME HYDROCHLORIDE 2 G: 2 INJECTION, POWDER, FOR SOLUTION INTRAVENOUS at 20:36

## 2019-05-30 RX ADMIN — Medication 10 ML: at 09:00

## 2019-05-30 ASSESSMENT — PAIN SCALES - GENERAL
PAINLEVEL_OUTOF10: 0

## 2019-05-30 NOTE — PROGRESS NOTES
Internal Medicine PGY- 1 Resident Progress Note        PCP: Mena Dance, MD    Date of Admission: 5/29/2019    Chief Complaint: chills     Subjective: Patient was seen this morning at bedside with daughter present. Alert  And orientated times four. Feeling tired. Otherwise no abdominal pain, no dysuria. No chest pain, cough, runny nose, shortness of breath or signs of a respiratory infection. Blood cultures are positive for streptococcus. Afebrile this morning. Blood pressures are stable. Medications:  Reviewed    Infusion Medications    lactated ringers 125 mL/hr at 05/30/19 1025    dextrose       Scheduled Medications    vancomycin  1,000 mg Intravenous Q12H    [Held by provider] amLODIPine  2.5 mg Oral Daily    aspirin  81 mg Oral Daily    [Held by provider] metoprolol tartrate  12.5 mg Oral Daily    pravastatin  40 mg Oral Daily    sodium chloride flush  10 mL Intravenous 2 times per day    enoxaparin  40 mg Subcutaneous Daily    famotidine  20 mg Oral BID    insulin lispro  0-6 Units Subcutaneous TID WC    insulin lispro  0-3 Units Subcutaneous Nightly    cefepime  2 g Intravenous Q12H     PRN Meds: sodium chloride flush, magnesium hydroxide, ondansetron, acetaminophen, glucose, dextrose, glucagon (rDNA), dextrose, perflutren lipid microspheres      Intake/Output Summary (Last 24 hours) at 5/30/2019 1235  Last data filed at 5/30/2019 1036  Gross per 24 hour   Intake 550 ml   Output 300 ml   Net 250 ml       Physical Exam Performed:    BP (!) 93/48   Pulse 62   Temp 98.3 °F (36.8 °C) (Oral)   Resp 18   Ht 5' 4\" (1.626 m)   Wt 172 lb (78 kg)   SpO2 93%   BMI 29.52 kg/m²     General appearance: No apparent distress, appears stated age and cooperative. Tired. Ill appearing  HEENT: Pupils equal, round, and reactive to light. Conjunctivae/corneas clear. Neck: Supple, with full range of motion. No jugular venous distention. Trachea midline. Respiratory:  Normal respiratory effort. Melody Elena MD    I will discuss the patient with the senior resident and MD Monica Tubbs MD  Internal Medicine Resident PGY- 1

## 2019-05-30 NOTE — PROGRESS NOTES
Pt has remained afebrile so far during this shift. Pt received 500 ml bolus over 1 hour for low BP, with minimal results. Pt tolerating diet. Denies any nausea, vomiting or pain. Currently sitting up to chair. Fall precautions in place. Call light in reach. Will continue to monitor.  Electronically signed by Joleen Joshi RN on 5/30/2019 at 11:10 AM

## 2019-05-30 NOTE — CONSULTS
Clinical Pharmacy Consult Note    Admit date: 5/29/2019    Subjective/Objective:  81 yo female with PMHx of HTN, HLD, DM MI, aortic valve replacement (3/61), diastolic dysfxn is admitted with Fever of unknown origin. Pharmacy is consulted to dose Vancomycin per Dr. Rajiv Bhatti    Pertinent Medications:  Cefepime 2 gm every 12 hours - day #2  Vancomycin 1500 mg IVx1    PERTINENT LABS:  Recent Labs     05/29/19  1346      K 4.0      CO2 23   BUN 8   CREATININE <0.5*       CrCl cannot be calculated (This lab value cannot be used to calculate CrCl because it is not a number: <0.5). CrCl ~85.9mL/min using Scr of 0.5    Recent Labs     05/29/19  1346   WBC 6.2   HGB 14.0   HCT 41.3   MCV 91.6   *       Height:  5' 4\" (162.6 cm)  Weight: 172 lb (78 kg)    Micro: Blood and Urine cx are pending    Assessment/Plan: 1. Vancomycin  · Per ED consult, pt received vancomycin 1500 mg IV X1. Will continue therapy with vancomycin 1250 mg IV every 12 hours (~ 16 mg/kg)  · Clinical pharmacist will follow-up in AM.  · Renal function will be monitored closely and dosing will be adjusted as appropriate. Please call with any questions. Thank you for consulting pharmacy!   Josselyn Vargas Rph    5/30/2019 3:15 AM

## 2019-05-30 NOTE — PROGRESS NOTES
Pt had a Temp of 103 at the beginning of shift, Tylenol was given and temp was rechecked Q1 until pt is afebrile - see Epic. Pt had soft BP, MD aware. Call light properly used to call for help. Pt feels weak to ambulate to void in the bathroom, bed pan was used. Pt ambulated to the bathroom this morning, SBA x1. Voided and had x1 BM. Tolerating PO and abx infusing. Temp of 98.5 this morning.      BP 96/60   Pulse 62   Temp 98.5 °F (36.9 °C) (Oral)   Resp 16   Ht 5' 4\" (1.626 m)   Wt 172 lb (78 kg)   SpO2 93%   BMI 29.52 kg/m²

## 2019-05-30 NOTE — PROGRESS NOTES
Physical Therapy    Facility/Department: [facility] SURGERY  Initial Assessment/discharge note    NAME: Cherelle Diane  : 1935  MRN: [id number]    Date of Service: 2019    Discharge Recommendations:Rosalinda Hernandez scored a 21/24 on the AM-PAC short mobility form. Current research shows that an AM-PAC score of 18 or greater is typically associated with a discharge to the patient's home setting. PT Equipment Recommendations  Equipment Needed: No    Assessment   Assessment: 79 yo admitted 19 for one day fever of unknown origin. Pt reports she is normally independent at home and lives alone. Pt plans to return home at discharge. Anticipate pt will continue to progress as fever is controlled. If home, recommend 24 hour supervision intitially for safety. No new DME needs or acute PT needs. Will sign off for acute PT. Recommend nursing encourage increased OOB and ambulation with SBA PRN and often. RN aware. Treatment Diagnosis: mobility impairment due to fever  Decision Making: Low Complexity  Patient Education: Pt educated on PT role, need to call for assist to get up, home dispo and she verbalized understanding. REQUIRES PT FOLLOW UP: No  Activity Tolerance  Activity Tolerance: Patient Tolerated treatment well;Patient limited by endurance  Activity Tolerance: Pt demo fair endurance this am.        Patient Diagnosis(es): The encounter diagnosis was Fever, unspecified fever cause.      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, Aortic stenosis, Benign neoplasm of colon, Cancer ([address] Carcinoma in situ of cervix uteri, Coronary atherosclerosis of unspecified type of vessel, native or graft, Essential hypertension, benign, History of kidney stones, Internal hemorrhoids without mention of complication, MI, old, Other and unspecified hyperlipidemia, Palpitations, Peptic ulcer, unspecified site, unspecified as acute or chronic, without mention of hemorrhage, perforation, or obstruction, Sebaceous cyst of breast, Trigger finger, 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 has a past surgical history that includes Colonoscopy (03/30/2011); back surgery (1977); Cervix removal (1964); Colonoscopy (02/23/2009); Cataract removal (5/2012); Colonoscopy (5/8/2013 ); Cystocopy (73229114); Cardiac catheterization (6/6/2013 ); other surgical history (6/27/2013); Aortic valve replacement (7/12/2013 ); Colonoscopy (5/4/2015, 3/7/2016); Upper gastrointestinal endoscopy (3/7/2016); Upper gastrointestinal endoscopy (N/A, 1/28/2019); Colonoscopy (1/28/2019); Colonoscopy (1/28/2019); and Colonoscopy (1/28/2019). Restrictions  Position Activity Restriction  Other position/activity restrictions: up with assist     Vision/Hearing  Vision: Impaired  Vision Exceptions: Wears glasses at all times  Hearing: Exceptions to [facility]  Hearing Exceptions: Hard of hearing/hearing concerns       Subjective  General  Chart Reviewed: Yes  Additional Pertinent Hx: 79 yo admitted 5/29/19 for fever of unknown origin. CXR negative; echo pending. Pmhx:  HTN, DM, AVR, colon polyps, uterine CA. Family / Caregiver Present: Yes(daughter)  Diagnosis: fever of unknown origin  Follows Commands: Within Functional Limits  Subjective  Subjective: Pt found supine in bed and agreeable to PT. \" I'm feeling a little better today.  \"  Pain Screening  Patient Currently in Pain: Denies         Orientation  Orientation  Overall Orientation Status: Within Functional Limits     Social/Functional History  Social/Functional History  Lives With: Alone  Type of Home: House  Home Layout: One level, Laundry in basement  Home Access: (one step to enter-no rail)  Bathroom Shower/Tub: Tub/Shower unit(normally stands to shower)  Bathroom Toilet: Standard(sink nearby)  Bathroom Equipment: Shower chair, Grab bars in [address] [place]: [address] [facility] walker(not using DME PTA; has 3-in-1)  ADL Assistance: Independent  Homemaking Assistance: Independent  Ambulation Assistance: Independent  Transfer Assistance: Independent  Active : Yes  Occupation: Retired         Objective          AROM RLE (degrees)  RLE AROM: WNL  AROM LLE (degrees)  LLE AROM : WNL     Strength RLE  Strength RLE: WFL  Strength LLE  Strength LLE: WFL        Bed mobility  Supine to Sit: Independent  Sit to Supine: Independent  Scooting: Independent     Transfers  Sit to Stand: Supervision(x3 trials)  Stand to sit: Supervision(x3 trials)     Ambulation  Device: No Device  Assistance: Contact guard assistance(progressing to SBA)  Quality of Gait: slow oswaldo initially with no overt LOB noted but pt demo several stagger steps but able to catch self  Distance: 20 ft, 50 ft  Comments: Pt fatigued quickly and sp02 98% on RA.   Stairs/Curb  Stairs?: (up/down simulated curb step with rail supervision)              Plan   Safety Devices  Type of devices: Nurse notified, Call light within reach, Chair alarm in place, Left in chair(dtr in room)             AM-PAC Score  AM-PAC Inpatient Mobility Raw Score : 21  AM-PAC Inpatient T-Scale Score : 50.25  Mobility Inpatient CMS 0-100% Score: 28.97  Mobility Inpatient CMS G-Code Modifier : CJ     Therapy Time   Individual Concurrent Group Co-treatment   Time In 0947         Time Out 1025         Minutes 38           Timed Code Treatment Minutes: 28      Total Treatment Minutes:  [address] Roly Woodruff, PT

## 2019-05-30 NOTE — PROGRESS NOTES
Occupational Therapy   Occupational Therapy Initial Assessment, Treatment, and Discharge Note  Date: 2019   Patient Name: Swapna Kim  MRN: [id number]     : 1935    Date of Service: 2019    Discharge Recommendations:  Swapna Kim scored a 24/24 on the -[facility] ADL Inpatient form. At this time, no further OT is recommended upon discharge due to independence in ADLs and functional mobility. Recommend patient returns to prior setting with prior services. Assessment   Assessment: Admitted  from home. Pt demo is independent w/ ADLs and functional mobility. Pt plans for home at d/c. Will sign off D/T no acute OT needs, No DME needs. Treatment Diagnosis: fever  Prognosis: Good  Decision Making: Low Complexity  No Skilled OT: Independent with ADL's;Independent with functional mobility; Safe to return home  REQUIRES OT FOLLOW UP: No  Activity Tolerance  Activity Tolerance: Patient Tolerated treatment well  Activity Tolerance: BP EOB at start of session was 97/60. Symptoms monitored throughout tx w/ no report of dizziness. Slight DUFFY after 5 min walking so pt sat back in bed. Safety Devices  Safety Devices in place: Yes  Type of devices: Bed alarm in place; Left in bed;Call light within reach;Nurse notified         Patient Diagnosis(es): The encounter diagnosis was Fever, unspecified fever cause.      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, Aortic stenosis, Benign neoplasm of colon, Cancer ([facility] [address] Carcinoma in situ of cervix uteri, Coronary atherosclerosis of unspecified type of vessel, native or graft, Essential hypertension, benign, History of kidney stones, Internal hemorrhoids without mention of complication, MI, old, Other and unspecified hyperlipidemia, Palpitations, Peptic ulcer, unspecified site, unspecified as acute or chronic, without mention of hemorrhage, perforation, or obstruction, Independent  Active : Yes  Occupation: Retired     Objective Treatment included functional transfer training, ADL's and pt. education. Vision: Impaired  Vision Exceptions: Wears glasses at all times  Hearing: Exceptions to [facility]  Hearing Exceptions: Hard of hearing/hearing concerns    Orientation  Overall Orientation Status: Within Functional Limits     Balance  Sitting Balance: Independent  Standing Balance: Independent  Standing Balance  Time: 2 min x 1; 6 min x 1  Activity: functional activity in bathroom; functional mobility  Functional Mobility  Functional - Mobility Device: No device  Activity: To/from bathroom; Other  Assist Level: Independent  Toilet Transfers  Toilet - Technique: Ambulating  Equipment Used: Standard toilet  Toilet Transfer: Independent  ADL  LE Dressing: Independent(sitting EOB)  Toileting: Independent  Tone RUE  RUE Tone: Normotonic  Tone LUE  LUE Tone: Normotonic  Coordination  Movements Are Fluid And Coordinated: Yes     Bed mobility  Supine to Sit: Independent  Sit to Supine: Independent  Transfers  Sit to stand: Independent  Stand to sit: Independent(from EOB)     Cognition  Overall Cognitive Status: WFL    LUE AROM (degrees)  LUE AROM : WFL  RUE AROM (degrees)  RUE AROM : WFL  LUE Strength  Gross LUE Strength: WFL  L Hand Grasp: 4/5  L Hand Release: 4/5  RUE Strength  Gross RUE Strength: WFL  R Hand Grasp: 4/5  R Hand Release: 4/5    Plan    Pt is d/c from acute OT.     AM-PAC Score  AM-[facility] Inpatient Daily Activity Raw Score: 24  AM-PAC Inpatient ADL T-Scale Score : 57.54  ADL Inpatient CMS 0-100% Score: 0  ADL Inpatient CMS G-Code Modifier : [address]    Therapy Time   Individual Concurrent Group Co-treatment   Time In 1350         Time Out 1414         Minutes 24           Timed Code Treatment Minutes:  9 min    Total Treatment Minutes:  24 min    Roberto Spare OT/S    Therapist was present, directed the patients care, made skilled judgement and was responsible for assessment and treatment of the patient.      Geoffrey Erp OTR/L  2627

## 2019-05-30 NOTE — CONSULTS
Clinical Pharmacy Progress Note    Admit date: 5/29/2019     Subjective/Objective:  Pt is a 80yof with PMHx that includes DM 2, HTN, PUD, HFpEF, and aortic stenosis s/p AVR who is admitted with fever of unknown origin. Interval update:  Blood cultures growing Strep sp. Pharmacy is consulted to dose Vancomycin per Dr. Alexus Chase    Current antibiotics:  Cefepime 2g IV q12h - day #2  Vancomycin - Pharmacy to dose - day #2   1.5g IV x1 5/29 17:00   1.25g IV x1 5/30 05:00   1g IV q12h (5/30 - current)      Recent Labs     05/29/19  1346 05/30/19  0708    136   K 4.0 3.8    104   CO2 23 21   BUN 8 13   CREATININE <0.5* 0.6   GLUCOSE 121* 190*       Estimated Creatinine Clearance: 71 mL/min (based on SCr of 0.6 mg/dL). Lab Results   Component Value Date    WBC 6.6 05/30/2019    HGB 13.2 05/30/2019    HCT 38.2 05/30/2019    MCV 92.0 05/30/2019    PLT 72 (L) 05/30/2019       Lab Results   Component Value Date    PROTIME 13.4 (H) 05/09/2019    INR 1.18 (H) 05/09/2019       Height:  5' 4\" (162.6 cm)  Weight:  172 lb (78 kg)    Vancomycin Levels:  Trough  5/31 @ 06:00 = pending     Culture results:  Blood (5/29) = pending  Urine (5/29) = No growth to date    Prophylaxis:  VTE:  enoxaparin  GI:  famotidine    Vaccination screening:  Pneumonia:  Up to date  Influenza:  Out of season    Assessment/Plan:  1)  Fever of unknown origin:  Cefepime + Vancomycin - day #2  · Cefepime -  · Current dose remains appropriate based on current renal function. · Will continue to monitor and adjust as needed per [facility] Renal Dose Adjustment Policy. · Vancomycin - Pharmacy to dose  · Started on 1.25g IV q12h overnight - dose may be slightly aggressive. Will decrease slightly to 1g IV q12h. · Trough has been ordered with dose due 5/31 06:00. Desired trough ~15. · Clinical condition will be monitored closely, and levels will be ordered & dose adjustments made as appropriate.     Please call with questions--  Thanks--  Bernarda Chandra Lili Borrego, PharmD, [address], [facility] or [phone] (wireless)  5/30/2019 3:02 PM

## 2019-05-30 NOTE — PROGRESS NOTES
Pt returned from [address] [place] [address].  Electronically signed by Kirsten Isbell RN on 5/30/2019 at 3:28 PM

## 2019-05-30 NOTE — PROGRESS NOTES
Report called to [facility] on [facility].  Electronically signed by Herlinda Diaz RN on 5/30/2019 at 5:27 PM

## 2019-05-30 NOTE — PLAN OF CARE
Problem: Falls - Risk of:  Goal: Will remain free from falls  Description  Will remain free from falls. Pt alert and oriented, uses call light appropriately. Pt wearing non-skid socks, bed rails up, bed locked and in its lowest position. Hourly rounding conducted. Bedside table within reach.    Outcome: Ongoing

## 2019-05-30 NOTE — PROGRESS NOTES
Clinical Pharmacy Progress Note    Admit date: 5/29/2019     Subjective/Objective:  Pt is a 80yof with PMHx that includes DM 2, HTN, PUD, HFpEF, and aortic stenosis s/p AVR who is admitted with fever of unknown origin. Pharmacy is consulted to dose Vancomycin per Dr. Eder Upton    Current antibiotics:  Cefepime 2g IV q12h - day #2  Vancomycin - Pharmacy to dose - day #2   1.5g IV x1 5/29 17:00   1.25g IV x1 5/30 05:00   1g IV q12h (5/30 - current)      Recent Labs     05/29/19  1346 05/30/19  0708    136   K 4.0 3.8    104   CO2 23 21   BUN 8 13   CREATININE <0.5* 0.6   GLUCOSE 121* 190*       Estimated Creatinine Clearance: 71 mL/min (based on SCr of 0.6 mg/dL). Lab Results   Component Value Date    WBC 6.6 05/30/2019    HGB 13.2 05/30/2019    HCT 38.2 05/30/2019    MCV 92.0 05/30/2019    PLT 72 (L) 05/30/2019       Lab Results   Component Value Date    PROTIME 13.4 (H) 05/09/2019    INR 1.18 (H) 05/09/2019       Height:  5' 4\" (162.6 cm)  Weight:  172 lb (78 kg)    Vancomycin Levels:  Trough  5/31 @ 06:00 = pending     Culture results:  Blood (5/29) = pending  Urine (5/29) = No growth to date    Prophylaxis:  VTE:  enoxaparin  GI:  famotidine    Vaccination screening:  Pneumonia:  Up to date  Influenza:  Out of season    Assessment/Plan:  1)  Fever of unknown origin:  Cefepime + Vancomycin - day #2  · Cefepime -  · Current dose remains appropriate based on current renal function. · Will continue to monitor and adjust as needed per [facility] Renal Dose Adjustment Policy. · Vancomycin - Pharmacy to dose  · Started on 1.25g IV q12h overnight - dose may be slightly aggressive. Will decrease slightly to 1g IV q12h. · Trough has been ordered with dose due 5/31 06:00. Desired trough ~15. · Clinical condition will be monitored closely, and levels will be ordered & dose adjustments made as appropriate.     Please call with questions--  Thanks--  Chantal Gordon, PharmD, [address], [address] [facility] or [phone]

## 2019-05-30 NOTE — PROGRESS NOTES
This RN received call from micro, pt has positive blood cx, streptococcus x 4 bottles. Medical resident notified via perfect serve.  Electronically signed by Valeri Mendez RN on 5/30/2019 at 11:54 AM

## 2019-05-31 ENCOUNTER — APPOINTMENT (OUTPATIENT)
Dept: NON INVASIVE DIAGNOSTICS | Age: 84
DRG: 314 | End: 2019-05-31
Payer: MEDICARE

## 2019-05-31 PROBLEM — Z95.3 HISTORY OF AORTIC VALVE REPLACEMENT WITH BIOPROSTHETIC VALVE: Status: ACTIVE | Noted: 2019-05-31

## 2019-05-31 PROBLEM — B95.5 STREPTOCOCCAL BACTEREMIA: Status: ACTIVE | Noted: 2019-05-31

## 2019-05-31 PROBLEM — R78.81 STREPTOCOCCAL BACTEREMIA: Status: ACTIVE | Noted: 2019-05-31

## 2019-05-31 LAB
ANION GAP SERPL CALCULATED.3IONS-SCNC: 9 MMOL/L (ref 3–16)
BASOPHILS ABSOLUTE: 0 K/UL (ref 0–0.2)
BASOPHILS RELATIVE PERCENT: 0.6 %
BLOOD SMEAR REVIEW: NORMAL
BUN BLDV-MCNC: 9 MG/DL (ref 7–20)
CALCIUM SERPL-MCNC: 8.3 MG/DL (ref 8.3–10.6)
CHLORIDE BLD-SCNC: 107 MMOL/L (ref 99–110)
CO2: 21 MMOL/L (ref 21–32)
CREAT SERPL-MCNC: <0.5 MG/DL (ref 0.6–1.2)
EOSINOPHILS ABSOLUTE: 0.2 K/UL (ref 0–0.6)
EOSINOPHILS RELATIVE PERCENT: 3.6 %
GFR AFRICAN AMERICAN: >60
GFR NON-AFRICAN AMERICAN: >60
GLUCOSE BLD-MCNC: 103 MG/DL (ref 70–99)
GLUCOSE BLD-MCNC: 127 MG/DL (ref 70–99)
GLUCOSE BLD-MCNC: 149 MG/DL (ref 70–99)
GLUCOSE BLD-MCNC: 152 MG/DL (ref 70–99)
GLUCOSE BLD-MCNC: 171 MG/DL (ref 70–99)
HCT VFR BLD CALC: 35.7 % (ref 36–48)
HEMOGLOBIN: 12.1 G/DL (ref 12–16)
LV EF: 58 %
LVEF MODALITY: NORMAL
LYMPHOCYTES ABSOLUTE: 0.9 K/UL (ref 1–5.1)
LYMPHOCYTES RELATIVE PERCENT: 20.7 %
MCH RBC QN AUTO: 31.7 PG (ref 26–34)
MCHC RBC AUTO-ENTMCNC: 33.9 G/DL (ref 31–36)
MCV RBC AUTO: 93.6 FL (ref 80–100)
MONOCYTES ABSOLUTE: 0.6 K/UL (ref 0–1.3)
MONOCYTES RELATIVE PERCENT: 13.1 %
NEUTROPHILS ABSOLUTE: 2.8 K/UL (ref 1.7–7.7)
NEUTROPHILS RELATIVE PERCENT: 62 %
PDW BLD-RTO: 14.2 % (ref 12.4–15.4)
PERFORMED ON: ABNORMAL
PLATELET # BLD: 60 K/UL (ref 135–450)
PMV BLD AUTO: 9.1 FL (ref 5–10.5)
POTASSIUM REFLEX MAGNESIUM: 3.7 MMOL/L (ref 3.5–5.1)
RBC # BLD: 3.81 M/UL (ref 4–5.2)
RBC # BLD: NORMAL 10*6/UL
SLIDE REVIEW: ABNORMAL
SODIUM BLD-SCNC: 137 MMOL/L (ref 136–145)
URINE CULTURE, ROUTINE: NORMAL
VANCOMYCIN TROUGH: 51.9 UG/ML (ref 10–20)
WBC # BLD: 4.6 K/UL (ref 4–11)

## 2019-05-31 PROCEDURE — 93325 DOPPLER ECHO COLOR FLOW MAPG: CPT | Performed by: INTERNAL MEDICINE

## 2019-05-31 PROCEDURE — 6360000002 HC RX W HCPCS: Performed by: INTERNAL MEDICINE

## 2019-05-31 PROCEDURE — 6360000002 HC RX W HCPCS: Performed by: STUDENT IN AN ORGANIZED HEALTH CARE EDUCATION/TRAINING PROGRAM

## 2019-05-31 PROCEDURE — 85025 COMPLETE CBC W/AUTO DIFF WBC: CPT

## 2019-05-31 PROCEDURE — 36415 COLL VENOUS BLD VENIPUNCTURE: CPT

## 2019-05-31 PROCEDURE — 99232 SBSQ HOSP IP/OBS MODERATE 35: CPT | Performed by: INTERNAL MEDICINE

## 2019-05-31 PROCEDURE — 6370000000 HC RX 637 (ALT 250 FOR IP): Performed by: STUDENT IN AN ORGANIZED HEALTH CARE EDUCATION/TRAINING PROGRAM

## 2019-05-31 PROCEDURE — 93320 DOPPLER ECHO COMPLETE: CPT | Performed by: INTERNAL MEDICINE

## 2019-05-31 PROCEDURE — 93312 ECHO TRANSESOPHAGEAL: CPT | Performed by: INTERNAL MEDICINE

## 2019-05-31 PROCEDURE — 1200000000 HC SEMI PRIVATE

## 2019-05-31 PROCEDURE — 80048 BASIC METABOLIC PNL TOTAL CA: CPT

## 2019-05-31 PROCEDURE — 99153 MOD SED SAME PHYS/QHP EA: CPT | Performed by: INTERNAL MEDICINE

## 2019-05-31 PROCEDURE — 80202 ASSAY OF VANCOMYCIN: CPT

## 2019-05-31 PROCEDURE — 86022 PLATELET ANTIBODIES: CPT

## 2019-05-31 PROCEDURE — 2580000003 HC RX 258: Performed by: STUDENT IN AN ORGANIZED HEALTH CARE EDUCATION/TRAINING PROGRAM

## 2019-05-31 PROCEDURE — 99223 1ST HOSP IP/OBS HIGH 75: CPT | Performed by: INTERNAL MEDICINE

## 2019-05-31 PROCEDURE — 2580000003 HC RX 258: Performed by: INTERNAL MEDICINE

## 2019-05-31 PROCEDURE — 99152 MOD SED SAME PHYS/QHP 5/>YRS: CPT | Performed by: INTERNAL MEDICINE

## 2019-05-31 RX ORDER — ACETAMINOPHEN 325 MG/1
650 TABLET ORAL EVERY 4 HOURS PRN
Status: DISCONTINUED | OUTPATIENT
Start: 2019-05-31 | End: 2019-06-03 | Stop reason: HOSPADM

## 2019-05-31 RX ORDER — SODIUM CHLORIDE, SODIUM LACTATE, POTASSIUM CHLORIDE, CALCIUM CHLORIDE 600; 310; 30; 20 MG/100ML; MG/100ML; MG/100ML; MG/100ML
INJECTION, SOLUTION INTRAVENOUS CONTINUOUS
Status: DISCONTINUED | OUTPATIENT
Start: 2019-05-31 | End: 2019-06-02

## 2019-05-31 RX ADMIN — ENOXAPARIN SODIUM 40 MG: 40 INJECTION SUBCUTANEOUS at 15:44

## 2019-05-31 RX ADMIN — SODIUM CHLORIDE, SODIUM LACTATE, POTASSIUM CHLORIDE, AND CALCIUM CHLORIDE: 600; 310; 30; 20 INJECTION, SOLUTION INTRAVENOUS at 15:45

## 2019-05-31 RX ADMIN — CEFTRIAXONE SODIUM 2 G: 2 INJECTION, POWDER, FOR SOLUTION INTRAMUSCULAR; INTRAVENOUS at 17:39

## 2019-05-31 RX ADMIN — INSULIN LISPRO 1 UNITS: 100 INJECTION, SOLUTION INTRAVENOUS; SUBCUTANEOUS at 22:52

## 2019-05-31 RX ADMIN — FAMOTIDINE 20 MG: 20 TABLET ORAL at 22:51

## 2019-05-31 RX ADMIN — CEFEPIME HYDROCHLORIDE 2 G: 2 INJECTION, POWDER, FOR SOLUTION INTRAVENOUS at 08:00

## 2019-05-31 RX ADMIN — Medication 10 ML: at 08:42

## 2019-05-31 RX ADMIN — VANCOMYCIN HYDROCHLORIDE 1000 MG: 10 INJECTION, POWDER, LYOPHILIZED, FOR SOLUTION INTRAVENOUS at 05:36

## 2019-05-31 ASSESSMENT — PAIN SCALES - GENERAL
PAINLEVEL_OUTOF10: 0
PAINLEVEL_OUTOF10: 0

## 2019-05-31 NOTE — DISCHARGE INSTR - COC
Continuity of Care Form    Patient Name: Jazmine Guerrero   :  1935  MRN:  [id number]    Admit date:  2019  Discharge date:  ***    Code Status Order: Limited   Advance Directives:   [address] [place] [address] Documentation     Date/Time Healthcare Directive Type of Healthcare Directive Copy in [facility] [address] Agent's Name Healthcare Agent's Phone Number    19 1838  Yes, patient has an advance directive for healthcare treatment  Living will  No, copy requested from family  --  --  --          Admitting Physician:  Ulisses Stewart MD  PCP: Ulisses Stewart MD    Discharging Nurse: [facility] Unit/Room#: 5535/6509-25  Discharging Unit Phone Number: ***    Emergency Contact:   Extended Emergency Contact Information  Primary Emergency Contact: Byron Mustafa [address] Phone: [phone]  Mobile Phone: [phone]  Relation: Child  Secondary Emergency Contact: Kale Miller  Address: Jacki Cabezas [address] Phone: [phone]  Mobile Phone: [phone]  Relation: Child    Past Surgical History:  Past Surgical History:   Procedure Laterality Date    AORTIC VALVE REPLACEMENT  2013     Dr. Patrick Shoulder  2013     Dr. Chalo Delacruz - coronaries WNL , LVEF WNL    Leitha Carrier  2012    Dr. Gonzalez Marmolejo - bilateral    Whit Yimi    Dr. Florecita Zamorano    COLONOSCOPY  2011    DR. Mello  - Internal hemorrhoids, sigmoid diverticulosis, hyperplastic polyps. Repeat in five  years.  COLONOSCOPY  2009    nternal hemorrhoids, Diverticulosis, Colon polyps.   Repeat two years    COLONOSCOPY  2013     Dr. Douglas Morejon  - internal hemorrhoids , diverticulosis and polyps     COLONOSCOPY  2015, 3/7/2016    - hemorrhoids, diverticulosis, hyperplastic colon polyps, repeat in 1 year    COLONOSCOPY  2019    COLONOSCOPY Thrombocytopenia (HCC) D69.6    Dizziness R42    Near syncope R55    Fever R50.9       Isolation/Infection:   Isolation          No Isolation            Nurse Assessment:  Last Vital Signs: BP (!) 120/56   Pulse 62   Temp 98.1 °F (36.7 °C) (Oral)   Resp 18   Ht 5' 4\" (1.626 m)   Wt 172 lb (78 kg)   SpO2 96%   BMI 29.52 kg/m²     Last documented pain score (0-10 scale): Pain Level: 0  Last Weight:   Wt Readings from Last 1 Encounters:   05/29/19 172 lb (78 kg)     Mental Status:  {IP PT MENTAL STATUS:20030}    IV Access:  { DANILO IV ACCESS:615504378}    Nursing Mobility/ADLs:  Walking   {[facility] DME SRRM:743768499}  Transfer  {[facility] DME FXNF:312338230}  Bathing  {[facility] DME FRZC:832567865}  Dressing  {[facility] DME UENF:043582426}  Toileting  {[facility] DME PYIV:547709444}  Feeding  {[facility] DME TRUY:059810541}  Med Admin  {[facility] DME OYLU:126096148}  Med Delivery   { DANILO MED Delivery:182634402}    Wound Care Documentation and Therapy:        Elimination:  Continence:   · Bowel: {YES / SQ:98746}  · Bladder: {YES / DZ:38392}  Urinary Catheter: {Urinary Catheter:465525617}   Colostomy/Ileostomy/Ileal Conduit: {YES / KT:06255}       Date of Last BM: ***    Intake/Output Summary (Last 24 hours) at 5/31/2019 1310  Last data filed at 5/31/2019 0842  Gross per 24 hour   Intake 3610 ml   Output --   Net 3610 ml     I/O last 3 completed shifts: In: 7410 [P.O.:240;  I.V.:3610]  Out: 100 [Urine:100]    Safety Concerns:     [address] [employer] Safety Concerns:633749734}    Impairments/Disabilities:      [employer] Impairments/Disabilities:847791944}    Nutrition Therapy:  Current Nutrition Therapy:   [employer] Diet List:066196988}    Routes of Feeding: {[facility] DME Other Feedings:216419860}  Liquids: {Slp liquid thickness:99726}  Daily Fluid Restriction: {CHP DME Yes amt example:852738156}  Last Modified Barium Swallow with Video (Video Swallowing Test): {Done Not Done LTSK:120316913}    Treatments at the Time of Hospital Discharge:   Respiratory Treatments: ***  Oxygen Therapy:  {Therapy; copd oxygen:17769}  Ventilator:    {[facility] CC Vent MFAD:792809331}    Rehab Therapies: {THERAPEUTIC INTERVENTION:5782092163}  Weight Bearing Status/Restrictions: [address] CC Weight Bearin}  Other Medical Equipment (for information only, NOT a DME order):  {EQUIPMENT:813116290}  Other Treatments: ***    Patient's personal belongings (please select all that are sent with patient):  {CHP DME Belongings:979301492}    RN SIGNATURE:  {Esignature:558844338}    CASE MANAGEMENT/SOCIAL WORK SECTION    Inpatient Status Date: 19    Readmission Risk Assessment Score:  Readmission Risk              Risk of Unplanned Readmission:        11           Discharging to Facility/ Agency   · Name: [facility]  · Phone: [phone]  · Fax: [phone]    Infusion IV ABX   · Name: [facility]  · Phone: [phone]  · [phone]    / signature: Electronically signed by IVETT Mchugh, ANGELITAW on 19 at 1:16 PM    PHYSICIAN SECTION    Prognosis: Good    Condition at Discharge: Stable    Rehab Potential (if transferring to Rehab): Good    Recommended Labs or Other Treatments After Discharge:   Continue ceftriaxone 2g every 25 hours for 4 weeks until 2019  Follow up with PCP   - Check CBC w diff, CMP, ESR, CRP every Mon or Tue - FAX result to [phone]  - Call with antibiotic / infusion issues, [phone]  - No f/u in outpatient ID office necessary    Physician Certification: I certify the above information and transfer of Montana Stern  is necessary for the continuing treatment of the diagnosis listed and that she requires Home Care for less 30 days.      Update Admission H&P: No change in H&P    PHYSICIAN SIGNATURE:  Electronically signed by Memo Banegas MD on 6/3/19 at 3:29 PM

## 2019-05-31 NOTE — PROGRESS NOTES
turgor normal.  No rashes or lesions. Labs:   Recent Labs     05/29/19  1346 05/30/19  0708 05/31/19  0634   WBC 6.2 6.6 4.6   HGB 14.0 13.2 12.1   HCT 41.3 38.2 35.7*   * 72* 60*     Recent Labs     05/29/19  1346 05/30/19  0708 05/31/19  0634    136 137   K 4.0 3.8 3.7    104 107   CO2 23 21 21   BUN 8 13 9   CREATININE <0.5* 0.6 <0.5*   CALCIUM 9.4 8.4 8.3     Recent Labs     05/29/19  1346   AST 65*   ALT 36   BILITOT 1.2*   ALKPHOS 83     No results for input(s): INR in the last 72 hours. No results for input(s): Madalynn Grant in the last 72 hours. Urinalysis:      Lab Results   Component Value Date    NITRU Negative 05/29/2019    WBCUA 3-5 01/05/2019    BACTERIA 1+ 01/05/2019    RBCUA 0-2 01/05/2019    BLOODU Negative 05/29/2019    SPECGRAV 1.010 05/29/2019    GLUCOSEU Negative 05/29/2019       Radiology:  XR CHEST STANDARD (2 VW)   Final Result   1. No evidence of acute cardiopulmonary disease. XR CHEST PORTABLE   Final Result      Clear lungs. Normal cardiomediastinal silhouette status post sternotomy. Assessment/Plan:    Sepsis secondary to bacteremia   - presented with Fever, chills, Fatigue   - Blood cultures positive for Streptococcus unknown species   - Unknown Nidus of infection   - Transesophageal negative for vegetations   - Tmax 99.9. Vital signs stable, normal white count    - Continue cefepime   - ID consulted for Antibiotic guidance   - Continue Vancomycin, pharmacy to dose   - Lactated ringers at 150 ml /hr     Thrombocytopenia   - 4T score was zero   - Fibrinogen normal, D-dimer slightly elevated   - got HIT panel   - resume Lovenox as long as platelets greater than 50     Chronic stable CHFpEF Gr1DD   - Overall left ventricular systolic function appears hyperdynamic   with an ejection fraction visually estimated at >65%.    - BB as below     HTN  - amlodipine hold for hypotension  - lopressor 12.5  hold for hypotension      HLD:  - Pravastatin     T2DM:  - low dose sliding scale  - hypoglycemic protocol     DVT Prophylaxis: Lovenox   Diet: Diet NPO Effective Now  Code Status: Limited    PT/OT Eval Status: Pending     Baby Cockayne, MD    I will discuss the patient with the senior resident and MD Pavan Copeland MD  Internal Medicine Resident PGY- 1

## 2019-05-31 NOTE — PLAN OF CARE
Pt up ad shyanne. Pt's gait steady. Bed in lowest position, call light within reach.    Lina Carrington RN

## 2019-05-31 NOTE — CARE COORDINATION
Made referral to [address] [place] [address] for possible IV ABX. Checking benefits for home IV ABX.     Tatiana Wu, MSW, LSW    [phone]      Electronically signed by IVETT Loo, LSW on 5/31/2019 at 11:06 AM

## 2019-05-31 NOTE — DISCHARGE SUMMARY
switched to ceftriaxone by infectious disease. She will require IV treatment until 6/25 for a total of four weeks. CT abdomen was done to rule out cancer as a source of bacteremia which showed. 1. Nonspecific pericholecystic fluid. 2. A 3.1 cm left adrenal myelolipoma. 3. Calcified uterine fibroid. 4. Trace right pleural effusion. She will need follow up Ultrasound of her right upper quadrant. Disposition:  Home  W/  HHC  And  Infusion     Physical Exam Performed:     BP (!) 151/63   Pulse 67   Temp 97.6 °F (36.4 °C) (Oral)   Resp 18   Ht 5' 4\" (1.626 m)   Wt 172 lb (78 kg)   SpO2 95%   BMI 29.52 kg/m²       General appearance: No apparent distress, appears stated age and cooperative. Tired. Ill appearing  HEENT: Pupils equal, round, and reactive to light. Conjunctivae/corneas clear. Neck: Supple, with full range of motion. No jugular venous distention. Trachea midline. Respiratory:  Normal respiratory effort. Clear to auscultation, bilaterally without Rales/Wheezes/Rhonchi. Cardiovascular: Regular rate and rhythum, Mechanical valve sounds. Abdomen: Soft, non-tender, non-distended with normal bowel sounds. Musculoskeletal: No clubbing, cyanosis or edema bilaterally. Full range of motion without deformity. Skin: Skin color, texture, turgor normal.  No rashes or lesions. Labs: For convenience and continuity at follow-up the following most recent labs are provided:      CBC:    Lab Results   Component Value Date    WBC 4.8 06/03/2019    HGB 12.4 06/03/2019    HCT 36.5 06/03/2019    PLT 85 06/03/2019       Renal:    Lab Results   Component Value Date     06/03/2019    K 3.6 06/03/2019     06/03/2019    CO2 24 06/03/2019    BUN 6 06/03/2019    CREATININE <0.5 06/03/2019    CALCIUM 8.9 06/03/2019    PHOS 4.1 03/29/2013         Significant Diagnostic Studies    Radiology:   XR CHEST PORTABLE   Final Result      No active pulmonary disease. Minimal right pleural effusion. CT ABDOMEN PELVIS W IV CONTRAST Additional Contrast? None   Final Result      1. Nonspecific pericholecystic fluid. 2. A 3.1 cm left adrenal myelolipoma. 3. Calcified uterine fibroid. 4. Trace right pleural effusion. XR CHEST PORTABLE   Final Result      Mild right infrahilar opacities, atelectasis versus developing infiltrate. XR CHEST STANDARD (2 VW)   Final Result   1. No evidence of acute cardiopulmonary disease. XR CHEST PORTABLE   Final Result      Clear lungs. Normal cardiomediastinal silhouette status post sternotomy.       US ABDOMEN LIMITED    (Results Pending)          Consults:     IP CONSULT TO PRIMARY CARE PROVIDER  PHARMACY TO DOSE VANCOMYCIN  IP CONSULT TO INFECTIOUS DISEASES  PHARMACY TO DOSE VANCOMYCIN  IP CONSULT TO CARDIOLOGY  IP CONSULT TO HOME CARE NEEDS    Disposition:  Home      Condition at Discharge: Stable    Discharge Instructions/Follow-up:    Continue ceftriaxone for 4 weeks until 6/25/2019  Follow up with PCP   - Check CBC w diff, CMP, ESR, CRP every Mon or Tue - FAX result to [phone]  - Call with antibiotic / infusion issues, [phone]  - No f/u in outpatient ID office necessary        Code Status:  Limited     Activity: activity as tolerated    Diet: regular diet      Discharge Medications:     Current Discharge Medication List           Details   fluticasone (FLONASE) 50 MCG/ACT nasal spray 1 spray by Each Nostril route daily  Qty: 1 Bottle, Refills: 3              Details   pantoprazole (PROTONIX) 40 MG tablet Take 40 mg by mouth daily      pravastatin (PRAVACHOL) 40 MG tablet Take 1 tablet by mouth daily  Qty: 90 tablet, Refills: 1    Comments: Please use 90 day supply to replace any remaining refills of 30 day rx  Associated Diagnoses: Hypercholesteremia      metoprolol tartrate (LOPRESSOR) 25 MG tablet Take 0.5 tablets by mouth daily  Qty: 45 tablet, Refills: 0    Associated Diagnoses: Essential hypertension, benign      amLODIPine (NORVASC) 2.5 MG tablet Take 1 tablet by mouth daily  Qty: 30 tablet, Refills: 5      Multiple Vitamins-Minerals (ONE-A-DAY WOMENS 50 PLUS PO) Take 1 capsule by mouth daily      Liraglutide (VICTOZA) 18 MG/3ML SOPN SC injection Inject 0.6 mg into the skin daily DIAL AND INJECT SUBCUTANEOUSLY 0.6 MG DAILY  Qty: 18 pen, Refills: 2    Associated Diagnoses: Type 2 diabetes mellitus without complication, without long-term current use of insulin ([facility])      aspirin 81 MG tablet Take 81 mg by mouth daily. vitamin D (ERGOCALCIFEROL) 75000 units CAPS capsule Take 1 capsule by mouth once a week  Qty: 4 capsule, Refills: 5    Associated Diagnoses: Vitamin D deficiency      cetirizine (ZYRTEC) 10 MG tablet Take 10 mg by mouth daily as needed       ACCU-CHEK BERNABE PLUS strip TEST BLOOD SUGAR ONE TIME A DAY OR AS DIRECTED BY PHYSICIAN. Qty: 100 each, Refills: 3    Comments: Diagnosis 250.00             Time Spent on discharge is more than 30 minutes in the examination, evaluation, counseling and review of medications and discharge plan. Signed:    Lyssa James MD   6/3/2019      Thank you Jean-Paul Manrique MD for the opportunity to be involved in this patient's care.

## 2019-05-31 NOTE — CARE COORDINATION
Spoke with Aldair Healy, who said patient's cost for cefepime would be $3.59 per day for Rx IV ABX. Made referral to [facility] if home IV ABX is needed for home RN. SW/CM follow for final discharge dispo.     Jennifer Kaur MSW, LSW    [phone]      Electronically signed by IVETT Rodriguez, LSW on 5/31/2019 at 1:10 PM

## 2019-05-31 NOTE — CARE COORDINATION
Spoke with Precious with [facility] to inform her that patient medication has been changed and that SW unsure of final discharge dispo. Provided weekend SW/CM phone numbers to [facility]. Precious's number is [phone] if IV ABX are needed at home. [facility] [phone] has been made aware of possible home care needs with IV ABX for weekend. SW will notify weekend SW/CM of patient's possible needs for d/c over the weekend.       Corita Galeazzi, MSW, LSW    [phone]    Electronically signed by IVETT Hector, LSW on 5/31/2019 at 3:55 PM

## 2019-05-31 NOTE — CONSULTS
Infectious Diseases Inpatient Consult Note    Medical Student note - reviewed and modified, see Attending addendum at bottom    Reason for Consult:             Fever, chills, bacteremia   Requesting Physician:   Dr Karlos Burt   Primary Care Physician:  Ulisses Stewart MD  History Obtained From:   Pt, EPIC    Admit Date: 5/29/2019  Hospital Day: 3    CHIEF COMPLAINT:       Chief Complaint   Patient presents with    Fever    Fatigue    Urinary Frequency       HISTORY OF PRESENT ILLNESS:    Aguila Matta is a 80 y. o. female with PMH of bioprosthetic AVR (7/2013), AS, HFpEF, MI (2007), DMT2, GERD, polypectomy, hx of chronic transaminitis, HTN who presented to the ED on 5/29 with a fever (Tmax 101.4 at home), chills, urinary frequency. Has a hx of 3 blood infections last one was in 2013 with unidentified bug but possible source was a tooth abscess. She was found to be febrile (Tmax 103 on 5/29), WBC 6.2, CRP 8.1, lactate 1.23, UA was negative for UTI. She was started on cefepime and vancomycin. BRAYAN showed no vegetations. Today she is feeling well, has no complaints. She is still having urinary frequency but is reciveing fluids. Denies any rashes, lesions, sore throat, tooth abscess, cough, bloody stools. Denies fever, chills, chest pain, SOB, n/v.     Afebrile (T 98.1), WBC 4.6     Past Medical History:    Past Medical History:   Diagnosis Date    Allergic rhinitis, cause unspecified     Allergic urticaria     Angioneurotic edema not elsewhere classified     Aortic stenosis 12/9/2011    dx 11/2007 - echo - LVEF 55% , AV area 1.5 cm , peak velocity - 27 ECHO 6/2009 - LVEF 60%, AV area 1.3 cm , peak velocity 35  ECHO 12/1/2011 - LVEF -60-65%, AV area 0.99 cm , peak velocity 34  asymptomatic      Benign neoplasm of colon     Cancer ([facility] [address]     cervical 1964    Carcinoma in situ of cervix uteri     Coronary atherosclerosis of unspecified type of vessel, native or graft     Essential hypertension, benign  History of kidney stones     Internal hemorrhoids without mention of complication     MI, old 2007    Other and unspecified hyperlipidemia     Palpitations     Peptic ulcer, unspecified site, unspecified as acute or chronic, without mention of hemorrhage, perforation, or obstruction     Sebaceous cyst of breast 5/31/2012    Trigger finger 1/8/2013    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 Sara Santos  6/6/2013     Dr. Johnson Lilly - coronaries WNL , LVEF WNL    Genaro Lacer  5/2012    Dr. Rc Stevenson - [facility]    Dr. Nilda Borrero    COLONOSCOPY  03/30/2011    DR. Mello  - Internal hemorrhoids, sigmoid diverticulosis, hyperplastic polyps. Repeat in five  years.  COLONOSCOPY  02/23/2009    nternal hemorrhoids, Diverticulosis, Colon polyps. Repeat two years    COLONOSCOPY  5/8/2013     Dr. Petra Alvarado  - internal hemorrhoids , diverticulosis and polyps     COLONOSCOPY  5/4/2015, 3/7/2016    - hemorrhoids, diverticulosis, hyperplastic colon polyps, repeat in 1 year    COLONOSCOPY  1/28/2019    COLONOSCOPY WITH BIOPSY performed by Slade Bernal MD at [facility]  1/28/2019    COLONOSCOPY POLYPECTOMY ABLATION performed by Slade Bernal MD at [facility]  1/28/2019    COLONOSCOPY CONTROL HEMORRHAGE performed by Slade Bernal MD at [address] [facility] [facility]  [phone]       [facility] RETROGRADE, RIGHT STENT PLACEMENT    OTHER SURGICAL HISTORY  6/27/2013    Dr. Brock Martinez - CYSTOSCOPY RIGHT URETEROSCOPY, RENAL STONE EXTRACTION    UPPER GASTROINTESTINAL ENDOSCOPY  3/7/2016    gastritis and esophagitis     UPPER GASTROINTESTINAL ENDOSCOPY N/A 1/28/2019    EGD BIOPSY performed by Heena Manley MD at [facility] ENDOSCOPY       Current Medications:     aspirin  81 mg Oral Daily    pravastatin  40 mg Oral Daily    sodium chloride flush  10 mL Intravenous 2 times per day    famotidine  20 mg Oral BID    insulin lispro  0-6 Units Subcutaneous TID WC    insulin lispro  0-3 Units Subcutaneous Nightly    cefepime  2 g Intravenous Q12H       Allergies:  Actos [pioglitazone hydrochloride]; Lisinopril; Penicillins; Lipitor; Zocor [simvastatin]; and Glucophage [metformin hydrochloride]    Social History:    TOBACCO:    Never smoked  ETOH:    Denies alcohol use   DRUGS:   Denies IV and recreational drug use   MARITAL STATUS:     OCCUPATION:   Retired teacher    Patient lives at home, alone. Family History:   No immunodeficiency    REVIEW OF SYSTEMS:    No fever / chills / sweats. No weight loss. No visual change, eye pain, eye discharge. No oral lesion, sore throat, dysphagia. Denies cough / sputum. Denies chest pain, palpitations. Denies n / v / abd pain. No diarrhea. Denies dysuria. She has urinary frequency   Denies joint swelling. She has arthritis in hands. No myalgia, arthralgia. Denies skin changes, itching  Denies focal weakness, sensory change or other neurologic symptom    Denies new / worse depression, psychiatric symptoms    PHYSICAL EXAM:      Vitals:    BP (!) 120/56   Pulse 62   Temp 98.1 °F (36.7 °C) (Oral)   Resp 18   Ht 5' 4\" (1.626 m)   Wt 172 lb (78 kg)   SpO2 96%   BMI 29.52 kg/m²     GENERAL: No apparent distress.     HEENT: Membranes moist, no oral lesion  NECK:  Supple  LUNGS: Clear b/l, no rales, no dullness  CARDIAC: RRR, systolic murmur appreciated  ABD:  + BS, soft, non-distended, nontender  EXT:  No rash, no edema, no lesions  NEURO: No focal neurologic findings  PSYCH: Orientation, sensorium, mood normal  LINES:  Peripheral iv x2    DATA:    Lab Results   Component Value Date    WBC 4.6 05/31/2019    HGB 12.1 05/31/2019    HCT 35.7 (L) 05/31/2019    MCV 93.6 05/31/2019    PLT 60 (L) 05/31/2019     Lab Results   Component Value Date    CREATININE <0.5 (L) 05/31/2019    BUN 9 05/31/2019     05/31/2019    K 3.7 05/31/2019     05/31/2019    CO2 21 05/31/2019       Hepatic Function Panel:   Lab Results   Component Value Date    ALKPHOS 83 05/29/2019    ALT 36 05/29/2019    AST 65 05/29/2019    PROT 6.9 05/29/2019    PROT 7.4 10/09/2012    BILITOT 1.2 05/29/2019    BILIDIR 0.3 05/09/2019    IBILI 1.0 05/09/2019    LABALBU 3.9 05/29/2019       Micro:  5/31 urine cx: NG at 18-36 hrs   5/30 blood cx 1: + gram positive cocci, Streptococcus   5/30 blood cx 2: + gram positive cocci, Streptococcus (aerobic, anaerobic bottles)  5/30 blood cx PCR: streptococcus detected, negative for Strep agalactiae (group B), pneumoniae, pyogenes (Group A)      Imaging:     BRAYAN (5/31):  Normal left ventricle size. There is mild concentric left ventricular hypertrophy. Overall left ventricular systolic function appears normal. Mild multiple jet mitral regurgitation is present, mild to moderate MR. A bioprosthetic artificial aortic valve appears well seated with a maximum velocity of 3.53m/s and a mean gradient of 26mmHg.   Eccentric aortic valve regurgitation jet. Mild to moderate Aortic regurgitation. A bubble study was performed and fails to show evidence of shunting. No vegetations noted on valvular leaflets. Echo (5/30):  Normal left ventricle size. There is mild concentric left ventricular hypertrophy. Overall left ventricular systolic function appears hyperdynamic with an ejection fraction visually estimated at >65%.  No regional wall motion abnormalities are noted. Diastolic filling parameters suggest grade   II diastolic dysfunction. Mild to moderate eccentric mitral regurgitation is present.  A bioprosthetic aortic valve appears well seated with a maximum velocity of 4.8m/s and a mean gradient of 56mmHg. There is probable moderate to severe prosthetic aortic valve stenosis. and moderate eccentric aortic regurgitation. A aortic valve vegetation can not be entirely excluded. Moderate tricuspid regurgitation. Estimated pulmonary artery systolic pressure is elevated at 42 mmHg assuming a right atrial pressure of 8 mmHg. The left atrium is mildly dilated. CXR (5/30): No evidence of acute cardiopulmonary disease    PA and lateral radiographs of the chest were obtained. Patient is status post a prior sternotomy. Heart size is normal. No infiltrate, effusion or pneumothorax is identified. Degenerative changes are identified at the bilateral glenohumeral joints        CXR (5/29):   Clear lungs.       Normal cardiomediastinal silhouette status post sternotomy       IMPRESSION:    Anna Marie Quiñonez is a 80 y. o. female with PMH of bioprosthetic AVR (7/2013), AS, HFpEF, MI (2007), DMT2, GERD, polypectomy, hx of chronic transaminitis, HTN who presented to the ED on 5/29 with a fever (Tmax 101.4 at home), chills, urinary frequency.  ID consulted for fever, bacteremia.    - Streptococcus bacteremia   - bioprosthetic aortic valve with mod-severe AS and mod AR, vegetation possible  - UA negative, urine cx NG 18-36 hrs    Patient Active Problem List   Diagnosis    Hypertension    Hypercholesteremia    Vitamin D deficiency    Coronary atherosclerosis    Transient cerebral ischemia    Asthma    Peptic ulcer    Benign neoplasm of colon    Allergic rhinitis    Type 2 diabetes mellitus without complication, without long-term current use of insulin ([address]    Internal hemorrhoids    Carcinoma in situ of cervix uteri    Diverticulosis of sigmoid colon    Nephrolithiasis    Osteopenia    S/P AVR (aortic valve replacement)    Syncope and collapse    Bifascicular block    Thrombocytopenia (HCC)    Dizziness    Near syncope    Fever       RECOMMENDATIONS:    - d/c vanc, cefepime   - start ceftriaxone  - tx with 2 wks of abx     Discussed with pt, daughter  Neelam Harrison, MS IV    Addendum to Medical Student Consult note:  Pt seen,examined and evaluated. I have independently performed history, physical exam, lab and data review. I have determined assessment and plan as documented by student José Downing). 81 yo woman DM, CAD, CHF, AVR (7/2013)  Hx prior episodes of E coli bacteremia, last 3/2013  Hx diverticulosis, polyps - sees Dr Narinder Leung, last colonoscopy 4 weeks ago (per pt)    Presents on 5/29 fever and chills. + urinary frequency - no dysuria. In ED Tm 103, WBC 5.2. UA neg, CXR neg. BC   BC + GPC, Streptococcus sp by PCR (not GAS, GBS, S pneumoniae.     TTE done - BRAYAN with no vegetation (Nery 5/29/19)    IMP/  Streptococcal bacteremia   - unknown source, suspect GI tract (had recent colonoscopy   - given AVR, high risk for seeding valve and would give long course of treatment (despite negative BRAYAN)    REC/  Ceftriaxone 2 gm iv daily x 4 weeks  Will f/u on BC ID / sensitivity  PICC    Contacted Dr Jas Parker office and will FAX note and colonoscopy report  See DANILO    Discussed with pt and daughter, RN, Medical Resident  Gayathri Garcia MD    INFUSION ORDERS:  Ceftriaxone 2 gm iv daily through 6/25  - First dose given in hospital  - Disposition / date discharge - home / [place] 6/3  - Check CBC w diff, CMP, ESR, CRP every Mon or Melum 64 result to [phone]  - Call with antibiotic / infusion issues, [phone]  - No f/u in outpatient ID office necessary

## 2019-06-01 LAB
ANION GAP SERPL CALCULATED.3IONS-SCNC: 11 MMOL/L (ref 3–16)
BASOPHILS ABSOLUTE: 0 K/UL (ref 0–0.2)
BASOPHILS RELATIVE PERCENT: 0.5 %
BLOOD SMEAR REVIEW: NORMAL
BUN BLDV-MCNC: 6 MG/DL (ref 7–20)
CALCIUM SERPL-MCNC: 8.7 MG/DL (ref 8.3–10.6)
CHLORIDE BLD-SCNC: 107 MMOL/L (ref 99–110)
CO2: 23 MMOL/L (ref 21–32)
CREAT SERPL-MCNC: <0.5 MG/DL (ref 0.6–1.2)
EOSINOPHILS ABSOLUTE: 0.3 K/UL (ref 0–0.6)
EOSINOPHILS RELATIVE PERCENT: 7.7 %
GFR AFRICAN AMERICAN: >60
GFR NON-AFRICAN AMERICAN: >60
GLUCOSE BLD-MCNC: 105 MG/DL (ref 70–99)
GLUCOSE BLD-MCNC: 106 MG/DL (ref 70–99)
GLUCOSE BLD-MCNC: 109 MG/DL (ref 70–99)
GLUCOSE BLD-MCNC: 130 MG/DL (ref 70–99)
GLUCOSE BLD-MCNC: 134 MG/DL (ref 70–99)
HCT VFR BLD CALC: 35.3 % (ref 36–48)
HEMOGLOBIN: 12.1 G/DL (ref 12–16)
HEPARIN INDUCED PLATELET ANTIBODY: NEGATIVE
LYMPHOCYTES ABSOLUTE: 1 K/UL (ref 1–5.1)
LYMPHOCYTES RELATIVE PERCENT: 25.3 %
MCH RBC QN AUTO: 31.4 PG (ref 26–34)
MCHC RBC AUTO-ENTMCNC: 34.3 G/DL (ref 31–36)
MCV RBC AUTO: 91.6 FL (ref 80–100)
MONOCYTES ABSOLUTE: 0.4 K/UL (ref 0–1.3)
MONOCYTES RELATIVE PERCENT: 9.8 %
NEUTROPHILS ABSOLUTE: 2.3 K/UL (ref 1.7–7.7)
NEUTROPHILS RELATIVE PERCENT: 56.7 %
PDW BLD-RTO: 14.2 % (ref 12.4–15.4)
PERFORMED ON: ABNORMAL
PLATELET # BLD: 63 K/UL (ref 135–450)
PMV BLD AUTO: 9.3 FL (ref 5–10.5)
POTASSIUM REFLEX MAGNESIUM: 4.1 MMOL/L (ref 3.5–5.1)
RBC # BLD: 3.85 M/UL (ref 4–5.2)
SODIUM BLD-SCNC: 141 MMOL/L (ref 136–145)
WBC # BLD: 4.1 K/UL (ref 4–11)

## 2019-06-01 PROCEDURE — 85025 COMPLETE CBC W/AUTO DIFF WBC: CPT

## 2019-06-01 PROCEDURE — 6370000000 HC RX 637 (ALT 250 FOR IP): Performed by: STUDENT IN AN ORGANIZED HEALTH CARE EDUCATION/TRAINING PROGRAM

## 2019-06-01 PROCEDURE — 2580000003 HC RX 258: Performed by: STUDENT IN AN ORGANIZED HEALTH CARE EDUCATION/TRAINING PROGRAM

## 2019-06-01 PROCEDURE — 6360000002 HC RX W HCPCS: Performed by: STUDENT IN AN ORGANIZED HEALTH CARE EDUCATION/TRAINING PROGRAM

## 2019-06-01 PROCEDURE — 36415 COLL VENOUS BLD VENIPUNCTURE: CPT

## 2019-06-01 PROCEDURE — 87040 BLOOD CULTURE FOR BACTERIA: CPT

## 2019-06-01 PROCEDURE — 80048 BASIC METABOLIC PNL TOTAL CA: CPT

## 2019-06-01 PROCEDURE — 6360000002 HC RX W HCPCS: Performed by: INTERNAL MEDICINE

## 2019-06-01 PROCEDURE — 1200000000 HC SEMI PRIVATE

## 2019-06-01 PROCEDURE — 2580000003 HC RX 258: Performed by: INTERNAL MEDICINE

## 2019-06-01 PROCEDURE — 99232 SBSQ HOSP IP/OBS MODERATE 35: CPT | Performed by: INTERNAL MEDICINE

## 2019-06-01 RX ADMIN — ENOXAPARIN SODIUM 40 MG: 40 INJECTION SUBCUTANEOUS at 09:09

## 2019-06-01 RX ADMIN — FAMOTIDINE 20 MG: 20 TABLET ORAL at 09:08

## 2019-06-01 RX ADMIN — Medication 10 ML: at 09:14

## 2019-06-01 RX ADMIN — ASPIRIN 81 MG 81 MG: 81 TABLET ORAL at 09:08

## 2019-06-01 RX ADMIN — PRAVASTATIN SODIUM 40 MG: 40 TABLET ORAL at 09:08

## 2019-06-01 RX ADMIN — Medication 10 ML: at 21:51

## 2019-06-01 RX ADMIN — CEFTRIAXONE SODIUM 2 G: 2 INJECTION, POWDER, FOR SOLUTION INTRAMUSCULAR; INTRAVENOUS at 17:56

## 2019-06-01 RX ADMIN — SODIUM CHLORIDE, SODIUM LACTATE, POTASSIUM CHLORIDE, AND CALCIUM CHLORIDE: 600; 310; 30; 20 INJECTION, SOLUTION INTRAVENOUS at 06:24

## 2019-06-01 RX ADMIN — FAMOTIDINE 20 MG: 20 TABLET ORAL at 21:51

## 2019-06-01 ASSESSMENT — PAIN SCALES - GENERAL
PAINLEVEL_OUTOF10: 0

## 2019-06-01 NOTE — PROGRESS NOTES
Vitals:    05/31/19 1847   BP: 122/76   Pulse: 62   Resp: 18   Temp: 98 °F (36.7 °C)   SpO2: 95%     Patient arrived to [facility] via nursing staff. Patient oriented to room and call light. Patient instructed to call for assistance. Patient verbalized understanding.

## 2019-06-01 NOTE — PROGRESS NOTES
Internal Medicine  Resident Progress note                                        Admit Date: 5/29/2019      Hospital Day: 4  Code:Limited  Chief complain: fever/ chills   Patient seen and examined, labs and records reviewed    Interval Hx:  Well controlled- no changes in medication today. She was sweating overnight but no fever. No bleeding. No headache. No rash/ limb pain.      Past Medical History:   Diagnosis Date    Allergic rhinitis, cause unspecified     Allergic urticaria     Angioneurotic edema not elsewhere classified     Aortic stenosis 12/9/2011    dx 11/2007 - echo - LVEF 55% , AV area 1.5 cm , peak velocity - 27 ECHO 6/2009 - LVEF 60%, AV area 1.3 cm , peak velocity 35  ECHO 12/1/2011 - LVEF -60-65%, AV area 0.99 cm , peak velocity 34  asymptomatic      Benign neoplasm of colon     Cancer ([facility] [address]     cervical 1964    Carcinoma in situ of cervix uteri     Coronary atherosclerosis of unspecified type of vessel, native or graft     Essential hypertension, benign     History of kidney stones     Internal hemorrhoids without mention of complication     MI, old 2007    Other and unspecified hyperlipidemia     Palpitations     Peptic ulcer, unspecified site, unspecified as acute or chronic, without mention of hemorrhage, perforation, or obstruction     Sebaceous cyst of breast 5/31/2012    Trigger finger 1/8/2013    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, not stated as uncontrolled(250.50)     Type II or unspecified type diabetes mellitus without mention of complication, not stated as uncontrolled     Unspecified asthma(493.90)     seasonal    Unspecified transient cerebral ischemia     Unspecified vitamin D deficiency        Data:   Scheduled Meds:   enoxaparin  40 mg Subcutaneous Daily    cefTRIAXone (ROCEPHIN) IV  2 g Intravenous Q24H    aspirin  81 mg Oral Daily    pravastatin  40 mg Oral Daily    sodium chloride flush  10 mL Intravenous 2 07/13/2013    [id number] 36 07/13/2013       -----------------------------------------------------------------  RADIOLOGY:  XR CHEST STANDARD (2 VW)   Final Result   1. No evidence of acute cardiopulmonary disease. XR CHEST PORTABLE   Final Result      Clear lungs. Normal cardiomediastinal silhouette status post sternotomy. Echo: 5/31/2019  Summary   Normal left ventricle size. There is mild concentric left ventricular   hypertrophy. Overall left ventricular systolic function appears normal.   Mild multiple jet mitral regurgitation is present, mild to moderate MR.   A bioprosthetic artificial aortic valve appears well seated with a maximum   velocity of 3.53m/s and a mean gradient of 26mmHg.   Eccentric aortic valve regurgitation jet. Mild to moderate Aortic   regurgitation.   A bubble study was performed and fails to show evidence of shunting.   No vegetations noted on valvular leaflets. Micro:    Gram positive cocci in chains and/or pairs   resembling Streptococcus   Information to follow   Gram stain Aerobic bottle:   Gram positive cocci in chains and/or pairs   resembling Streptococcus   Information to follow   Abnormal     Organism Streptococcus speciesAbnormal          ================================================================  Assessment & Plan:    Safia Sprague is a 80 y.o. , female patient admitted for sepsis 2/2 bacteremia     # Bacteremia, strep   Sepsis resolved, strep X2 growing in [facility]. BRAYAN r/o valve vegetation. Pt is feelign better, asymptomatic and afebrile per ID ceftriaxone 14 days to prevent aortic valve infection. Source of bacteremia unclear. Discussed with microbiology labs--> further characterization of strep expected today.      # Thrombocytopenia  Dropped from 80k-->100k (day of admission)-->60k-->60 k stable  No evidence of bleeding. Spleen no enlarged on exam.   Hb and WBC ok. 4t score <=2  Thrombocytopenia most likley 2/2 BAx.   keep monitoring plt.  Smear ordered again      # Sepsis 2/2 bactermeia- sepsis resolved   SOFA 4 (+2) on admission.      # HFpEF   GiiDD  With mod AS, mod TR.    Compensated, cardiology consulted      # HTN  Not active  Monitor BP      # DM  type II   Controlled   Keep SSI   Monitor glucose levels      # HLD  Keep statin      Discussed in detail with pt, daughter and senior resident (Dr Von Sever)    DVT Prophylaxis: Lovenox   Diet: DM  Code Status: Limited   PT/OT Eval Status: Pending   Deysi Fisher MD  Pager: [phone]  6/1/2019  11:50 AM

## 2019-06-01 NOTE — PLAN OF CARE
Problem: Physical Regulation:  Goal: Diagnostic test results will improve  Description  Diagnostic test results will improve  Outcome: Ongoing  Note:   IVF infusing per MAR. Urine output adequate. WBC WNL. Pt has been afebrile this shift.   Will continue to monitor

## 2019-06-02 ENCOUNTER — APPOINTMENT (OUTPATIENT)
Dept: GENERAL RADIOLOGY | Age: 84
DRG: 314 | End: 2019-06-02
Payer: MEDICARE

## 2019-06-02 ENCOUNTER — APPOINTMENT (OUTPATIENT)
Dept: CT IMAGING | Age: 84
DRG: 314 | End: 2019-06-02
Payer: MEDICARE

## 2019-06-02 LAB
ANION GAP SERPL CALCULATED.3IONS-SCNC: 11 MMOL/L (ref 3–16)
BASOPHILS ABSOLUTE: 0 K/UL (ref 0–0.2)
BASOPHILS RELATIVE PERCENT: 0.7 %
BLOOD CULTURE, ROUTINE: ABNORMAL
BLOOD CULTURE, ROUTINE: ABNORMAL
BUN BLDV-MCNC: 5 MG/DL (ref 7–20)
CALCIUM SERPL-MCNC: 8.8 MG/DL (ref 8.3–10.6)
CHLORIDE BLD-SCNC: 105 MMOL/L (ref 99–110)
CO2: 23 MMOL/L (ref 21–32)
CREAT SERPL-MCNC: <0.5 MG/DL (ref 0.6–1.2)
CULTURE, BLOOD 2: ABNORMAL
EOSINOPHILS ABSOLUTE: 0.3 K/UL (ref 0–0.6)
EOSINOPHILS RELATIVE PERCENT: 7.1 %
GFR AFRICAN AMERICAN: >60
GFR NON-AFRICAN AMERICAN: >60
GLUCOSE BLD-MCNC: 107 MG/DL (ref 70–99)
GLUCOSE BLD-MCNC: 108 MG/DL (ref 70–99)
GLUCOSE BLD-MCNC: 112 MG/DL (ref 70–99)
GLUCOSE BLD-MCNC: 125 MG/DL (ref 70–99)
GLUCOSE BLD-MCNC: 131 MG/DL (ref 70–99)
HCT VFR BLD CALC: 34.9 % (ref 36–48)
HEMOGLOBIN: 11.9 G/DL (ref 12–16)
LYMPHOCYTES ABSOLUTE: 0.9 K/UL (ref 1–5.1)
LYMPHOCYTES RELATIVE PERCENT: 19.2 %
MCH RBC QN AUTO: 31.2 PG (ref 26–34)
MCHC RBC AUTO-ENTMCNC: 33.9 G/DL (ref 31–36)
MCV RBC AUTO: 92 FL (ref 80–100)
MONOCYTES ABSOLUTE: 0.5 K/UL (ref 0–1.3)
MONOCYTES RELATIVE PERCENT: 10.5 %
NEUTROPHILS ABSOLUTE: 3 K/UL (ref 1.7–7.7)
NEUTROPHILS RELATIVE PERCENT: 62.5 %
ORGANISM: ABNORMAL
PDW BLD-RTO: 14.2 % (ref 12.4–15.4)
PERFORMED ON: ABNORMAL
PLATELET # BLD: 79 K/UL (ref 135–450)
PMV BLD AUTO: 10.2 FL (ref 5–10.5)
POTASSIUM REFLEX MAGNESIUM: 3.9 MMOL/L (ref 3.5–5.1)
RBC # BLD: 3.8 M/UL (ref 4–5.2)
SODIUM BLD-SCNC: 139 MMOL/L (ref 136–145)
WBC # BLD: 4.8 K/UL (ref 4–11)

## 2019-06-02 PROCEDURE — 6360000002 HC RX W HCPCS: Performed by: INTERNAL MEDICINE

## 2019-06-02 PROCEDURE — 85025 COMPLETE CBC W/AUTO DIFF WBC: CPT

## 2019-06-02 PROCEDURE — 6370000000 HC RX 637 (ALT 250 FOR IP): Performed by: STUDENT IN AN ORGANIZED HEALTH CARE EDUCATION/TRAINING PROGRAM

## 2019-06-02 PROCEDURE — 71045 X-RAY EXAM CHEST 1 VIEW: CPT

## 2019-06-02 PROCEDURE — 6360000002 HC RX W HCPCS: Performed by: STUDENT IN AN ORGANIZED HEALTH CARE EDUCATION/TRAINING PROGRAM

## 2019-06-02 PROCEDURE — 6360000004 HC RX CONTRAST MEDICATION: Performed by: STUDENT IN AN ORGANIZED HEALTH CARE EDUCATION/TRAINING PROGRAM

## 2019-06-02 PROCEDURE — 99233 SBSQ HOSP IP/OBS HIGH 50: CPT | Performed by: INTERNAL MEDICINE

## 2019-06-02 PROCEDURE — 1200000000 HC SEMI PRIVATE

## 2019-06-02 PROCEDURE — 36415 COLL VENOUS BLD VENIPUNCTURE: CPT

## 2019-06-02 PROCEDURE — 2580000003 HC RX 258: Performed by: STUDENT IN AN ORGANIZED HEALTH CARE EDUCATION/TRAINING PROGRAM

## 2019-06-02 PROCEDURE — 2580000003 HC RX 258: Performed by: INTERNAL MEDICINE

## 2019-06-02 PROCEDURE — 80048 BASIC METABOLIC PNL TOTAL CA: CPT

## 2019-06-02 PROCEDURE — 99232 SBSQ HOSP IP/OBS MODERATE 35: CPT | Performed by: INTERNAL MEDICINE

## 2019-06-02 PROCEDURE — 74177 CT ABD & PELVIS W/CONTRAST: CPT

## 2019-06-02 RX ORDER — FLUTICASONE PROPIONATE 50 MCG
1 SPRAY, SUSPENSION (ML) NASAL DAILY
Status: DISCONTINUED | OUTPATIENT
Start: 2019-06-02 | End: 2019-06-03 | Stop reason: HOSPADM

## 2019-06-02 RX ORDER — GUAIFENESIN/DEXTROMETHORPHAN 100-10MG/5
5 SYRUP ORAL EVERY 4 HOURS PRN
Status: DISCONTINUED | OUTPATIENT
Start: 2019-06-02 | End: 2019-06-03 | Stop reason: HOSPADM

## 2019-06-02 RX ORDER — CETIRIZINE HYDROCHLORIDE 10 MG/1
10 TABLET ORAL DAILY
Status: DISCONTINUED | OUTPATIENT
Start: 2019-06-02 | End: 2019-06-03 | Stop reason: HOSPADM

## 2019-06-02 RX ADMIN — Medication 10 ML: at 08:27

## 2019-06-02 RX ADMIN — PRAVASTATIN SODIUM 40 MG: 40 TABLET ORAL at 08:27

## 2019-06-02 RX ADMIN — FLUTICASONE PROPIONATE 1 SPRAY: 50 SPRAY, METERED NASAL at 10:19

## 2019-06-02 RX ADMIN — ASPIRIN 81 MG 81 MG: 81 TABLET ORAL at 08:27

## 2019-06-02 RX ADMIN — Medication 10 ML: at 22:03

## 2019-06-02 RX ADMIN — FAMOTIDINE 20 MG: 20 TABLET ORAL at 22:02

## 2019-06-02 RX ADMIN — ENOXAPARIN SODIUM 40 MG: 40 INJECTION SUBCUTANEOUS at 08:27

## 2019-06-02 RX ADMIN — GUAIFENESIN AND DEXTROMETHORPHAN 5 ML: 100; 10 SYRUP ORAL at 22:08

## 2019-06-02 RX ADMIN — SODIUM CHLORIDE, SODIUM LACTATE, POTASSIUM CHLORIDE, AND CALCIUM CHLORIDE: 600; 310; 30; 20 INJECTION, SOLUTION INTRAVENOUS at 01:22

## 2019-06-02 RX ADMIN — CEFTRIAXONE SODIUM 2 G: 2 INJECTION, POWDER, FOR SOLUTION INTRAMUSCULAR; INTRAVENOUS at 17:48

## 2019-06-02 RX ADMIN — CETIRIZINE HYDROCHLORIDE 10 MG: 10 TABLET, FILM COATED ORAL at 10:19

## 2019-06-02 RX ADMIN — FAMOTIDINE 20 MG: 20 TABLET ORAL at 08:26

## 2019-06-02 RX ADMIN — IOPAMIDOL 80 ML: 755 INJECTION, SOLUTION INTRAVENOUS at 16:57

## 2019-06-02 ASSESSMENT — PAIN SCALES - GENERAL
PAINLEVEL_OUTOF10: 0

## 2019-06-02 NOTE — PROGRESS NOTES
ID Follow-up NOTE    CC:   Streptococcal bacteremia  Antibiotics: Ceftriaxone    Admit Date: 2019  Hospital Day: 5    Subjective:     Patient feel good, has cough (attributes to allergies)      Objective:     Patient Vitals for the past 8 hrs:   BP Temp Temp src Pulse Resp SpO2   19 0826 129/71 98.5 °F (36.9 °C) Oral 61 16 94 %   19 0526 135/85 98.1 °F (36.7 °C) Oral 64 18 98 %     I/O last 3 completed shifts: In: 2476 [P.O.:480; I.V.:1745]  Out: 6805 [Urine:3050]  I/O this shift:  In: -   Out: 425 [Urine:425]    EXAM:  GENERAL: No apparent distress.     HEENT: Membranes moist, no oral lesion  NECK:  Supple  LUNGS: Clear b/l, no rales, no dullness  CARDIAC: RRR, no murmur appreciated  ABD:  + BS, soft / NT  EXT:  No rash, no edema, no lesions  NEURO: No focal neurologic findings  PSYCH: Orientation, sensorium, mood normal  LINES:  Peripheral iv     Data Review:  Lab Results   Component Value Date    WBC 4.8 2019    HGB 11.9 (L) 2019    HCT 34.9 (L) 2019    MCV 92.0 2019    PLT 79 (L) 2019     Lab Results   Component Value Date    CREATININE <0.5 (L) 2019    BUN 5 (L) 2019     2019    K 3.9 2019     2019    CO2 23 2019       Hepatic Function Panel:   Lab Results   Component Value Date    ALKPHOS 83 2019    ALT 36 2019    AST 65 2019    PROT 6.9 2019    PROT 7.4 10/09/2012    BILITOT 1.2 2019    BILIDIR 0.3 2019    IBILI 1.0 2019    LABALBU 3.9 2019       MICRO:   Urine cult no growth   BC x 2 Streptococcus salivarius    IMAGIN/30 CXR neg    ECHO:   BRAYAN - no vegetations seen     Scheduled Meds:   cetirizine  10 mg Oral Daily    fluticasone  1 spray Each Nostril Daily    enoxaparin  40 mg Subcutaneous Daily    cefTRIAXone (ROCEPHIN) IV  2 g Intravenous Q24H    aspirin  81 mg Oral Daily    pravastatin  40 mg Oral Daily    sodium chloride flush  10 mL Intravenous 2 times per day    famotidine  20 mg Oral BID    insulin lispro  0-6 Units Subcutaneous TID WC    insulin lispro  0-3 Units Subcutaneous Nightly       Continuous Infusions:   dextrose         PRN Meds:  guaiFENesin-dextromethorphan, acetaminophen, sodium chloride flush, magnesium hydroxide, ondansetron, glucose, dextrose, glucagon (rDNA), dextrose, perflutren lipid microspheres      Assessment:     79 yo woman DM, CAD, CHF, AVR (7/2013)  Hx prior episodes of E coli bacteremia, last 3/2013  Hx diverticulosis, polyps - sees Dr Socorro Avila, last colonoscopy 4 weeks ago (per pt)     Presents on 5/29 fever and chills. + urinary frequency - no dysuria. In ED Tm 103, WBC 5.2. UA neg, CXR neg.   BC   BC x2, Streptococcus salivarius   TTE done - BRAYAN with no vegetation (Nery 5/29/19)     IMP/  Streptococcal bacteremia   - oral / GI tract source   - given AVR, high risk for seeding valve and would give long course of treatment (despite negative BRAYAN)    Plan:     Ceftriaxone 2 gm iv daily x 4 weeks  PICC     Contacted Dr Winkler Drought office 5/30 - let him know pt was here, obtain reports  See DANILO     Discussed with pt and daughter, son in [facility]  Jeromy Hare MD     INFUSION ORDERS:  Ceftriaxone 2 gm iv daily through 6/25  - First dose given in hospital  - Disposition / date discharge - home / Flory Astorga 6/3  - Check CBC w diff, CMP, ESR, CRP every Mon or Melum 64 result to [phone]  - Call with antibiotic / infusion issues, [phone]  - No f/u in outpatient ID office necessary

## 2019-06-02 NOTE — PROGRESS NOTES
Internal Medicine PGY- 1 Resident Progress Note        PCP: Robert Badillo MD    Date of Admission: 5/29/2019    Chief Complaint: chills     Subjective: Patient was seen this morning at bedside with daughter present. Was feeling much better. No complaints. No nausea, vomiting, fever, chest pain, shortness of breath. She is mentating normally. Afebrile overnight. Vital signs stable. Blood pressure stable. Will need extended antibiotic therapy. Medications:  Reviewed    Infusion Medications    lactated ringers 100 mL/hr at 06/02/19 0122    dextrose       Scheduled Medications    enoxaparin  40 mg Subcutaneous Daily    cefTRIAXone (ROCEPHIN) IV  2 g Intravenous Q24H    aspirin  81 mg Oral Daily    pravastatin  40 mg Oral Daily    sodium chloride flush  10 mL Intravenous 2 times per day    famotidine  20 mg Oral BID    insulin lispro  0-6 Units Subcutaneous TID WC    insulin lispro  0-3 Units Subcutaneous Nightly     PRN Meds: acetaminophen, sodium chloride flush, magnesium hydroxide, ondansetron, glucose, dextrose, glucagon (rDNA), dextrose, perflutren lipid microspheres      Intake/Output Summary (Last 24 hours) at 6/2/2019 0907  Last data filed at 6/2/2019 0525  Gross per 24 hour   Intake 1985 ml   Output 3050 ml   Net -1065 ml       Physical Exam Performed:    /71   Pulse 61   Temp 98.5 °F (36.9 °C) (Oral)   Resp 16   Ht 5' 4\" (1.626 m)   Wt 172 lb (78 kg)   SpO2 94%   BMI 29.52 kg/m²     General appearance: No apparent distress, appears stated age and cooperative. Tired. Ill appearing  HEENT: Pupils equal, round, and reactive to light. Conjunctivae/corneas clear. Neck: Supple, with full range of motion. No jugular venous distention. Trachea midline. Respiratory:  Normal respiratory effort. Clear to auscultation, bilaterally without Rales/Wheezes/Rhonchi. Cardiovascular: Regular rate and rhythum, Mechanical valve sounds.    Abdomen: Soft, non-tender, non-distended with normal bowel sounds. Musculoskeletal: No clubbing, cyanosis or edema bilaterally. Full range of motion without deformity. Skin: Skin color, texture, turgor normal.  No rashes or lesions. Labs:   Recent Labs     05/31/19  0634 06/01/19  0652 06/02/19  0715   WBC 4.6 4.1 4.8   HGB 12.1 12.1 11.9*   HCT 35.7* 35.3* 34.9*   PLT 60* 63* 79*     Recent Labs     05/31/19  0634 06/01/19  0652 06/02/19  0715    141 139   K 3.7 4.1 3.9    107 105   CO2 21 23 23   BUN 9 6* 5*   CREATININE <0.5* <0.5* <0.5*   CALCIUM 8.3 8.7 8.8     No results for input(s): AST, ALT, BILIDIR, BILITOT, ALKPHOS in the last 72 hours. No results for input(s): INR in the last 72 hours. No results for input(s): Georga Rout in the last 72 hours. Urinalysis:      Lab Results   Component Value Date    NITRU Negative 05/29/2019    WBCUA 3-5 01/05/2019    BACTERIA 1+ 01/05/2019    RBCUA 0-2 01/05/2019    BLOODU Negative 05/29/2019    SPECGRAV 1.010 05/29/2019    GLUCOSEU Negative 05/29/2019       Radiology:  XR CHEST STANDARD (2 VW)   Final Result   1. No evidence of acute cardiopulmonary disease. XR CHEST PORTABLE   Final Result      Clear lungs. Normal cardiomediastinal silhouette status post sternotomy.               Assessment/Plan:    Sepsis secondary to bacteremia 2/2 strep salivarius   - Most likely dental in origin   - presented with Fever, chills, Fatigue   - Transesophageal negative for vegetations   - Afebrile, Vital signs stable, normal white count    - Ceftriaxone total 4 weeks 6/25/2019  - Pending blood cultures If negative PICC line placed     Upper respiratory symptoms  - Likely viral vs Allergic post nasal drip cough    - Cough, white sputum production   - Chest xray follow up   - Robitussin   - Flonase   - Claritin     Thrombocytopenia - improving   - 4T score was zero   - Continue lovenox   - most likely secondary to acute infection      Chronic stable CHFpEF Gr2DD   - Overall left ventricular systolic

## 2019-06-02 NOTE — PLAN OF CARE
Problem: Physical Regulation:  Goal: Ability to maintain vital signs within normal range will improve  Note:   VSS. Patient denies pain and or SOB. IV fluids infusing . Monitoring daily labs. Will continue to monitor.

## 2019-06-03 ENCOUNTER — APPOINTMENT (OUTPATIENT)
Dept: GENERAL RADIOLOGY | Age: 84
DRG: 314 | End: 2019-06-03
Payer: MEDICARE

## 2019-06-03 ENCOUNTER — APPOINTMENT (OUTPATIENT)
Dept: ULTRASOUND IMAGING | Age: 84
DRG: 314 | End: 2019-06-03
Payer: MEDICARE

## 2019-06-03 VITALS
SYSTOLIC BLOOD PRESSURE: 151 MMHG | RESPIRATION RATE: 18 BRPM | DIASTOLIC BLOOD PRESSURE: 63 MMHG | HEIGHT: 64 IN | WEIGHT: 172 LBS | BODY MASS INDEX: 29.37 KG/M2 | HEART RATE: 67 BPM | OXYGEN SATURATION: 95 % | TEMPERATURE: 97.6 F

## 2019-06-03 LAB
ANION GAP SERPL CALCULATED.3IONS-SCNC: 11 MMOL/L (ref 3–16)
BASOPHILS ABSOLUTE: 0 K/UL (ref 0–0.2)
BASOPHILS RELATIVE PERCENT: 0.6 %
BUN BLDV-MCNC: 6 MG/DL (ref 7–20)
CALCIUM SERPL-MCNC: 8.9 MG/DL (ref 8.3–10.6)
CHLORIDE BLD-SCNC: 104 MMOL/L (ref 99–110)
CO2: 24 MMOL/L (ref 21–32)
CREAT SERPL-MCNC: <0.5 MG/DL (ref 0.6–1.2)
EOSINOPHILS ABSOLUTE: 0.3 K/UL (ref 0–0.6)
EOSINOPHILS RELATIVE PERCENT: 7.1 %
GFR AFRICAN AMERICAN: >60
GFR NON-AFRICAN AMERICAN: >60
GLUCOSE BLD-MCNC: 108 MG/DL (ref 70–99)
GLUCOSE BLD-MCNC: 111 MG/DL (ref 70–99)
GLUCOSE BLD-MCNC: 115 MG/DL (ref 70–99)
GLUCOSE BLD-MCNC: 90 MG/DL (ref 70–99)
HCT VFR BLD CALC: 36.5 % (ref 36–48)
HEMOGLOBIN: 12.4 G/DL (ref 12–16)
INR BLD: 1.27 (ref 0.86–1.14)
LYMPHOCYTES ABSOLUTE: 1.1 K/UL (ref 1–5.1)
LYMPHOCYTES RELATIVE PERCENT: 21.9 %
MCH RBC QN AUTO: 31.4 PG (ref 26–34)
MCHC RBC AUTO-ENTMCNC: 34 G/DL (ref 31–36)
MCV RBC AUTO: 92.2 FL (ref 80–100)
MONOCYTES ABSOLUTE: 0.5 K/UL (ref 0–1.3)
MONOCYTES RELATIVE PERCENT: 11.2 %
NEUTROPHILS ABSOLUTE: 2.9 K/UL (ref 1.7–7.7)
NEUTROPHILS RELATIVE PERCENT: 59.2 %
PDW BLD-RTO: 14.3 % (ref 12.4–15.4)
PERFORMED ON: ABNORMAL
PERFORMED ON: ABNORMAL
PERFORMED ON: NORMAL
PLATELET # BLD: 85 K/UL (ref 135–450)
PMV BLD AUTO: 9.4 FL (ref 5–10.5)
POTASSIUM REFLEX MAGNESIUM: 3.6 MMOL/L (ref 3.5–5.1)
PROTHROMBIN TIME: 14.5 SEC (ref 9.8–13)
RBC # BLD: 3.96 M/UL (ref 4–5.2)
SODIUM BLD-SCNC: 139 MMOL/L (ref 136–145)
WBC # BLD: 4.8 K/UL (ref 4–11)

## 2019-06-03 PROCEDURE — 99238 HOSP IP/OBS DSCHRG MGMT 30/<: CPT | Performed by: INTERNAL MEDICINE

## 2019-06-03 PROCEDURE — 2580000003 HC RX 258: Performed by: STUDENT IN AN ORGANIZED HEALTH CARE EDUCATION/TRAINING PROGRAM

## 2019-06-03 PROCEDURE — 36415 COLL VENOUS BLD VENIPUNCTURE: CPT

## 2019-06-03 PROCEDURE — 6360000002 HC RX W HCPCS: Performed by: INTERNAL MEDICINE

## 2019-06-03 PROCEDURE — 36569 INSJ PICC 5 YR+ W/O IMAGING: CPT

## 2019-06-03 PROCEDURE — 02HV33Z INSERTION OF INFUSION DEVICE INTO SUPERIOR VENA CAVA, PERCUTANEOUS APPROACH: ICD-10-PCS | Performed by: STUDENT IN AN ORGANIZED HEALTH CARE EDUCATION/TRAINING PROGRAM

## 2019-06-03 PROCEDURE — 85025 COMPLETE CBC W/AUTO DIFF WBC: CPT

## 2019-06-03 PROCEDURE — 6370000000 HC RX 637 (ALT 250 FOR IP): Performed by: STUDENT IN AN ORGANIZED HEALTH CARE EDUCATION/TRAINING PROGRAM

## 2019-06-03 PROCEDURE — 6360000002 HC RX W HCPCS: Performed by: STUDENT IN AN ORGANIZED HEALTH CARE EDUCATION/TRAINING PROGRAM

## 2019-06-03 PROCEDURE — C1751 CATH, INF, PER/CENT/MIDLINE: HCPCS

## 2019-06-03 PROCEDURE — 80048 BASIC METABOLIC PNL TOTAL CA: CPT

## 2019-06-03 PROCEDURE — 99232 SBSQ HOSP IP/OBS MODERATE 35: CPT | Performed by: INTERNAL MEDICINE

## 2019-06-03 PROCEDURE — 2580000003 HC RX 258: Performed by: INTERNAL MEDICINE

## 2019-06-03 PROCEDURE — 71045 X-RAY EXAM CHEST 1 VIEW: CPT

## 2019-06-03 PROCEDURE — 85610 PROTHROMBIN TIME: CPT

## 2019-06-03 RX ORDER — SODIUM CHLORIDE 0.9 % (FLUSH) 0.9 %
10 SYRINGE (ML) INJECTION EVERY 12 HOURS SCHEDULED
Status: DISCONTINUED | OUTPATIENT
Start: 2019-06-03 | End: 2019-06-03 | Stop reason: HOSPADM

## 2019-06-03 RX ORDER — LIDOCAINE HYDROCHLORIDE 10 MG/ML
5 INJECTION, SOLUTION EPIDURAL; INFILTRATION; INTRACAUDAL; PERINEURAL ONCE
Status: DISCONTINUED | OUTPATIENT
Start: 2019-06-03 | End: 2019-06-03 | Stop reason: HOSPADM

## 2019-06-03 RX ORDER — FLUTICASONE PROPIONATE 50 MCG
1 SPRAY, SUSPENSION (ML) NASAL DAILY
Qty: 1 BOTTLE | Refills: 3 | Status: SHIPPED | OUTPATIENT
Start: 2019-06-04 | End: 2020-03-31 | Stop reason: CLARIF

## 2019-06-03 RX ORDER — SODIUM CHLORIDE 0.9 % (FLUSH) 0.9 %
10 SYRINGE (ML) INJECTION PRN
Status: DISCONTINUED | OUTPATIENT
Start: 2019-06-03 | End: 2019-06-03 | Stop reason: HOSPADM

## 2019-06-03 RX ADMIN — FAMOTIDINE 20 MG: 20 TABLET ORAL at 09:24

## 2019-06-03 RX ADMIN — CETIRIZINE HYDROCHLORIDE 10 MG: 10 TABLET, FILM COATED ORAL at 09:24

## 2019-06-03 RX ADMIN — FLUTICASONE PROPIONATE 1 SPRAY: 50 SPRAY, METERED NASAL at 09:25

## 2019-06-03 RX ADMIN — Medication 10 ML: at 10:24

## 2019-06-03 RX ADMIN — PRAVASTATIN SODIUM 40 MG: 40 TABLET ORAL at 09:24

## 2019-06-03 RX ADMIN — ENOXAPARIN SODIUM 40 MG: 40 INJECTION SUBCUTANEOUS at 09:24

## 2019-06-03 RX ADMIN — ASPIRIN 81 MG 81 MG: 81 TABLET ORAL at 09:24

## 2019-06-03 RX ADMIN — CEFTRIAXONE SODIUM 2 G: 2 INJECTION, POWDER, FOR SOLUTION INTRAMUSCULAR; INTRAVENOUS at 17:15

## 2019-06-03 NOTE — CARE COORDINATION
Case Management Daily Note:    Current Plan of Care:     P- repeat CXR, consider azithromycin, add mucinex  Also consider US RUQ      ID  Consulted  : rec:   - Ceftriaxone total 4 weeks 6/25/2019  - Pending blood cultures If negative PICC line placed       PT AM-PAC: 21 /24  Per last eval on: 05/30/2019    OT AM-PAC: 24 /24 Per last eval on:05/30/2019    DME needs: NA       Discharge Plan: Home  W/  HHC  And  Infusion   [facility]  Contact:  Precious's number is [phone] if IV ABX are needed at home. [facility] [phone] has been made aware of possible home care needs with IV ABX for discharge. Tentative Discharge Date: 1-2 days    Current Barriers to Discharge: cx  Pending  , needs  PICC placed     Resources/Information given: info on [facility]        Case Management Notes: CM  Following for  D/c planning  ,  Will have  Home infusion  And [address]  . At  D/c     [facility]                        [id number] [place] [address] Drive. Brigette Mathis, [address] [facility]       Phone: [phone]       Fax: [phone]        BRENTON GQXPALVDJU DI [id number]                       [address] [facility] [address] [facility] , [address] [place] [address]       Phone: [phone]       Fax: [phone]              Farnaz Diaz RN  [facility] [employer]  Case Management Department  Ph: [phone]          UPDATE:  [facility]  By to see pt  ,  Follow up if D/C was today . Pt  Still  needing  US ,  She will be  Back tomorrow . Confirmed  Mid line  Was [placed  This afternoon.    Electronically signed by Farnaz Diaz RN on 6/3/2019 at 3:47 PM

## 2019-06-03 NOTE — PROGRESS NOTES
Discharge order received. Patient informed of discharge order. Discharge instructions reviewed with patient and son. Copy of discharge instructions given to patient. Signed prescriptions x 1 given to patient. Patient and son verbalized understanding, denies needs or questions at this time. IV and telemetry removed. All patient belongings packed and sent with patient upon discharge. Midline in place. Patient left in wheelchair via nursing staff to private vehicle for transportation to private residence.

## 2019-06-03 NOTE — PLAN OF CARE
Problem: Falls - Risk of:  Goal: Will remain free from falls  Outcome: Ongoing  Note:   See jimenez score. Patient ambulates with steady gait. Non skid slippers on. Encourage patient to call prior to ambulating. Patient verbalized understanding. Bed in low position . Bed brakes in use. Patient in bed with call light and personal belongings in reach. Bed alarm on. Will continue to monitor.

## 2019-06-03 NOTE — PROGRESS NOTES
cefTRIAXone (ROCEPHIN) IV  2 g Intravenous Q24H    aspirin  81 mg Oral Daily    pravastatin  40 mg Oral Daily    sodium chloride flush  10 mL Intravenous 2 times per day    famotidine  20 mg Oral BID    insulin lispro  0-6 Units Subcutaneous TID WC    insulin lispro  0-3 Units Subcutaneous Nightly       Continuous Infusions:   dextrose         PRN Meds:  sodium chloride flush, guaiFENesin-dextromethorphan, acetaminophen, sodium chloride flush, magnesium hydroxide, ondansetron, glucose, dextrose, glucagon (rDNA), dextrose, perflutren lipid microspheres      Assessment:     81 yo woman DM, CAD, CHF, AVR (7/2013)  Hx prior episodes of E coli bacteremia, last 3/2013  Hx diverticulosis, polyps - sees Dr Jeremias Loja, last colonoscopy 4 weeks ago (per pt)     Presents on 5/29 fever and chills. + urinary frequency - no dysuria. In ED Tm 103, WBC 5.2. UA neg, CXR neg.   BC   BC x2, Streptococcus salivarius   TTE done - BRAYAN with no vegetation (Nery 5/29/19)     IMP/  Streptococcal bacteremia   - oral / GI tract source   - given AVR, high risk for seeding valve and would give long course of treatment (despite negative BRAYAN)    Plan:     Ceftriaxone 2 gm iv daily x 4 weeks     Contacted Dr Jas Parker office 5/30 - let him know pt was here, obtain reports  See DANILO     Discussed with pt and daughter, son in low  Gayathri Garcia MD     INFUSION ORDERS:  Ceftriaxone 2 gm iv daily through 6/25  - First dose given in hospital  - Disposition / date discharge - home / [facility] 6/[facility]  - Check CBC w diff, CMP, ESR, CRP every Mon or Melum 64 result to [phone]  - Call with antibiotic / infusion issues, [phone]  - No f/u in outpatient ID office necessary

## 2019-06-03 NOTE — PROCEDURES
MIDLINE   PROCEDURE   NOTE      Chart reviewed for allergies, diagnosis, labs, known contraindications, reason for line placement and planned length of treatment. Insertion procedure discussed with patient/family member. Informed consent not required for Midline placement. Three patient identifiers - Patient name,   and MRN -  completed &  confirmed verbally. Hat, mask and eye shield donned. Midline site scrubbed with Chloraprep  One-Step applicator  for 30 seconds x 1. Hand Hygiene  performed with 3% Chlorhexidine surgical scrub x1 min prior to  Sterile gloves,   sterile gown being donned. Patient draped using maximal sterile barrier technique ( head to toe ). Midline site scrubbed a 2nd time with Chloraprep One-Step applicator x 30 sec.       22g, 10cm Power Glide Pro Midline inserted via  Rt. Brachial    Positive brisk blood return obtained Midline flushed with 10 mls  0.9% Sterile Sodium Chloride. Midline flushes easily with no resistance. Neutral pressure valve placed on all lumens followed by Alcohol Swab Caps on end of each. Skin prep applied to site. Bio-patch in place. Catheter secured with non-sutured locking device per hospital protocol with 0 cm of catheter showing beneath dressing. Sterile Tegaderm applied over Midline site. Sterile field maintained during procedure. Pt. Response to procedure Y. Appearance of site: Clean dry and intact without bleeding or edema. All edges of Tegaderm occlusive. Site marked with date and initials of RN placing line. Top 2 side rails in up position, call button in reach, RN notified of all of the above.

## 2019-06-03 NOTE — PROGRESS NOTES
Pt feels ok  Pt denies CP/SOB/palpitations/abdominal pain/N/V.    Cough is a little better (CXR showed atelectases Vs early infiltrate)  Cough and appetite are better  P- repeat CXR, consider azithromycin, add mucinex  Also consider US RUQ

## 2019-06-03 NOTE — PLAN OF CARE
Problem: Physical Regulation:  Goal: Ability to maintain vital signs within normal range will improve  Note:   VSS. Patient denies pain and or SOB. VSS . Non productive cough. Cough medication given. Monitoring daily labs. Will continue to monitor.

## 2019-06-03 NOTE — CARE COORDINATION
Case Management Discharge Assessment    6/3/2019  [facility])  Clinical Case Management Department    Patient: Swapna Kim  MRN: [id number] / : 1935  ACCT: [de-identified]          Admission Documentation  Attending Provider: Carolyn Reyez MD  Admit date/time: 2019  1:13 PM  Status: Inpatient [101]  Diagnosis: Fever     Readmission within last 30 days:  no     Living Situation  Discharge Planning  Living Arrangements: Alone  Support Systems: Children, Family Members  Potential Assistance Needed: N/A  Type of Home Care Services: None  Patient expects to be discharged to[de-identified] home  Expected Discharge Date: 19    Service Assessment:       Values / Beliefs  Do you have any ethnic, cultural, sacramental, or spiritual [facility] needs you would like us to be aware of while you are in the hospital?: No    Advance Directives (For Healthcare)  Pre-existing DNR Comfort Care/DNR Arrest/DNI Order: No  Healthcare Directive: Yes, patient has an advance directive for healthcare treatment  Type of Healthcare Directive: Living will  Copy in Chart: No, copy requested from family      Pharmacy: [facility] to [facility] Medications:  No  Does patient want to participate in local refill/meds to beds program?: Yes    Notification completed in HENS/PAS?  No     IMM  Yes       Discharge disposition: Home     LOC Home  W/  [address]                                    [address] [facility] [address] Cat Rivas, [address] [place] [address]        Phone: [phone]        Fax: [address]                                    [address] [facility] [address] [facility] , [address] [place] [address]         Phone: [phone]         Fax: [phone]         CM  Faxed  Home care  Orders  D/C summary  And  AVS/DANILO   CM  Spoke with Amy Soliz  In intake  At  [facility]  To confirm  Orders  Received  And  D/c hame  Today for [facility] tomorrow ,      Mode of Transport Family private car    [address] and her family were provided with choice of provider; she and her family are in agreement with the discharge plan. Care Transitions patient:  Yes    Bj Dominguez, REGINO  [facility]  Case Management Department  Ph: [phone]

## 2019-06-04 ENCOUNTER — TELEPHONE (OUTPATIENT)
Dept: INTERNAL MEDICINE CLINIC | Age: 84
End: 2019-06-04

## 2019-06-04 ENCOUNTER — CARE COORDINATION (OUTPATIENT)
Dept: CASE MANAGEMENT | Age: 84
End: 2019-06-04

## 2019-06-04 DIAGNOSIS — E78.00 HYPERCHOLESTEREMIA: Primary | ICD-10-CM

## 2019-06-04 PROCEDURE — 1111F DSCHRG MED/CURRENT MED MERGE: CPT

## 2019-06-04 RX ORDER — GUAIFENESIN 400 MG/1
400 TABLET ORAL DAILY
COMMUNITY
End: 2020-03-31 | Stop reason: CLARIF

## 2019-06-04 NOTE — TELEPHONE ENCOUNTER
[facility] and was told to schedule an appt with  within a week.

## 2019-06-04 NOTE — CARE COORDINATION
Latih 45 Transitions Initial Follow Up Call    Call within 2 business days of discharge: Yes    Patient: Glenys Root Patient : 1935   MRN: [id number]   Reason for Admission: fever - sepsis  Discharge Date: 6/3/19 RARS: Readmission Risk Score: 15      Last Discharge [facility]       Complaint Diagnosis Description Type Department Provider    19 Fever; Fatigue; Urinary Frequency Fever, unspecified fever cause ED to Hosp-Admission (Discharged) (ADMITTED) [facility] Indra Valerio MD; Kristina Wall. .. Spoke with: [address] [place] [address] Way: [facility] [employer].      Non-face-to-face services provided:  Obtained and reviewed discharge summary and/or continuity of care documents  Education of patient/family/caregiver/guardian to support self-management-   Assessment and support for treatment adherence and medication management-     Care Transitions 24 Hour Call    Do you have any ongoing symptoms?:  Yes  Patient-reported symptoms:  Cough  Interventions for patient-reported symptoms:  Other (Comment: Continue taking meds as prescribed, keep / schedule appts, s/s that require med attn)  Do you have a copy of your discharge instructions?:  Yes  Do you have all of your prescriptions and are they filled?:  Yes  Have you been contacted by a [facility] Pharmacist?:  No  Have you scheduled your follow up appointment?:  Yes  Were you discharged with any Home Care or Post Acute Services:  Yes  Post Acute Services:  Home Health (Comment: [facility] )  Do you feel like you have everything you need to keep you well at home?:  Yes  Care Transitions Interventions  No Identified Needs       Summary  CTC spoke to the Pt who denies fever, chills, nausea, vomiting, chest pain, SOB, and pain. Pt reports non-productive cough that is improving and states she started taking Mucinex last night. Pt reports LBM was yesterday.   Pt reports that PICC is in her right UA and denies that arm is red, hot-to-touch, swollen, or that she is having marti discomfort there. Pt and CTC reviewed meds and CTC completed the 1111F. Pt has appt with PCP on 6/13. Pt states she has an appt at 3 pm today with [facility] for them to drop of supplies and an appt with [facility] at 4pm.   CTC provided education on s/s that require medical attention and when to seek medical attention. CTC advised Pt of use urgent care or physicians 24 hr access line if assistance is needed after hours or on the weekend. Pt denies any needs or concerns and is agreeable with additional calls.     Follow Up  Future Appointments   Date Time Provider Lottie Morse   6/13/2019  4:30 PM Daphne Ahumada, MD [facility]   7/22/2019  9:00 AM Daphne Ahumada, MD [facility] Manley RN

## 2019-06-04 NOTE — TELEPHONE ENCOUNTER
[facility] Transitions Initial Follow Up Call    Outreach made within 2 business days of discharge: Yes    Patient: Jef Conn Patient : 1935   MRN: [id number]  Reason for Admission: There are no discharge diagnoses documented for the most recent discharge. Discharge Date: 6/3/19       Spoke with: Rosalinda    Discharge department/facility: [facility]    TCM Interactive Patient Contact:  Was patient able to fill all prescriptions: Yes  Was patient instructed to bring all medications to the follow-up visit: Yes  Is patient taking all medications as directed in the discharge summary?  Yes  Does patient understand their discharge instructions: Yes  Does patient have questions or concerns that need addressed prior to 7-14 day follow up office visit: no    Scheduled appointment with PCP within 7-14 days    Follow Up  Future Appointments   Date Time Provider Lottie Morse   2019  9:00 AM Candida Kitchen MD [facility] [address] [facility], [address] [place] [address]

## 2019-06-06 ENCOUNTER — CARE COORDINATION (OUTPATIENT)
Dept: CASE MANAGEMENT | Age: 84
End: 2019-06-06

## 2019-06-06 LAB
BLOOD CULTURE, ROUTINE: NORMAL
CULTURE, BLOOD 2: NORMAL

## 2019-06-06 NOTE — CARE COORDINATION
Laith 45 Transitions Follow Up Call    2019    Patient: Benoit Goldberg  Patient : 1935   MRN: [id number]   Reason for Admission:  Fever / sepsis   Discharge Date: 6/3/19 RARS: Readmission Risk Score: 15         Spoke with: [facility] Transitions Subsequent and Final Call    Subsequent and Final Calls  Do you have any ongoing symptoms?:  Yes  Patient-reported symptoms:  Cough, Other  Interventions for patient-reported symptoms:  Other  Have your medications changed?:  No  Do you have any questions related to your medications?:  No  Do you currently have any active services?:  Yes  Are you currently active with any services?:  Home Health  Do you have any needs or concerns that I can assist you with?:  No  Identified Barriers:  None  Care Transitions Interventions  No Identified Needs  Other Interventions:          Summary  CTC spoke to the Pt who denies fever, chills, nausea, vomiting, chest pain, SOB, and pain. Pt reports LBM was yesterday, having \"little energy\", and that cough is getting much better. Pt states that PICC in right upper arm is not fine and not having any issues with it. Pt states that Jacki Curry nurse saw her for two days and watched her administer her own antibiotics. Pt states she is now administering them on her own and Jacki Curry worker will return on Monday to complete lab work. [facility] provided education on s/s that require medical attention and when to seek medical attention. CTC advised Pt of use urgent care or physicians 24 hr access line if assistance is needed after hours or on the weekend. Pt denies any needs or concerns and is agreeable with additional calls.     Follow Up  Future Appointments   Date Time Provider Lottie Morse   2019  4:30 PM Abundio Schlatter, MD [facility]   2019  9:00 AM Abundio Schlatter, MD [facility]       Rajendra Zamora RN

## 2019-06-10 ENCOUNTER — TELEPHONE (OUTPATIENT)
Dept: INTERNAL MEDICINE CLINIC | Age: 84
End: 2019-06-10

## 2019-06-10 LAB
ALBUMIN SERPL-MCNC: 3.5 G/DL
ALP BLD-CCNC: 69 U/L
ALT SERPL-CCNC: 33 U/L
ANION GAP SERPL CALCULATED.3IONS-SCNC: 12 MMOL/L
AST SERPL-CCNC: 60 U/L
BILIRUB SERPL-MCNC: 1.3 MG/DL (ref 0.1–1.4)
BUN BLDV-MCNC: 10 MG/DL
C-REACTIVE PROTEIN: 3.9
CALCIUM SERPL-MCNC: 8.9 MG/DL
CHLORIDE BLD-SCNC: 103 MMOL/L
CO2: 22 MMOL/L
CREAT SERPL-MCNC: 0.55 MG/DL
GFR CALCULATED: NORMAL
GLUCOSE BLD-MCNC: 60 MG/DL
HCT VFR BLD CALC: 40.9 % (ref 36–46)
HEMOGLOBIN: 13.7 G/DL (ref 12–16)
PLATELET # BLD: 112 K/ΜL
POTASSIUM SERPL-SCNC: 4 MMOL/L
SEDIMENTATION RATE, ERYTHROCYTE: 51
SODIUM BLD-SCNC: 137 MMOL/L
TOTAL PROTEIN: 7
WBC # BLD: 5.7 10^3/ML

## 2019-06-10 NOTE — TELEPHONE ENCOUNTER
If she is lightheaded would stop amlodipine until blood pressure improves. If not lightheaded would monitor blood pressure since it improved, call if worsening.

## 2019-06-10 NOTE — TELEPHONE ENCOUNTER
Patient;'s blood pressure was 103//33 and she repeatedand it was 130/32. Patient has also had diarrhea 3 times today.      Nieves Lobato

## 2019-06-10 NOTE — TELEPHONE ENCOUNTER
Pt took BP again this afternoon it was 131/55 p 65. Pt states she does not feel dizzy at all. She will call if she gets any symptoms.

## 2019-06-11 ENCOUNTER — CARE COORDINATION (OUTPATIENT)
Dept: CASE MANAGEMENT | Age: 84
End: 2019-06-11

## 2019-06-13 ENCOUNTER — OFFICE VISIT (OUTPATIENT)
Dept: INTERNAL MEDICINE CLINIC | Age: 84
End: 2019-06-13
Payer: MEDICARE

## 2019-06-13 VITALS
WEIGHT: 169 LBS | HEART RATE: 67 BPM | BODY MASS INDEX: 29.01 KG/M2 | OXYGEN SATURATION: 96 % | SYSTOLIC BLOOD PRESSURE: 128 MMHG | DIASTOLIC BLOOD PRESSURE: 50 MMHG

## 2019-06-13 DIAGNOSIS — I10 ESSENTIAL HYPERTENSION, BENIGN: ICD-10-CM

## 2019-06-13 DIAGNOSIS — K82.8 THICKENING OF WALL OF GALLBLADDER WITH PERICHOLECYSTIC FLUID: Primary | ICD-10-CM

## 2019-06-13 DIAGNOSIS — R78.81 BACTEREMIA: ICD-10-CM

## 2019-06-13 PROCEDURE — 99214 OFFICE O/P EST MOD 30 MIN: CPT | Performed by: NURSE PRACTITIONER

## 2019-06-13 ASSESSMENT — ENCOUNTER SYMPTOMS
DIARRHEA: 0
ABDOMINAL PAIN: 0
COLOR CHANGE: 0
BACK PAIN: 0
SHORTNESS OF BREATH: 0
WHEEZING: 0
CONSTIPATION: 0
SINUS PAIN: 0
SINUS PRESSURE: 0
COUGH: 0

## 2019-06-13 NOTE — PROGRESS NOTES
ACCU-CHEK BERNABE PLUS strip  TEST BLOOD SUGAR ONE TIME A DAY OR AS DIRECTED BY PHYSICIAN.             amLODIPine (NORVASC) 2.5 MG tablet  Take 1 tablet by mouth daily             aspirin 81 MG tablet  Take 81 mg by mouth daily.              cetirizine (ZYRTEC) 10 MG tablet  Take 10 mg by mouth daily as needed              fluticasone (FLONASE) 50 MCG/ACT nasal spray  1 spray by Each Nostril route daily             guaiFENesin 400 MG tablet  Take 400 mg by mouth Daily             Liraglutide (VICTOZA) 18 MG/3ML SOPN SC injection  Inject 0.6 mg into the skin daily DIAL AND INJECT SUBCUTANEOUSLY 0.6 MG DAILY             metoprolol tartrate (LOPRESSOR) 25 MG tablet  Take 0.5 tablets by mouth daily             Multiple Vitamins-Minerals (ONE-A-DAY WOMENS 50 PLUS PO)  Take 1 capsule by mouth daily             pantoprazole (PROTONIX) 40 MG tablet  Take 40 mg by mouth daily             pravastatin (PRAVACHOL) 40 MG tablet  Take 1 tablet by mouth daily             vitamin D (ERGOCALCIFEROL) 98335 units CAPS capsule  Take 1 capsule by mouth once a week                   Medications marked \"taking\" at this time  Outpatient Medications Marked as Taking for the 6/13/19 encounter (Office Visit) with FELICIA Mann CNP   Medication Sig Dispense Refill    guaiFENesin 400 MG tablet Take 400 mg by mouth Daily      fluticasone (FLONASE) 50 MCG/ACT nasal spray 1 spray by Each Nostril route daily 1 Bottle 3    pantoprazole (PROTONIX) 40 MG tablet Take 40 mg by mouth daily      pravastatin (PRAVACHOL) 40 MG tablet Take 1 tablet by mouth daily 90 tablet 1    metoprolol tartrate (LOPRESSOR) 25 MG tablet Take 0.5 tablets by mouth daily 45 tablet 0    vitamin D (ERGOCALCIFEROL) 26192 units CAPS capsule Take 1 capsule by mouth once a week 4 capsule 5    Multiple Vitamins-Minerals (ONE-A-DAY WOMENS 50 PLUS PO) Take 1 capsule by mouth daily      cetirizine (ZYRTEC) 10 MG tablet Take 10 mg by mouth daily as needed  Liraglutide (VICTOZA) 18 MG/3ML SOPN SC injection Inject 0.6 mg into the skin daily DIAL AND INJECT SUBCUTANEOUSLY 0.6 MG DAILY 18 pen 2    ACCU-CHEK BERNABE PLUS strip TEST BLOOD SUGAR ONE TIME A DAY OR AS DIRECTED BY PHYSICIAN. 100 each 3    aspirin 81 MG tablet Take 81 mg by mouth daily. Medications patient taking as of now reconciled against medications ordered at time of hospital discharge: Yes    Chief Complaint   Patient presents with    Pre-op Exam     blood infection       HPI    Inpatient course: Discharge summary reviewed- see chart. Interval history/Current status: Louisa Cnonors is feeling well since her discharge. She continues to follow with home health care. She is doing well with infusing her antibiotics into her port. She monitors her blood pressure, HR, and temperature daily. She did have one low blood pressure 103/33. She denies dizziness, fatigue, or syncope. She has a BP report with her today, all other blood pressures are normal.     She states that she has not been taking her metoprolol for the past 6 months, however she was given this while in the hospital. She is unsure if she should be taking it. Review of Systems   Constitutional: Negative for chills, fatigue and fever. HENT: Negative for congestion, sinus pressure and sinus pain. Respiratory: Negative for cough, shortness of breath and wheezing. Cardiovascular: Negative for chest pain and palpitations. Gastrointestinal: Negative for abdominal pain, constipation and diarrhea. Musculoskeletal: Negative for arthralgias, back pain and myalgias. Skin: Negative for color change, pallor and rash. Port a cath in place, right upper arm    Neurological: Negative for dizziness, syncope, weakness, light-headedness and headaches. Psychiatric/Behavioral: Negative for behavioral problems, confusion and sleep disturbance. The patient is not nervous/anxious.         Vitals:    06/13/19 1123   BP: (!) 128/50   Site: Left

## 2019-06-13 NOTE — ASSESSMENT & PLAN NOTE
BP well controlled   Continue to monitor and report consistent highs or lows  No CP, dizziness, palpations, or syncope   Reach out to Dr. Colette Montgomery to clarify metoprolol order

## 2019-06-13 NOTE — ASSESSMENT & PLAN NOTE
Doing well with IV antibiotics   Home care drawing blood weekly and changing dressing to port   Denies fevers

## 2019-06-17 LAB
ALBUMIN SERPL-MCNC: 3.5 G/DL
ALP BLD-CCNC: 69 U/L
ALT SERPL-CCNC: 33 U/L
ANION GAP SERPL CALCULATED.3IONS-SCNC: 13 MMOL/L
AST SERPL-CCNC: 59 U/L
BILIRUB SERPL-MCNC: 0.9 MG/DL (ref 0.1–1.4)
BUN BLDV-MCNC: 11 MG/DL
C-REACTIVE PROTEIN: 4
CALCIUM SERPL-MCNC: 9.2 MG/DL
CHLORIDE BLD-SCNC: 101 MMOL/L
CO2: 22 MMOL/L
CREAT SERPL-MCNC: 0.5 MG/DL
GFR CALCULATED: NORMAL
GLUCOSE BLD-MCNC: 134 MG/DL
HCT VFR BLD CALC: 38.5 % (ref 36–46)
HEMOGLOBIN: 13.4 G/DL (ref 12–16)
PLATELET # BLD: 96 K/ΜL
POTASSIUM SERPL-SCNC: 3.8 MMOL/L
SEDIMENTATION RATE, ERYTHROCYTE: 76
SODIUM BLD-SCNC: 136 MMOL/L
TOTAL PROTEIN: 6.6
WBC # BLD: 4.4 10^3/ML

## 2019-06-18 ENCOUNTER — CARE COORDINATION (OUTPATIENT)
Dept: CASE MANAGEMENT | Age: 84
End: 2019-06-18

## 2019-06-18 NOTE — CARE COORDINATION
on s/s that require medical attention and when to seek medical attention. CTC advised Pt of use urgent care or physicians 24 hr access line if assistance is needed after hours or on the weekend. Pt denies any needs or concerns and CT calls have concluded.      Follow Up  Future Appointments   Date Time Provider Lottie Morse   6/27/2019 10:30 AM [address] [facility]   7/22/2019  9:00 AM Jean-Paul Manrique MD [facility]       Omega Avila RN

## 2019-06-19 ENCOUNTER — TELEPHONE (OUTPATIENT)
Dept: INFECTIOUS DISEASES | Age: 84
End: 2019-06-19

## 2019-06-19 NOTE — TELEPHONE ENCOUNTER
Spoke with pt - tolerating IV medication well. Pt reports she is doing great. No redness, swelling or pain noted at PICC line site.

## 2019-06-24 LAB
ALBUMIN SERPL-MCNC: 3.6 G/DL
ALP BLD-CCNC: 64 U/L
ALT SERPL-CCNC: 29 U/L
ANION GAP SERPL CALCULATED.3IONS-SCNC: 15 MMOL/L
AST SERPL-CCNC: 49 U/L
BILIRUB SERPL-MCNC: 1.2 MG/DL (ref 0.1–1.4)
BUN BLDV-MCNC: 12 MG/DL
C-REACTIVE PROTEIN: 2.4
CALCIUM SERPL-MCNC: 9.1 MG/DL
CHLORIDE BLD-SCNC: 101 MMOL/L
CO2: 21 MMOL/L
CREAT SERPL-MCNC: 0.54 MG/DL
GFR CALCULATED: NORMAL
GLUCOSE BLD-MCNC: 66 MG/DL
HCT VFR BLD CALC: 38.3 % (ref 36–46)
HEMOGLOBIN: 13.1 G/DL (ref 12–16)
PLATELET # BLD: 88 K/ΜL
POTASSIUM SERPL-SCNC: 3.6 MMOL/L
SEDIMENTATION RATE, ERYTHROCYTE: 63
SODIUM BLD-SCNC: 137 MMOL/L
TOTAL PROTEIN: 6.6
WBC # BLD: 4.5 10^3/ML

## 2019-06-27 ENCOUNTER — HOSPITAL ENCOUNTER (OUTPATIENT)
Dept: ULTRASOUND IMAGING | Age: 84
Discharge: HOME | End: 2019-06-27
Payer: MEDICARE

## 2019-06-27 DIAGNOSIS — K82.8 THICKENING OF WALL OF GALLBLADDER WITH PERICHOLECYSTIC FLUID: ICD-10-CM

## 2019-06-27 PROCEDURE — 76705 ECHO EXAM OF ABDOMEN: CPT

## 2019-08-07 DIAGNOSIS — E11.9 TYPE 2 DIABETES MELLITUS WITHOUT COMPLICATION, WITHOUT LONG-TERM CURRENT USE OF INSULIN (HCC): Primary | ICD-10-CM

## 2019-08-09 DIAGNOSIS — I10 ESSENTIAL HYPERTENSION, BENIGN: ICD-10-CM

## 2019-08-30 ENCOUNTER — OFFICE VISIT (OUTPATIENT)
Dept: INTERNAL MEDICINE CLINIC | Age: 84
End: 2019-08-30
Payer: MEDICARE

## 2019-08-30 VITALS
SYSTOLIC BLOOD PRESSURE: 138 MMHG | OXYGEN SATURATION: 97 % | DIASTOLIC BLOOD PRESSURE: 54 MMHG | BODY MASS INDEX: 28.32 KG/M2 | HEART RATE: 67 BPM | WEIGHT: 165 LBS

## 2019-08-30 DIAGNOSIS — J45.20 MILD INTERMITTENT ASTHMA WITHOUT COMPLICATION: ICD-10-CM

## 2019-08-30 DIAGNOSIS — I10 ESSENTIAL HYPERTENSION, BENIGN: Primary | ICD-10-CM

## 2019-08-30 DIAGNOSIS — E55.9 VITAMIN D DEFICIENCY: ICD-10-CM

## 2019-08-30 DIAGNOSIS — I25.10 ATHEROSCLEROSIS OF CORONARY ARTERY OF NATIVE HEART WITHOUT ANGINA PECTORIS, UNSPECIFIED VESSEL OR LESION TYPE: ICD-10-CM

## 2019-08-30 DIAGNOSIS — E78.00 HYPERCHOLESTEREMIA: ICD-10-CM

## 2019-08-30 PROCEDURE — 99213 OFFICE O/P EST LOW 20 MIN: CPT | Performed by: INTERNAL MEDICINE

## 2019-08-30 RX ORDER — ERGOCALCIFEROL 1.25 MG/1
50000 CAPSULE ORAL WEEKLY
Qty: 12 CAPSULE | Refills: 3 | Status: SHIPPED
Start: 2019-08-30 | End: 2020-03-31 | Stop reason: CLARIF

## 2019-08-30 ASSESSMENT — ENCOUNTER SYMPTOMS
BLOOD IN STOOL: 0
DIARRHEA: 0
ABDOMINAL PAIN: 0
CHEST TIGHTNESS: 0
VOMITING: 0
CONSTIPATION: 0
SHORTNESS OF BREATH: 0
NAUSEA: 0

## 2019-08-30 NOTE — PROGRESS NOTES
provider for any worsening or lack    of improvement in his symptoms.       - Pt was asked toschedule an appointment in 3 months, and to let me know of any scheduling difficulties. Additional patients instructions (if given): There are no Patient Instructions on file for this visit.     Negrita Alvarado M.D.   8/30/2019, 5:55 PM

## 2019-09-11 RX ORDER — AMLODIPINE BESYLATE 2.5 MG/1
TABLET ORAL
Qty: 30 TABLET | Refills: 4 | Status: SHIPPED | OUTPATIENT
Start: 2019-09-11 | End: 2020-02-18

## 2019-09-16 ENCOUNTER — CARE COORDINATION (OUTPATIENT)
Dept: CARE COORDINATION | Age: 84
End: 2019-09-16

## 2019-09-16 NOTE — LETTER
9/16/2019    Yosef Barrientos  [facility]  [facility] [address]          Dear Yosef Barrientos,    My name is Forest Warren and I am a registered nurse who partners with Peace Somers MD to improve patients' health. Peace Somers MD believes you would benefit from working with me. As a member of your health care team, I would work with other providers involved in your care, offer education for your specific health conditions, and connect you with additional resources as needed. I will collaborate with Peace Somers MD to support you in following your treatment plan. The additional support I provide is no additional cost to you. My primary focus is to help you achieve specific goals and improve your health. We are committed to walk with you on this journey and look forward to working with you. Please call me to further discuss your healthcare needs. I am available by phone or for appointments at the office. You can reach me at [phone].         In good health,           Forest Warren RN [facility] Manager  Office # [phone]  Cell # [phone]

## 2019-09-23 ENCOUNTER — CARE COORDINATION (OUTPATIENT)
Dept: CARE COORDINATION | Age: 84
End: 2019-09-23

## 2019-09-24 ENCOUNTER — CARE COORDINATION (OUTPATIENT)
Dept: CARE COORDINATION | Age: 84
End: 2019-09-24

## 2019-09-24 NOTE — CARE COORDINATION
education  Plan for overcoming my barriers: Work with ACM and possibly LEONOR ALICEA  Confidence: 7/10  Anticipated Goal Completion Date: 12/23/19            Prior to Admission medications    Medication Sig Start Date End Date Taking? Authorizing Provider   amLODIPine (NORVASC) 2.5 MG tablet TAKE ONE TABLET BY MOUTH DAILY 9/11/19   Arelis Whitehead MD   vitamin D (ERGOCALCIFEROL) 43374 units CAPS capsule Take 1 capsule by mouth once a week 8/30/19   Arelis Whitehead MD   blood glucose test strips (ONE TOUCH ULTRA TEST) strip 1 each by In Vitro route 2 times daily As needed. 8/7/19   Arelis Whitehead MD   guaiFENesin 400 MG tablet Take 400 mg by mouth Daily    Historical Provider, MD   fluticasone (FLONASE) 50 MCG/ACT nasal spray 1 spray by Each Nostril route daily 6/4/19   Emilia Rockwell MD   pantoprazole (PROTONIX) 40 MG tablet Take 40 mg by mouth daily    Historical Provider, MD   pravastatin (PRAVACHOL) 40 MG tablet Take 1 tablet by mouth daily 5/21/19   Arelis Whitehead MD   Multiple Vitamins-Minerals (ONE-A-DAY WOMENS 50 PLUS PO) Take 1 capsule by mouth daily    Historical Provider, MD   cetirizine (ZYRTEC) 10 MG tablet Take 10 mg by mouth daily as needed     Historical Provider, MD   Liraglutide (VICTOZA) 18 MG/3ML SOPN SC injection Inject 0.6 mg into the skin daily DIAL AND INJECT SUBCUTANEOUSLY 0.6 MG DAILY 8/21/18   Arelis Whitehead MD   ACCU-CHEK BERNABE PLUS strip TEST BLOOD SUGAR ONE TIME A DAY OR AS DIRECTED BY PHYSICIAN. 5/20/15   Becky Galvan MD   aspirin 81 MG tablet Take 81 mg by mouth daily.     Historical Provider, MD       Future Appointments   Date Time Provider Lottie Morse   12/6/2019  9:30 AM Arelis Whitehead MD [facility]     ,   Diabetes Assessment    Meal Planning:  Avoidance of concentrated sweets, Other   How often do you test your blood sugar?:  Other (Comment: FBS's)   Do you have barriers with adherence to non-pharmacologic self-management

## 2019-10-04 ENCOUNTER — CARE COORDINATION (OUTPATIENT)
Dept: CARE COORDINATION | Age: 84
End: 2019-10-04

## 2019-11-04 ENCOUNTER — CARE COORDINATION (OUTPATIENT)
Dept: CARE COORDINATION | Age: 84
End: 2019-11-04

## 2019-11-13 ENCOUNTER — CARE COORDINATION (OUTPATIENT)
Dept: CARE COORDINATION | Age: 84
End: 2019-11-13

## 2019-12-06 ENCOUNTER — OFFICE VISIT (OUTPATIENT)
Dept: INTERNAL MEDICINE CLINIC | Age: 84
End: 2019-12-06
Payer: MEDICARE

## 2019-12-06 VITALS
HEART RATE: 62 BPM | DIASTOLIC BLOOD PRESSURE: 52 MMHG | BODY MASS INDEX: 27.12 KG/M2 | WEIGHT: 158 LBS | SYSTOLIC BLOOD PRESSURE: 152 MMHG | OXYGEN SATURATION: 96 %

## 2019-12-06 DIAGNOSIS — E11.9 TYPE 2 DIABETES MELLITUS WITHOUT COMPLICATION, WITHOUT LONG-TERM CURRENT USE OF INSULIN (HCC): ICD-10-CM

## 2019-12-06 DIAGNOSIS — E78.00 HYPERCHOLESTEREMIA: ICD-10-CM

## 2019-12-06 DIAGNOSIS — Z23 NEED FOR INFLUENZA VACCINATION: ICD-10-CM

## 2019-12-06 DIAGNOSIS — G45.8 OTHER SPECIFIED TRANSIENT CEREBRAL ISCHEMIAS: ICD-10-CM

## 2019-12-06 DIAGNOSIS — D69.6 THROMBOCYTOPENIA (HCC): Chronic | ICD-10-CM

## 2019-12-06 DIAGNOSIS — Z95.2 S/P AVR (AORTIC VALVE REPLACEMENT): ICD-10-CM

## 2019-12-06 DIAGNOSIS — I10 ESSENTIAL HYPERTENSION, BENIGN: ICD-10-CM

## 2019-12-06 DIAGNOSIS — I10 ESSENTIAL HYPERTENSION, BENIGN: Primary | ICD-10-CM

## 2019-12-06 LAB
A/G RATIO: 1.4 (ref 1.1–2.2)
ALBUMIN SERPL-MCNC: 4.2 G/DL (ref 3.4–5)
ALP BLD-CCNC: 57 U/L (ref 40–129)
ALT SERPL-CCNC: 18 U/L (ref 10–40)
ANION GAP SERPL CALCULATED.3IONS-SCNC: 14 MMOL/L (ref 3–16)
AST SERPL-CCNC: 41 U/L (ref 15–37)
BASOPHILS ABSOLUTE: 0.1 K/UL (ref 0–0.2)
BASOPHILS RELATIVE PERCENT: 1 %
BILIRUB SERPL-MCNC: 1.5 MG/DL (ref 0–1)
BUN BLDV-MCNC: 11 MG/DL (ref 7–20)
CALCIUM SERPL-MCNC: 9.7 MG/DL (ref 8.3–10.6)
CHLORIDE BLD-SCNC: 103 MMOL/L (ref 99–110)
CHOLESTEROL, TOTAL: 150 MG/DL (ref 0–199)
CO2: 25 MMOL/L (ref 21–32)
CREAT SERPL-MCNC: 0.7 MG/DL (ref 0.6–1.2)
EOSINOPHILS ABSOLUTE: 0.2 K/UL (ref 0–0.6)
EOSINOPHILS RELATIVE PERCENT: 3.6 %
GFR AFRICAN AMERICAN: >60
GFR NON-AFRICAN AMERICAN: >60
GLOBULIN: 3.1 G/DL
GLUCOSE BLD-MCNC: 91 MG/DL (ref 70–99)
HCT VFR BLD CALC: 45.2 % (ref 36–48)
HDLC SERPL-MCNC: 49 MG/DL (ref 40–60)
HEMOGLOBIN: 15.1 G/DL (ref 12–16)
LDL CHOLESTEROL CALCULATED: 76 MG/DL
LYMPHOCYTES ABSOLUTE: 1.2 K/UL (ref 1–5.1)
LYMPHOCYTES RELATIVE PERCENT: 22.7 %
MCH RBC QN AUTO: 31 PG (ref 26–34)
MCHC RBC AUTO-ENTMCNC: 33.4 G/DL (ref 31–36)
MCV RBC AUTO: 92.9 FL (ref 80–100)
MONOCYTES ABSOLUTE: 0.4 K/UL (ref 0–1.3)
MONOCYTES RELATIVE PERCENT: 7.8 %
NEUTROPHILS ABSOLUTE: 3.5 K/UL (ref 1.7–7.7)
NEUTROPHILS RELATIVE PERCENT: 64.9 %
PDW BLD-RTO: 14.6 % (ref 12.4–15.4)
PLATELET # BLD: 88 K/UL (ref 135–450)
PMV BLD AUTO: 10.5 FL (ref 5–10.5)
POTASSIUM SERPL-SCNC: 4.5 MMOL/L (ref 3.5–5.1)
RBC # BLD: 4.86 M/UL (ref 4–5.2)
SODIUM BLD-SCNC: 142 MMOL/L (ref 136–145)
TOTAL PROTEIN: 7.3 G/DL (ref 6.4–8.2)
TRIGL SERPL-MCNC: 124 MG/DL (ref 0–150)
VLDLC SERPL CALC-MCNC: 25 MG/DL
WBC # BLD: 5.5 K/UL (ref 4–11)

## 2019-12-06 PROCEDURE — 90653 IIV ADJUVANT VACCINE IM: CPT | Performed by: INTERNAL MEDICINE

## 2019-12-06 PROCEDURE — G0008 ADMIN INFLUENZA VIRUS VAC: HCPCS | Performed by: INTERNAL MEDICINE

## 2019-12-06 PROCEDURE — 99214 OFFICE O/P EST MOD 30 MIN: CPT | Performed by: INTERNAL MEDICINE

## 2019-12-06 ASSESSMENT — ENCOUNTER SYMPTOMS
ABDOMINAL PAIN: 0
VOMITING: 0
CHEST TIGHTNESS: 0
NAUSEA: 0
SHORTNESS OF BREATH: 0

## 2019-12-07 LAB
ESTIMATED AVERAGE GLUCOSE: 108.3 MG/DL
HBA1C MFR BLD: 5.4 %

## 2019-12-10 DIAGNOSIS — E78.00 HYPERCHOLESTEREMIA: ICD-10-CM

## 2019-12-10 RX ORDER — PRAVASTATIN SODIUM 40 MG
TABLET ORAL
Qty: 33 TABLET | Refills: 0 | Status: SHIPPED
Start: 2019-12-10 | End: 2020-02-20 | Stop reason: SDUPTHER

## 2019-12-11 ENCOUNTER — TELEPHONE (OUTPATIENT)
Dept: INTERNAL MEDICINE CLINIC | Age: 84
End: 2019-12-11

## 2019-12-11 DIAGNOSIS — R74.8 ELEVATED LIVER ENZYMES: ICD-10-CM

## 2019-12-11 DIAGNOSIS — D69.6 THROMBOCYTOPENIA (HCC): Primary | Chronic | ICD-10-CM

## 2019-12-12 ENCOUNTER — CARE COORDINATION (OUTPATIENT)
Dept: CARE COORDINATION | Age: 84
End: 2019-12-12

## 2019-12-26 RX ORDER — PANTOPRAZOLE SODIUM 40 MG/1
TABLET, DELAYED RELEASE ORAL
Qty: 90 TABLET | Refills: 4 | Status: SHIPPED | OUTPATIENT
Start: 2019-12-26 | End: 2021-04-16 | Stop reason: CLARIF

## 2020-01-02 ENCOUNTER — CARE COORDINATION (OUTPATIENT)
Dept: CARE COORDINATION | Age: 85
End: 2020-01-02

## 2020-01-02 NOTE — CARE COORDINATION
Ambulatory Care Coordination Note  1/2/2020  CM Risk Score: 7  Charlson 10 Year Mortality Risk Score: 98%     ACC: Lilo Nassar, REGINO    Summary Note: Call to patient to assess ongoing Care Coordination needs. Pt states she has the flu. Started \"with sinus because with change of weather I get all stuffed up\". Granddaughter present over holidays with flu as well has the mother, the pts daughter. Not sleeping well until last night as she started taking Mucinex Sinus Max D, using a humidifier and using nasal saline spray, denies fever, at times achiness, feels she is a little better but sore throat has started with ear pain radiating. Was coughing clear mucous, that has subsided for the most part. Doesn't feel she is shaking this as quickly as she should. Pt agreeable to appt with office today or tomorrow. Pt drives. ACM will contact office for availability. Plan:  Attempt to obtain appt for pt and call her back. Care Coordination Interventions    Program Enrollment:  Complex Care  Referral from Primary Care Provider:  No  Suggested Interventions and Community Resources  Diabetes Education: In Process  Fall Risk Prevention: In Process  Pharmacist:  Declined  Zone Management Tools: In Process (Comment: Diabetes zones mailed to pt)  Other Services or Interventions:  Adjusting to Life with Type 2 Diabetes mailed to pt         Goals Addressed    None         Prior to Admission medications    Medication Sig Start Date End Date Taking?  Authorizing Provider   pantoprazole (PROTONIX) 40 MG tablet TAKE ONE TABLET BY MOUTH DAILY 12/26/19   Meaghan Catalan MD   pravastatin (PRAVACHOL) 40 MG tablet TAKE ONE TABLET BY MOUTH DAILY 12/10/19   Meaghan Catalan MD   Nutritional Supplements (GLUCERNA ADVANCE SHAKE PO) Take 1 each by mouth daily    Historical Provider, MD   Liraglutide (VICTOZA) 18 MG/3ML SOPN SC injection Inject 0.6 mg into the skin daily DIAL AND INJECT SUBCUTANEOUSLY 0.6 MG DAILY 12/6/19   Javier Perera MD   amLODIPine (NORVASC) 2.5 MG tablet TAKE ONE TABLET BY MOUTH DAILY 9/11/19   Javier Perera MD   vitamin D (ERGOCALCIFEROL) 26392 units CAPS capsule Take 1 capsule by mouth once a week 8/30/19   Javier Perera MD   blood glucose test strips (ONE TOUCH ULTRA TEST) strip 1 each by In Vitro route 2 times daily As needed. 8/7/19   Javier Perera MD   guaiFENesin 400 MG tablet Take 400 mg by mouth Daily    Historical Provider, MD   fluticasone (FLONASE) 50 MCG/ACT nasal spray 1 spray by Each Nostril route daily 6/4/19   Paul Escobar MD   pravastatin (PRAVACHOL) 40 MG tablet Take 1 tablet by mouth daily 5/21/19   Javier Perera MD   Multiple Vitamins-Minerals (ONE-A-DAY WOMENS 50 PLUS PO) Take 1 capsule by mouth daily    Historical Provider, MD   cetirizine (ZYRTEC) 10 MG tablet Take 10 mg by mouth daily as needed     Historical Provider, MD   [address] [place] [address] strip TEST BLOOD SUGAR ONE TIME A DAY OR AS DIRECTED BY PHYSICIAN. 5/20/15   Osker Collet, MD   aspirin 81 MG tablet Take 81 mg by mouth daily.     Historical Provider, MD       Future Appointments   Date Time Provider Lottie Morse   6/8/2020  9:00 AM [facility]

## 2020-01-03 ENCOUNTER — OFFICE VISIT (OUTPATIENT)
Dept: FAMILY MEDICINE CLINIC | Age: 85
End: 2020-01-03
Payer: MEDICARE

## 2020-01-03 VITALS
TEMPERATURE: 97.8 F | DIASTOLIC BLOOD PRESSURE: 58 MMHG | BODY MASS INDEX: 27.7 KG/M2 | OXYGEN SATURATION: 96 % | SYSTOLIC BLOOD PRESSURE: 130 MMHG | WEIGHT: 161.4 LBS | HEART RATE: 83 BPM

## 2020-01-03 PROBLEM — J01.90 ACUTE BACTERIAL SINUSITIS: Status: ACTIVE | Noted: 2020-01-03

## 2020-01-03 PROBLEM — R50.9 FEVER: Status: RESOLVED | Noted: 2019-05-29 | Resolved: 2020-01-03

## 2020-01-03 PROBLEM — R78.81 BACTEREMIA: Status: RESOLVED | Noted: 2019-05-31 | Resolved: 2020-01-03

## 2020-01-03 PROBLEM — R42 DIZZINESS: Status: RESOLVED | Noted: 2019-01-06 | Resolved: 2020-01-03

## 2020-01-03 PROBLEM — K82.8 THICKENING OF WALL OF GALLBLADDER WITH PERICHOLECYSTIC FLUID: Status: RESOLVED | Noted: 2019-06-13 | Resolved: 2020-01-03

## 2020-01-03 PROBLEM — D69.6 THROMBOCYTOPENIA (HCC): Chronic | Status: RESOLVED | Noted: 2019-01-05 | Resolved: 2020-01-03

## 2020-01-03 PROBLEM — B96.89 ACUTE BACTERIAL SINUSITIS: Status: ACTIVE | Noted: 2020-01-03

## 2020-01-03 PROCEDURE — 99214 OFFICE O/P EST MOD 30 MIN: CPT | Performed by: NURSE PRACTITIONER

## 2020-01-03 RX ORDER — DOXYCYCLINE HYCLATE 100 MG
100 TABLET ORAL 2 TIMES DAILY
Qty: 14 TABLET | Refills: 0 | Status: SHIPPED | OUTPATIENT
Start: 2020-01-03 | End: 2020-01-10

## 2020-01-03 ASSESSMENT — ENCOUNTER SYMPTOMS
COUGH: 1
SINUS PRESSURE: 1
RHINORRHEA: 1

## 2020-01-03 NOTE — ASSESSMENT & PLAN NOTE
Patient has stopped her diabetes medications. She states her fasting sugars run between 111 and 126.

## 2020-01-03 NOTE — PROGRESS NOTES
diabetes mellitus without mention of complication, not stated as uncontrolled     Unspecified asthma(493.90)     seasonal    Unspecified transient cerebral ischemia     Unspecified vitamin D deficiency        Past Surgical History:   Procedure Laterality Date    AORTIC VALVE REPLACEMENT  7/12/2013     Dr. Erica Peguero  6/6/2013     Dr. Oni Reed - coronaries WNL , LVEF WNL    Gentry Moan  5/2012    Dr. Yuri Lucia - bilateral    [address] [facility]    COLONOSCOPY  03/30/2011    DR. Mello  - Internal hemorrhoids, sigmoid diverticulosis, hyperplastic polyps. Repeat in five  years.  COLONOSCOPY  02/23/2009    nternal hemorrhoids, Diverticulosis, Colon polyps. Repeat two years    COLONOSCOPY  5/8/2013     Dr. Opal Dunn  - internal hemorrhoids , diverticulosis and polyps     COLONOSCOPY  5/4/2015, 3/7/2016    - hemorrhoids, diverticulosis, hyperplastic colon polyps, repeat in 1 year    COLONOSCOPY  1/28/2019    COLONOSCOPY WITH BIOPSY performed by Trung Watson MD at [facility]  1/28/2019    COLONOSCOPY POLYPECTOMY ABLATION performed by Trung Watson MD at [facility]  1/28/2019    COLONOSCOPY CONTROL HEMORRHAGE performed by Trung Watson MD at [address] [facility]  [id number]     DR. Dacosta - CYSTOSCOPY RETROGRADE, RIGHT STENT PLACEMENT    OTHER SURGICAL HISTORY  6/27/2013    Dr. Lukas Andrade - CYSTOSCOPY RIGHT URETEROSCOPY, RENAL STONE EXTRACTION    UPPER GASTROINTESTINAL ENDOSCOPY  3/7/2016    gastritis and esophagitis     UPPER GASTROINTESTINAL ENDOSCOPY N/A 1/28/2019    EGD BIOPSY performed by Trung Watson MD at [facility] ENDOSCOPY       Family History   Problem Relation Age of Onset    Heart Disease Father     Cancer Sister         pancreatic cancer    Heart Disease Brother     Heart Disease Brother     Heart Disease Brother Allergies   Allergen Reactions    Actos [Pioglitazone Hydrochloride]      angioedema     Lisinopril Other (See Comments)     Swelling of tongue    Penicillins Hives and Itching    Lipitor      myalgias    Zocor [Simvastatin] Other (See Comments)     Leg tingling , myalgias    Glucophage [Metformin Hydrochloride]      diarrhea       Current Outpatient Medications   Medication Sig Dispense Refill    doxycycline hyclate (VIBRA-TABS) 100 MG tablet Take 1 tablet by mouth 2 times daily for 7 days 14 tablet 0    pantoprazole (PROTONIX) 40 MG tablet TAKE ONE TABLET BY MOUTH DAILY 90 tablet 4    pravastatin (PRAVACHOL) 40 MG tablet TAKE ONE TABLET BY MOUTH DAILY 33 tablet 0    Nutritional Supplements (GLUCERNA ADVANCE SHAKE PO) Take 1 each by mouth daily      amLODIPine (NORVASC) 2.5 MG tablet TAKE ONE TABLET BY MOUTH DAILY 30 tablet 4    vitamin D (ERGOCALCIFEROL) 10467 units CAPS capsule Take 1 capsule by mouth once a week 12 capsule 3    blood glucose test strips (ONE TOUCH ULTRA TEST) strip 1 each by In Vitro route 2 times daily As needed. 100 each 3    guaiFENesin 400 MG tablet Take 400 mg by mouth Daily      fluticasone (FLONASE) 50 MCG/ACT nasal spray 1 spray by Each Nostril route daily 1 Bottle 3    pravastatin (PRAVACHOL) 40 MG tablet Take 1 tablet by mouth daily 90 tablet 1    Multiple Vitamins-Minerals (ONE-A-DAY WOMENS 50 PLUS PO) Take 1 capsule by mouth daily      cetirizine (ZYRTEC) 10 MG tablet Take 10 mg by mouth daily as needed       ACCU-CHEK BERNABE PLUS strip TEST BLOOD SUGAR ONE TIME A DAY OR AS DIRECTED BY PHYSICIAN. 100 each 3    aspirin 81 MG tablet Take 81 mg by mouth daily. No current facility-administered medications for this visit. Objective:     Vitals:    01/03/20 1121   BP: (!) 130/58   Pulse: 83   Temp: 97.8 °F (36.6 °C)   SpO2: 96%       Physical Exam  Constitutional:       Appearance: She is well-developed. HENT:      Head: Normocephalic.       Right Ear: Tympanic membrane normal.      Left Ear: Tympanic membrane normal.      Nose: Congestion and rhinorrhea present. Mouth/Throat:      Mouth: Mucous membranes are moist.      Pharynx: Oropharynx is clear. No oropharyngeal exudate or posterior oropharyngeal erythema. Eyes:      Pupils: Pupils are equal, round, and reactive to light. Neck:      Musculoskeletal: Normal range of motion. Cardiovascular:      Rate and Rhythm: Normal rate and regular rhythm. Heart sounds: Murmur present. Pulmonary:      Effort: Pulmonary effort is normal.      Breath sounds: Normal breath sounds. Musculoskeletal: Normal range of motion. Skin:     General: Skin is warm and dry. Neurological:      Mental Status: She is alert and oriented to person, place, and time. Assessment & Plan:     Health Maintenance   Topic Date Due    Annual Wellness Visit (AWV)  05/29/2019    Shingles Vaccine (1 of 2) 11/10/2023 (Originally 2/12/1985)    Lipid screen  12/06/2020    Potassium monitoring  12/06/2020    Creatinine monitoring  12/06/2020    DTaP/Tdap/Td vaccine (2 - Td) 08/10/2021    DEXA (modify frequency per FRAX score)  Completed    Flu vaccine  Completed    Pneumococcal 65+ years Vaccine  Completed        Diagnosis Orders   1. Acute bacterial sinusitis  doxycycline hyclate (VIBRA-TABS) 100 MG tablet   2. Mild intermittent asthma without complication     3. Essential hypertension     4. Type 2 diabetes mellitus without complication, without long-term current use of insulin ([facility])         Asthma  Well-controlled at this time. Hypertension  Well-controlled. I have encouraged patient to avoid pseudoephedrine-containing over-the-counter products. Type 2 diabetes mellitus without complication, without long-term current use of insulin ([address]  Patient has stopped her diabetes medications. She states her fasting sugars run between 111 and 126.     Acute bacterial sinusitis  Patient has a documented penicillin allergy, will cover with doxycycline. Encouraged her to continue with sinus flushing. She may have some luck with over-the-counter guaifenesin and pushing fluids.   Avoid Sudafed      Call office for for follow up for any worsening of condition or failure to improve    FELICIA Mcgee - CNP

## 2020-01-03 NOTE — ASSESSMENT & PLAN NOTE
Patient has a documented penicillin allergy, will cover with doxycycline. Encouraged her to continue with sinus flushing. She may have some luck with over-the-counter guaifenesin and pushing fluids.   Avoid Sudafed

## 2020-01-15 ENCOUNTER — CARE COORDINATION (OUTPATIENT)
Dept: CARE COORDINATION | Age: 85
End: 2020-01-15

## 2020-01-30 ENCOUNTER — CARE COORDINATION (OUTPATIENT)
Dept: CARE COORDINATION | Age: 85
End: 2020-01-30

## 2020-01-30 NOTE — PATIENT INSTRUCTIONS
you can lose your balance and fall. · Talk to your doctor if you have numbness in your feet. Preventing falls at home  · Remove raised doorway thresholds, throw rugs, and clutter. Repair loose carpet or raised areas in the floor. · Move furniture and electrical cords to keep them out of walking paths. · Use nonskid floor wax, and wipe up spills right away, especially on ceramic tile floors. · If you use a walker or cane, put rubber tips on it. If you use crutches, clean the bottoms of them regularly with an abrasive pad, such as steel wool. · Keep your house well lit, especially [place], and outside walkways. Use night-lights in areas such as hallways and bathrooms. Add extra light switches or use remote switches (such as switches that go on or off when you clap your hands) to make it easier to turn lights on if you have to get up during the night. · Install sturdy handrails on stairways. · Move items in your cabinets so that the things you use a lot are on the lower shelves (about waist level). · Keep a cordless phone and a flashlight with new batteries by your bed. If possible, put a phone in each of the main rooms of your house, or carry a cell phone in case you fall and cannot reach a phone. Or, you can wear a device around your neck or wrist. You push a button that sends a signal for help. · Wear low-heeled shoes that fit well and give your feet good support. Use footwear with nonskid soles. Check the heels and soles of your shoes for wear. Repair or replace worn heels or soles. · Do not wear socks without shoes on wood floors. · Walk on the grass when the sidewalks are slippery. If you live in an area that gets snow and ice in the winter, sprinkle salt on slippery steps and sidewalks. Preventing falls in the bath  · Install grab bars and nonskid mats inside and outside your shower or tub and near the toilet and sinks. · Use shower chairs and bath benches.   · Use a hand-held shower head that will allow you to sit while showering. · Get into a tub or shower by putting the weaker leg in first. Get out of a tub or shower with your strong side first.  · Repair loose toilet seats and consider installing a raised toilet seat to make getting on and off the toilet easier. · Keep your bathroom door unlocked while you are in the shower. Where can you learn more? Go to https://chpepiceweb.[email or web address]. org and sign in to your [employer] account. Enter [id number] [id number] [phone] in the Talking Data box to learn more about \"Preventing Falls: Care Instructions. \"     If you do not have an account, please click on the \"Sign Up Now\" link. Current as of: August 6, 2019  Content Version: 12.3  © 8320-5917 Healthwise, [employer]. Care instructions adapted under license by [facility] ([facility]). If you have questions about a medical condition or this instruction, always ask your healthcare professional. [place] [address] any warranty or liability for your use of this information. Patient Education        Preventing Outdoor Falls: Care Instructions  Your Care Instructions    Worries about falls don't need to keep you indoors. Outdoor activities like walking have big benefits for your health. You will need to watch your step and learn a few safety measures. If you are worried about having a fall outdoors, ask your doctor about exercises, classes, or physical therapy that may help. You can learn ways to gain strength, flexibility, and balance. Ask if it might help to use a cane or walker. You can make your time outdoors safer with a few simple measures. Follow-up care is a key part of your treatment and safety. Be sure to make and go to all appointments, and call your doctor if you are having problems. It's also a good idea to know your test results and keep a list of the medicines you take. How can you prevent falls outdoors? · Wear shoes with firm soles and low heels.  If you have to walk on an icy surface, use grippers that can be worn over your shoes in bad weather. · Be extra careful if weather is bad. Walk on the grass when the sidewalks are slick. If you live in a place that gets snow and ice in the winter, sprinkle salt on slippery stairs and sidewalks. · Be careful getting on or off buses and trains or getting in and out of cars. If handrails are available, use them. · Be careful when you cross the street. Look for crosswalks or places where curb cuts or ramps are present. · Try not to hurry, especially if you are carrying something. · Be cautious in parking lots or garages. There may be curbs or changes in pavement, or the height of the pavement may vary. · Make sure to wear the correct eyeglasses, if you need them. Reading glasses or bifocals can make it harder to see hazards that might be in your way. · If you are walking outdoors for exercise, try to:  ? Walk in well-lighted, well-maintained areas. These include high school or college tracks, shopping malls, and public spaces. ? Walk with a partner. ? Watch out for cracked sidewalks, curbs, changes in the height of the pavement, exposed tree roots, and debris such as fallen leaves or branches. Where can you learn more? Go to https://AchaLaberniceeweb.healthPeepsqueeze Inc. org and sign in to your TUTORize account. Enter Z287 in the HeatGenie box to learn more about \"Preventing Outdoor Falls: Care Instructions. \"     If you do not have an account, please click on the \"Sign Up Now\" link. Current as of: August 6, 2019  Content Version: 12.3  © 1261-1763 Healthwise, [employer]. Care instructions adapted under license by [facility] ([facility]). If you have questions about a medical condition or this instruction, always ask your healthcare professional. [address] any warranty or liability for your use of this information.

## 2020-02-18 ENCOUNTER — CARE COORDINATION (OUTPATIENT)
Dept: CARE COORDINATION | Age: 85
End: 2020-02-18

## 2020-02-18 RX ORDER — AMLODIPINE BESYLATE 2.5 MG/1
TABLET ORAL
Qty: 30 TABLET | Refills: 5 | Status: SHIPPED | OUTPATIENT
Start: 2020-02-18 | End: 2020-06-08 | Stop reason: SDUPTHER

## 2020-02-20 RX ORDER — PRAVASTATIN SODIUM 40 MG
TABLET ORAL
Qty: 90 TABLET | Refills: 1 | Status: SHIPPED | OUTPATIENT
Start: 2020-02-20 | End: 2020-09-22

## 2020-03-02 ENCOUNTER — CARE COORDINATION (OUTPATIENT)
Dept: CARE COORDINATION | Age: 85
End: 2020-03-02

## 2020-03-02 NOTE — CARE COORDINATION
The Ambulatory Care Manager called and left a voice message asking the pt to return the nurse's call at the pt's convenience.

## 2020-03-31 ENCOUNTER — APPOINTMENT (OUTPATIENT)
Dept: GENERAL RADIOLOGY | Age: 85
DRG: 872 | End: 2020-03-31
Payer: MEDICARE

## 2020-03-31 ENCOUNTER — HOSPITAL ENCOUNTER (INPATIENT)
Age: 85
LOS: 4 days | Discharge: HOME | DRG: 872 | End: 2020-04-04
Attending: EMERGENCY MEDICINE | Admitting: INTERNAL MEDICINE
Payer: MEDICARE

## 2020-03-31 PROBLEM — R50.9 FEVER AND CHILLS: Status: ACTIVE | Noted: 2020-03-31

## 2020-03-31 LAB
ALBUMIN SERPL-MCNC: 4.2 G/DL (ref 3.4–5)
ALP BLD-CCNC: 97 U/L (ref 40–129)
ALT SERPL-CCNC: 22 U/L (ref 10–40)
ANION GAP SERPL CALCULATED.3IONS-SCNC: 14 MMOL/L (ref 3–16)
AST SERPL-CCNC: 46 U/L (ref 15–37)
BACTERIA: ABNORMAL /HPF
BASE EXCESS VENOUS: 1.8 MMOL/L (ref -2–3)
BASOPHILS ABSOLUTE: 0 K/UL (ref 0–0.2)
BASOPHILS RELATIVE PERCENT: 0.3 %
BILIRUB SERPL-MCNC: 1 MG/DL (ref 0–1)
BILIRUBIN DIRECT: 0.3 MG/DL (ref 0–0.3)
BILIRUBIN URINE: NEGATIVE
BILIRUBIN, INDIRECT: 0.7 MG/DL (ref 0–1)
BLOOD, URINE: ABNORMAL
BUN BLDV-MCNC: 17 MG/DL (ref 7–20)
CALCIUM SERPL-MCNC: 9.7 MG/DL (ref 8.3–10.6)
CARBOXYHEMOGLOBIN: 1.4 % (ref 0–1.5)
CHLORIDE BLD-SCNC: 100 MMOL/L (ref 99–110)
CLARITY: CLEAR
CO2: 25 MMOL/L (ref 21–32)
COLOR: YELLOW
CREAT SERPL-MCNC: 0.6 MG/DL (ref 0.6–1.2)
EKG ATRIAL RATE: 97 BPM
EKG DIAGNOSIS: NORMAL
EKG P AXIS: 51 DEGREES
EKG P-R INTERVAL: 162 MS
EKG Q-T INTERVAL: 386 MS
EKG QRS DURATION: 130 MS
EKG QTC CALCULATION (BAZETT): 490 MS
EKG R AXIS: -51 DEGREES
EKG T AXIS: 81 DEGREES
EKG VENTRICULAR RATE: 97 BPM
EOSINOPHILS ABSOLUTE: 0.1 K/UL (ref 0–0.6)
EOSINOPHILS RELATIVE PERCENT: 1.1 %
EPITHELIAL CELLS, UA: ABNORMAL /HPF (ref 0–5)
GFR AFRICAN AMERICAN: >60
GFR NON-AFRICAN AMERICAN: >60
GLUCOSE BLD-MCNC: 188 MG/DL (ref 70–99)
GLUCOSE URINE: NEGATIVE MG/DL
HCO3 VENOUS: 26.5 MMOL/L (ref 24–28)
HCT VFR BLD CALC: 46.7 % (ref 36–48)
HEMOGLOBIN, VEN, REDUCED: 55.6 %
HEMOGLOBIN: 15.5 G/DL (ref 12–16)
KETONES, URINE: NEGATIVE MG/DL
LACTIC ACID, SEPSIS: 3.2 MMOL/L (ref 0.4–1.9)
LACTIC ACID, SEPSIS: 3.2 MMOL/L (ref 0.4–1.9)
LEUKOCYTE ESTERASE, URINE: ABNORMAL
LIPASE: 29 U/L (ref 13–60)
LYMPHOCYTES ABSOLUTE: 0.3 K/UL (ref 1–5.1)
LYMPHOCYTES RELATIVE PERCENT: 5.6 %
MCH RBC QN AUTO: 31 PG (ref 26–34)
MCHC RBC AUTO-ENTMCNC: 33.3 G/DL (ref 31–36)
MCV RBC AUTO: 93.2 FL (ref 80–100)
METHEMOGLOBIN VENOUS: 0.7 % (ref 0–1.5)
MICROSCOPIC EXAMINATION: YES
MONOCYTES ABSOLUTE: 0 K/UL (ref 0–1.3)
MONOCYTES RELATIVE PERCENT: 0.6 %
MUCUS: ABNORMAL /LPF
NEUTROPHILS ABSOLUTE: 4.3 K/UL (ref 1.7–7.7)
NEUTROPHILS RELATIVE PERCENT: 92.4 %
NITRITE, URINE: NEGATIVE
O2 SAT, VEN: 43 %
PCO2, VEN: 43.8 MMHG (ref 41–51)
PDW BLD-RTO: 13.7 % (ref 12.4–15.4)
PH UA: 6 (ref 5–8)
PH VENOUS: 7.4 (ref 7.35–7.45)
PLATELET # BLD: 84 K/UL (ref 135–450)
PMV BLD AUTO: 9.5 FL (ref 5–10.5)
PO2, VEN: 26.7 MMHG (ref 25–40)
POTASSIUM SERPL-SCNC: 3.9 MMOL/L (ref 3.5–5.1)
PROTEIN UA: ABNORMAL MG/DL
RAPID INFLUENZA  B AGN: NEGATIVE
RAPID INFLUENZA A AGN: NEGATIVE
RBC # BLD: 5.01 M/UL (ref 4–5.2)
RBC UA: ABNORMAL /HPF (ref 0–4)
SODIUM BLD-SCNC: 139 MMOL/L (ref 136–145)
SPECIFIC GRAVITY UA: 1.02 (ref 1–1.03)
TCO2 CALC VENOUS: 28 MMOL/L
TOTAL PROTEIN: 7.6 G/DL (ref 6.4–8.2)
URINE TYPE: ABNORMAL
UROBILINOGEN, URINE: 0.2 E.U./DL
WBC # BLD: 4.6 K/UL (ref 4–11)
WBC UA: ABNORMAL /HPF (ref 0–5)

## 2020-03-31 PROCEDURE — 82728 ASSAY OF FERRITIN: CPT

## 2020-03-31 PROCEDURE — 83605 ASSAY OF LACTIC ACID: CPT

## 2020-03-31 PROCEDURE — 87086 URINE CULTURE/COLONY COUNT: CPT

## 2020-03-31 PROCEDURE — 80076 HEPATIC FUNCTION PANEL: CPT

## 2020-03-31 PROCEDURE — 82803 BLOOD GASES ANY COMBINATION: CPT

## 2020-03-31 PROCEDURE — 36415 COLL VENOUS BLD VENIPUNCTURE: CPT

## 2020-03-31 PROCEDURE — 71045 X-RAY EXAM CHEST 1 VIEW: CPT

## 2020-03-31 PROCEDURE — 85025 COMPLETE CBC W/AUTO DIFF WBC: CPT

## 2020-03-31 PROCEDURE — 2060000000 HC ICU INTERMEDIATE R&B

## 2020-03-31 PROCEDURE — 93005 ELECTROCARDIOGRAM TRACING: CPT | Performed by: PHYSICIAN ASSISTANT

## 2020-03-31 PROCEDURE — 87186 SC STD MICRODIL/AGAR DIL: CPT

## 2020-03-31 PROCEDURE — 6360000002 HC RX W HCPCS

## 2020-03-31 PROCEDURE — 80048 BASIC METABOLIC PNL TOTAL CA: CPT

## 2020-03-31 PROCEDURE — 86140 C-REACTIVE PROTEIN: CPT

## 2020-03-31 PROCEDURE — 6370000000 HC RX 637 (ALT 250 FOR IP): Performed by: PHYSICIAN ASSISTANT

## 2020-03-31 PROCEDURE — 2580000003 HC RX 258: Performed by: STUDENT IN AN ORGANIZED HEALTH CARE EDUCATION/TRAINING PROGRAM

## 2020-03-31 PROCEDURE — 83690 ASSAY OF LIPASE: CPT

## 2020-03-31 PROCEDURE — 81001 URINALYSIS AUTO W/SCOPE: CPT

## 2020-03-31 PROCEDURE — 2580000003 HC RX 258: Performed by: PHYSICIAN ASSISTANT

## 2020-03-31 PROCEDURE — 87804 INFLUENZA ASSAY W/OPTIC: CPT

## 2020-03-31 PROCEDURE — 87150 DNA/RNA AMPLIFIED PROBE: CPT

## 2020-03-31 PROCEDURE — 96374 THER/PROPH/DIAG INJ IV PUSH: CPT

## 2020-03-31 PROCEDURE — 87040 BLOOD CULTURE FOR BACTERIA: CPT

## 2020-03-31 PROCEDURE — 99285 EMERGENCY DEPT VISIT HI MDM: CPT

## 2020-03-31 RX ORDER — ACETAMINOPHEN 500 MG
1000 TABLET ORAL ONCE
Status: COMPLETED | OUTPATIENT
Start: 2020-03-31 | End: 2020-03-31

## 2020-03-31 RX ORDER — ONDANSETRON 2 MG/ML
4 INJECTION INTRAMUSCULAR; INTRAVENOUS ONCE
Status: COMPLETED | OUTPATIENT
Start: 2020-03-31 | End: 2020-03-31

## 2020-03-31 RX ORDER — 0.9 % SODIUM CHLORIDE 0.9 %
1000 INTRAVENOUS SOLUTION INTRAVENOUS ONCE
Status: COMPLETED | OUTPATIENT
Start: 2020-03-31 | End: 2020-04-01

## 2020-03-31 RX ORDER — CHOLECALCIFEROL (VITAMIN D3) 1250 MCG
CAPSULE ORAL
COMMUNITY
End: 2020-12-17 | Stop reason: SDUPTHER

## 2020-03-31 RX ORDER — SODIUM CHLORIDE 9 MG/ML
1000 INJECTION, SOLUTION INTRAVENOUS CONTINUOUS
Status: DISCONTINUED | OUTPATIENT
Start: 2020-03-31 | End: 2020-04-01

## 2020-03-31 RX ORDER — ONDANSETRON 2 MG/ML
INJECTION INTRAMUSCULAR; INTRAVENOUS
Status: COMPLETED
Start: 2020-03-31 | End: 2020-03-31

## 2020-03-31 RX ORDER — SODIUM CHLORIDE, SODIUM LACTATE, POTASSIUM CHLORIDE, CALCIUM CHLORIDE 600; 310; 30; 20 MG/100ML; MG/100ML; MG/100ML; MG/100ML
1000 INJECTION, SOLUTION INTRAVENOUS ONCE
Status: COMPLETED | OUTPATIENT
Start: 2020-03-31 | End: 2020-03-31

## 2020-03-31 RX ADMIN — ONDANSETRON 4 MG: 2 INJECTION INTRAMUSCULAR; INTRAVENOUS at 18:32

## 2020-03-31 RX ADMIN — ACETAMINOPHEN 1000 MG: 500 TABLET ORAL at 20:49

## 2020-03-31 RX ADMIN — SODIUM CHLORIDE 1000 ML: 0.9 INJECTION, SOLUTION INTRAVENOUS at 23:19

## 2020-03-31 RX ADMIN — SODIUM CHLORIDE, SODIUM LACTATE, POTASSIUM CHLORIDE, AND CALCIUM CHLORIDE 1000 ML: .6; .31; .03; .02 INJECTION, SOLUTION INTRAVENOUS at 18:44

## 2020-03-31 NOTE — ED PROVIDER NOTES
36.0 - 48.0 %    MCV 93.2 80.0 - 100.0 fL    MCH 31.0 26.0 - 34.0 pg    MCHC 33.3 31.0 - 36.0 g/dL    RDW 13.7 12.4 - 15.4 %    Platelets 84 (L) 761 - 450 K/uL    MPV 9.5 5.0 - 10.5 fL    Neutrophils % 92.4 %    Lymphocytes % 5.6 %    Monocytes % 0.6 %    Eosinophils % 1.1 %    Basophils % 0.3 %    Neutrophils Absolute 4.3 1.7 - 7.7 K/uL    Lymphocytes Absolute 0.3 (L) 1.0 - 5.1 K/uL    Monocytes Absolute 0.0 0.0 - 1.3 K/uL    Eosinophils Absolute 0.1 0.0 - 0.6 K/uL    Basophils Absolute 0.0 0.0 - 0.2 K/uL   Basic Metabolic Panel   Result Value Ref Range    Sodium 139 136 - 145 mmol/L    Potassium 3.9 3.5 - 5.1 mmol/L    Chloride 100 99 - 110 mmol/L    CO2 25 21 - 32 mmol/L    Anion Gap 14 3 - 16    Glucose 188 (H) 70 - 99 mg/dL    BUN 17 7 - 20 mg/dL    CREATININE 0.6 0.6 - 1.2 mg/dL    GFR Non-African American >60 >60    GFR African American >60 >60    Calcium 9.7 8.3 - 10.6 mg/dL   Hepatic function panel (LFTs)   Result Value Ref Range    Total Protein 7.6 6.4 - 8.2 g/dL    Alb 4.2 3.4 - 5.0 g/dL    Alkaline Phosphatase 97 40 - 129 U/L    ALT 22 10 - 40 U/L    AST 46 (H) 15 - 37 U/L    Total Bilirubin 1.0 0.0 - 1.0 mg/dL    Bilirubin, Direct 0.3 0.0 - 0.3 mg/dL    Bilirubin, Indirect 0.7 0.0 - 1.0 mg/dL   Lipase   Result Value Ref Range    Lipase 29.0 13.0 - 60.0 U/L   Blood gas, venous (Lab)   Result Value Ref Range    pH, Artis 7.399 7.350 - 7.450    pCO2, Artis 43.8 41.0 - 51.0 mmHg    pO2, Artis 26.7 25.0 - 40.0 mmHg    HCO3, Venous 26.5 24.0 - 28.0 mmol/L    Base Excess, Arits 1.8 -2.0 - 3.0 mmol/L    O2 Sat, Artis 43 Not established %    Carboxyhemoglobin 1.4 0.0 - 1.5 %    MetHgb, Artis 0.7 0.0 - 1.5 %    TC02 (Calc), Artis 28 mmol/L    Hemoglobin, Artis, Reduced 55.60 %   Lactate, Sepsis   Result Value Ref Range    Lactic Acid, Sepsis 3.2 (H) 0.4 - 1.9 mmol/L   Lactate, Sepsis   Result Value Ref Range    Lactic Acid, Sepsis 3.2 (H) 0.4 - 1.9 mmol/L   Urinalysis   Result Value Ref Range    Color, UA Yellow Straw/Yellow Clarity, UA Clear Clear    Glucose, Ur Negative Negative mg/dL    Bilirubin Urine Negative Negative    Ketones, Urine Negative Negative mg/dL    Specific Gravity, UA 1.020 1.005 - 1.030    Blood, Urine SMALL (A) Negative    pH, UA 6.0 5.0 - 8.0    Protein, UA TRACE (A) Negative mg/dL    Urobilinogen, Urine 0.2 <2.0 E.U./dL    Nitrite, Urine Negative Negative    Leukocyte Esterase, Urine TRACE (A) Negative    Microscopic Examination YES     Urine Type NotGiven    Microscopic Urinalysis   Result Value Ref Range    Mucus, UA 2+ (A) None Seen /LPF    WBC, UA 6-10 (A) 0 - 5 /HPF    RBC, UA 0-2 0 - 4 /HPF    Epithelial Cells, UA 6-10 (A) 0 - 5 /HPF    Bacteria, UA 1+ (A) None Seen /HPF   EKG 12 Lead   Result Value Ref Range    Ventricular Rate 97 BPM    Atrial Rate 97 BPM    P-R Interval 162 ms    QRS Duration 130 ms    Q-T Interval 386 ms    QTc Calculation (Bazett) 490 ms    P Axis 51 degrees    R Axis -51 degrees    T Axis 81 degrees    Diagnosis       EKG performed in ER and to be interpreted by ER physician. Confirmed by MD, ER ([id number]),  Carmine Ogden ([id number]) on 3/31/2020 6:17:00 PM       ED BEDSIDE ULTRASOUND:      RECENT VITALS:  BP: (!) 126/50, Temp: 100.6 °F (38.1 °C), Pulse: 98, Resp: 24, SpO2: 95 %     Procedures         ED Course     Nursing Notes, Past Medical Hx, Past Surgical Hx, Social Hx, Allergies, and Family Hx were reviewed. The patient was given the following medications:  Orders Placed This Encounter   Medications    ondansetron (ZOFRAN) injection 4 mg    ondansetron (ZOFRAN) 4 MG/2ML injection     CHERYL HILARIO: cabinet override    lactated ringers infusion 1,000 mL    acetaminophen (TYLENOL) tablet 1,000 mg       CONSULTS:  IP CONSULT TO [address] / ASSESSMENT / Zee Stefan is a 80 y.o. female with a history of aortic stenosis, hypertension presents here with acute onset nausea, vomiting, and fever.   On examination she has vomiting at

## 2020-03-31 NOTE — ED NOTES
Bed: A11-11  Expected date:   Expected time:   Means of arrival:   Comments:  [address], RN  03/31/20 [id number]

## 2020-04-01 ENCOUNTER — APPOINTMENT (OUTPATIENT)
Dept: GENERAL RADIOLOGY | Age: 85
DRG: 872 | End: 2020-04-01
Payer: MEDICARE

## 2020-04-01 LAB
A/G RATIO: 1.2 (ref 1.1–2.2)
ALBUMIN SERPL-MCNC: 3 G/DL (ref 3.4–5)
ALBUMIN SERPL-MCNC: 3.1 G/DL (ref 3.4–5)
ALBUMIN SERPL-MCNC: 3.4 G/DL (ref 3.4–5)
ALP BLD-CCNC: 50 U/L (ref 40–129)
ALP BLD-CCNC: 58 U/L (ref 40–129)
ALT SERPL-CCNC: 18 U/L (ref 10–40)
ALT SERPL-CCNC: 27 U/L (ref 10–40)
ANION GAP SERPL CALCULATED.3IONS-SCNC: 9 MMOL/L (ref 3–16)
ANION GAP SERPL CALCULATED.3IONS-SCNC: 9 MMOL/L (ref 3–16)
AST SERPL-CCNC: 38 U/L (ref 15–37)
AST SERPL-CCNC: 53 U/L (ref 15–37)
BASOPHILS ABSOLUTE: 0 K/UL (ref 0–0.2)
BASOPHILS ABSOLUTE: 0 K/UL (ref 0–0.2)
BASOPHILS RELATIVE PERCENT: 0.2 %
BASOPHILS RELATIVE PERCENT: 0.3 %
BILIRUB SERPL-MCNC: 1.3 MG/DL (ref 0–1)
BILIRUB SERPL-MCNC: 1.4 MG/DL (ref 0–1)
BILIRUBIN DIRECT: 0.3 MG/DL (ref 0–0.3)
BILIRUBIN, INDIRECT: 1.1 MG/DL (ref 0–1)
BUN BLDV-MCNC: 14 MG/DL (ref 7–20)
BUN BLDV-MCNC: 20 MG/DL (ref 7–20)
C-REACTIVE PROTEIN: 2.2 MG/L (ref 0–5.1)
CALCIUM SERPL-MCNC: 8 MG/DL (ref 8.3–10.6)
CALCIUM SERPL-MCNC: 8.1 MG/DL (ref 8.3–10.6)
CHLORIDE BLD-SCNC: 107 MMOL/L (ref 99–110)
CHLORIDE BLD-SCNC: 108 MMOL/L (ref 99–110)
CO2: 21 MMOL/L (ref 21–32)
CO2: 21 MMOL/L (ref 21–32)
CORTISOL TOTAL: 13.5 UG/DL
CREAT SERPL-MCNC: 0.6 MG/DL (ref 0.6–1.2)
CREAT SERPL-MCNC: 0.7 MG/DL (ref 0.6–1.2)
EOSINOPHILS ABSOLUTE: 0 K/UL (ref 0–0.6)
EOSINOPHILS ABSOLUTE: 0.2 K/UL (ref 0–0.6)
EOSINOPHILS RELATIVE PERCENT: 0.1 %
EOSINOPHILS RELATIVE PERCENT: 2.3 %
FERRITIN: 157.3 NG/ML (ref 15–150)
GFR AFRICAN AMERICAN: >60
GFR AFRICAN AMERICAN: >60
GFR NON-AFRICAN AMERICAN: >60
GFR NON-AFRICAN AMERICAN: >60
GLOBULIN: 2.6 G/DL
GLUCOSE BLD-MCNC: 162 MG/DL (ref 70–99)
GLUCOSE BLD-MCNC: 164 MG/DL (ref 70–99)
GLUCOSE BLD-MCNC: 169 MG/DL (ref 70–99)
GLUCOSE BLD-MCNC: 181 MG/DL (ref 70–99)
GLUCOSE BLD-MCNC: 198 MG/DL (ref 70–99)
GLUCOSE BLD-MCNC: 207 MG/DL (ref 70–99)
HCT VFR BLD CALC: 35.8 % (ref 36–48)
HCT VFR BLD CALC: 37.1 % (ref 36–48)
HEMOGLOBIN: 12.4 G/DL (ref 12–16)
HEMOGLOBIN: 12.7 G/DL (ref 12–16)
LACTIC ACID: 1.9 MMOL/L (ref 0.4–2)
LYMPHOCYTES ABSOLUTE: 0.6 K/UL (ref 1–5.1)
LYMPHOCYTES ABSOLUTE: 0.6 K/UL (ref 1–5.1)
LYMPHOCYTES RELATIVE PERCENT: 4.1 %
LYMPHOCYTES RELATIVE PERCENT: 7.6 %
MCH RBC QN AUTO: 31.9 PG (ref 26–34)
MCH RBC QN AUTO: 32.4 PG (ref 26–34)
MCHC RBC AUTO-ENTMCNC: 34.3 G/DL (ref 31–36)
MCHC RBC AUTO-ENTMCNC: 34.6 G/DL (ref 31–36)
MCV RBC AUTO: 93.1 FL (ref 80–100)
MCV RBC AUTO: 93.4 FL (ref 80–100)
MONOCYTES ABSOLUTE: 0.5 K/UL (ref 0–1.3)
MONOCYTES ABSOLUTE: 0.9 K/UL (ref 0–1.3)
MONOCYTES RELATIVE PERCENT: 5.8 %
MONOCYTES RELATIVE PERCENT: 7 %
MRSA SCREEN RT-PCR: NORMAL
NEUTROPHILS ABSOLUTE: 13.4 K/UL (ref 1.7–7.7)
NEUTROPHILS ABSOLUTE: 6.3 K/UL (ref 1.7–7.7)
NEUTROPHILS RELATIVE PERCENT: 82.8 %
NEUTROPHILS RELATIVE PERCENT: 89.8 %
PDW BLD-RTO: 13.6 % (ref 12.4–15.4)
PDW BLD-RTO: 13.7 % (ref 12.4–15.4)
PERFORMED ON: ABNORMAL
PHOSPHORUS: 2.2 MG/DL (ref 2.5–4.9)
PLATELET # BLD: 43 K/UL (ref 135–450)
PLATELET # BLD: 56 K/UL (ref 135–450)
PMV BLD AUTO: 9.7 FL (ref 5–10.5)
PMV BLD AUTO: 9.8 FL (ref 5–10.5)
POTASSIUM SERPL-SCNC: 4.2 MMOL/L (ref 3.5–5.1)
POTASSIUM SERPL-SCNC: 4.3 MMOL/L (ref 3.5–5.1)
RBC # BLD: 3.84 M/UL (ref 4–5.2)
RBC # BLD: 3.99 M/UL (ref 4–5.2)
REPORT: NORMAL
REPORT: NORMAL
RESPIRATORY PANEL PCR: NORMAL
SODIUM BLD-SCNC: 137 MMOL/L (ref 136–145)
SODIUM BLD-SCNC: 138 MMOL/L (ref 136–145)
TOTAL PROTEIN: 5.6 G/DL (ref 6.4–8.2)
TOTAL PROTEIN: 6 G/DL (ref 6.4–8.2)
TSH REFLEX: 1.06 UIU/ML (ref 0.27–4.2)
WBC # BLD: 14.9 K/UL (ref 4–11)
WBC # BLD: 7.6 K/UL (ref 4–11)

## 2020-04-01 PROCEDURE — 84443 ASSAY THYROID STIM HORMONE: CPT

## 2020-04-01 PROCEDURE — 2580000003 HC RX 258: Performed by: STUDENT IN AN ORGANIZED HEALTH CARE EDUCATION/TRAINING PROGRAM

## 2020-04-01 PROCEDURE — 99223 1ST HOSP IP/OBS HIGH 75: CPT | Performed by: INTERNAL MEDICINE

## 2020-04-01 PROCEDURE — 87641 MR-STAPH DNA AMP PROBE: CPT

## 2020-04-01 PROCEDURE — 80053 COMPREHEN METABOLIC PANEL: CPT

## 2020-04-01 PROCEDURE — 36415 COLL VENOUS BLD VENIPUNCTURE: CPT

## 2020-04-01 PROCEDURE — 99222 1ST HOSP IP/OBS MODERATE 55: CPT | Performed by: INTERNAL MEDICINE

## 2020-04-01 PROCEDURE — 2580000003 HC RX 258: Performed by: INTERNAL MEDICINE

## 2020-04-01 PROCEDURE — 6370000000 HC RX 637 (ALT 250 FOR IP): Performed by: INTERNAL MEDICINE

## 2020-04-01 PROCEDURE — 0100U HC RESPIRPTHGN MULT REV TRANS & AMP PRB TECH 21 TRGT: CPT

## 2020-04-01 PROCEDURE — 85025 COMPLETE CBC W/AUTO DIFF WBC: CPT

## 2020-04-01 PROCEDURE — 82533 TOTAL CORTISOL: CPT

## 2020-04-01 PROCEDURE — 6360000002 HC RX W HCPCS: Performed by: STUDENT IN AN ORGANIZED HEALTH CARE EDUCATION/TRAINING PROGRAM

## 2020-04-01 PROCEDURE — 71045 X-RAY EXAM CHEST 1 VIEW: CPT

## 2020-04-01 PROCEDURE — 83605 ASSAY OF LACTIC ACID: CPT

## 2020-04-01 PROCEDURE — 2060000000 HC ICU INTERMEDIATE R&B

## 2020-04-01 PROCEDURE — 6370000000 HC RX 637 (ALT 250 FOR IP): Performed by: STUDENT IN AN ORGANIZED HEALTH CARE EDUCATION/TRAINING PROGRAM

## 2020-04-01 RX ORDER — SODIUM CHLORIDE 0.9 % (FLUSH) 0.9 %
10 SYRINGE (ML) INJECTION EVERY 12 HOURS SCHEDULED
Status: DISCONTINUED | OUTPATIENT
Start: 2020-04-01 | End: 2020-04-04 | Stop reason: HOSPADM

## 2020-04-01 RX ORDER — ACETAMINOPHEN 650 MG/1
650 SUPPOSITORY RECTAL EVERY 4 HOURS PRN
Status: DISCONTINUED | OUTPATIENT
Start: 2020-04-01 | End: 2020-04-04 | Stop reason: HOSPADM

## 2020-04-01 RX ORDER — NICOTINE POLACRILEX 4 MG
15 LOZENGE BUCCAL PRN
Status: DISCONTINUED | OUTPATIENT
Start: 2020-04-01 | End: 2020-04-04 | Stop reason: HOSPADM

## 2020-04-01 RX ORDER — ACETAMINOPHEN 325 MG/1
650 TABLET ORAL EVERY 6 HOURS PRN
Status: DISCONTINUED | OUTPATIENT
Start: 2020-04-01 | End: 2020-04-01

## 2020-04-01 RX ORDER — 0.9 % SODIUM CHLORIDE 0.9 %
500 INTRAVENOUS SOLUTION INTRAVENOUS ONCE
Status: COMPLETED | OUTPATIENT
Start: 2020-04-01 | End: 2020-04-01

## 2020-04-01 RX ORDER — INSULIN LISPRO 100 [IU]/ML
0-3 INJECTION, SOLUTION INTRAVENOUS; SUBCUTANEOUS NIGHTLY
Status: DISCONTINUED | OUTPATIENT
Start: 2020-04-01 | End: 2020-04-02

## 2020-04-01 RX ORDER — ACETAMINOPHEN 325 MG/1
650 TABLET ORAL EVERY 4 HOURS PRN
Status: DISCONTINUED | OUTPATIENT
Start: 2020-04-01 | End: 2020-04-04 | Stop reason: HOSPADM

## 2020-04-01 RX ORDER — SODIUM CHLORIDE 0.9 % (FLUSH) 0.9 %
10 SYRINGE (ML) INJECTION PRN
Status: DISCONTINUED | OUTPATIENT
Start: 2020-04-01 | End: 2020-04-04 | Stop reason: HOSPADM

## 2020-04-01 RX ORDER — SODIUM CHLORIDE 9 MG/ML
INJECTION, SOLUTION INTRAVENOUS CONTINUOUS
Status: DISCONTINUED | OUTPATIENT
Start: 2020-04-01 | End: 2020-04-02

## 2020-04-01 RX ORDER — ACETAMINOPHEN 650 MG/1
650 SUPPOSITORY RECTAL EVERY 6 HOURS PRN
Status: DISCONTINUED | OUTPATIENT
Start: 2020-04-01 | End: 2020-04-01

## 2020-04-01 RX ORDER — DEXTROSE MONOHYDRATE 25 G/50ML
12.5 INJECTION, SOLUTION INTRAVENOUS PRN
Status: DISCONTINUED | OUTPATIENT
Start: 2020-04-01 | End: 2020-04-04 | Stop reason: HOSPADM

## 2020-04-01 RX ORDER — INSULIN LISPRO 100 [IU]/ML
0-6 INJECTION, SOLUTION INTRAVENOUS; SUBCUTANEOUS
Status: DISCONTINUED | OUTPATIENT
Start: 2020-04-01 | End: 2020-04-02

## 2020-04-01 RX ORDER — ASPIRIN 81 MG/1
81 TABLET ORAL DAILY
Status: DISCONTINUED | OUTPATIENT
Start: 2020-04-01 | End: 2020-04-04 | Stop reason: HOSPADM

## 2020-04-01 RX ORDER — DEXTROSE MONOHYDRATE 50 MG/ML
100 INJECTION, SOLUTION INTRAVENOUS PRN
Status: DISCONTINUED | OUTPATIENT
Start: 2020-04-01 | End: 2020-04-04 | Stop reason: HOSPADM

## 2020-04-01 RX ORDER — PRAVASTATIN SODIUM 40 MG
40 TABLET ORAL DAILY
Status: DISCONTINUED | OUTPATIENT
Start: 2020-04-01 | End: 2020-04-04 | Stop reason: HOSPADM

## 2020-04-01 RX ORDER — PANTOPRAZOLE SODIUM 40 MG/1
40 TABLET, DELAYED RELEASE ORAL DAILY
Status: DISCONTINUED | OUTPATIENT
Start: 2020-04-01 | End: 2020-04-04 | Stop reason: HOSPADM

## 2020-04-01 RX ADMIN — CEFEPIME 2 G: 2 INJECTION, POWDER, FOR SOLUTION INTRAVENOUS at 00:09

## 2020-04-01 RX ADMIN — SODIUM CHLORIDE 500 ML: 9 INJECTION, SOLUTION INTRAVENOUS at 00:48

## 2020-04-01 RX ADMIN — INSULIN LISPRO 1 UNITS: 100 INJECTION, SOLUTION INTRAVENOUS; SUBCUTANEOUS at 20:46

## 2020-04-01 RX ADMIN — Medication 10 ML: at 21:56

## 2020-04-01 RX ADMIN — INSULIN LISPRO 1 UNITS: 100 INJECTION, SOLUTION INTRAVENOUS; SUBCUTANEOUS at 11:51

## 2020-04-01 RX ADMIN — CEFEPIME 2 G: 2 INJECTION, POWDER, FOR SOLUTION INTRAVENOUS at 08:56

## 2020-04-01 RX ADMIN — SODIUM CHLORIDE: 9 INJECTION, SOLUTION INTRAVENOUS at 02:31

## 2020-04-01 RX ADMIN — VANCOMYCIN HYDROCHLORIDE 1250 MG: 10 INJECTION, POWDER, LYOPHILIZED, FOR SOLUTION INTRAVENOUS at 20:13

## 2020-04-01 RX ADMIN — ASPIRIN 81 MG: 81 TABLET, COATED ORAL at 08:55

## 2020-04-01 RX ADMIN — ACETAMINOPHEN 650 MG: 325 TABLET ORAL at 20:34

## 2020-04-01 RX ADMIN — CEFEPIME 2 G: 2 INJECTION, POWDER, FOR SOLUTION INTRAVENOUS at 18:49

## 2020-04-01 RX ADMIN — INSULIN LISPRO 1 UNITS: 100 INJECTION, SOLUTION INTRAVENOUS; SUBCUTANEOUS at 18:30

## 2020-04-01 RX ADMIN — PRAVASTATIN SODIUM 40 MG: 40 TABLET ORAL at 08:55

## 2020-04-01 RX ADMIN — VANCOMYCIN HYDROCHLORIDE 1500 MG: 10 INJECTION, POWDER, LYOPHILIZED, FOR SOLUTION INTRAVENOUS at 01:06

## 2020-04-01 RX ADMIN — PANTOPRAZOLE SODIUM 40 MG: 40 TABLET, DELAYED RELEASE ORAL at 08:55

## 2020-04-01 RX ADMIN — SODIUM CHLORIDE 500 ML: 9 INJECTION, SOLUTION INTRAVENOUS at 09:15

## 2020-04-01 RX ADMIN — AZITHROMYCIN MONOHYDRATE 500 MG: 500 INJECTION, POWDER, LYOPHILIZED, FOR SOLUTION INTRAVENOUS at 06:24

## 2020-04-01 ASSESSMENT — ENCOUNTER SYMPTOMS
VOMITING: 1
VOMITING: 0
ABDOMINAL PAIN: 0
COUGH: 1
CHEST TIGHTNESS: 0
DIARRHEA: 0
CONSTIPATION: 0
WHEEZING: 0
NAUSEA: 1
NAUSEA: 0
SHORTNESS OF BREATH: 0
BLOOD IN STOOL: 0

## 2020-04-01 ASSESSMENT — PAIN SCALES - GENERAL
PAINLEVEL_OUTOF10: 0

## 2020-04-01 NOTE — H&P
 Allergic rhinitis, cause unspecified     Allergic urticaria     Angioneurotic edema not elsewhere classified     Aortic stenosis 12/9/2011    dx 11/2007 - echo - LVEF 55% , AV area 1.5 cm , peak velocity - 27 ECHO 6/2009 - LVEF 60%, AV area 1.3 cm , peak velocity 35  ECHO 12/1/2011 - LVEF -60-65%, AV area 0.99 cm , peak velocity 34  asymptomatic      Benign neoplasm of colon     Cancer ([address]     cervical 1964    Carcinoma in situ of cervix uteri     Coronary atherosclerosis of unspecified type of vessel, native or graft     Essential hypertension, benign     History of kidney stones     Internal hemorrhoids without mention of complication     MI, old 2007    Other and unspecified hyperlipidemia     Palpitations     Peptic ulcer, unspecified site, unspecified as acute or chronic, without mention of hemorrhage, perforation, or obstruction     Sebaceous cyst of breast 5/31/2012    Trigger finger 1/8/2013    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 Dano Elias  6/6/2013     Dr. Hector Hughes - coronaries WNL , LVEF WNL    Stephania Russell  5/2012    Dr. Srikanth Mtz - bilateral    Crystal Winn    COLONOSCOPY  03/30/2011    DR. Mello  - Internal hemorrhoids, sigmoid diverticulosis, hyperplastic polyps. Repeat in five  years.  COLONOSCOPY  02/23/2009    nternal hemorrhoids, Diverticulosis, Colon polyps.   Repeat two years    COLONOSCOPY  5/8/2013     Dr. Wil Rivera  - internal hemorrhoids , lymphopenic. CXR with no consolidation. UA showed 6-10 WBC, 1+ bacteria and trace leukocyte esterase. Previous history of bacteremias, most recent in 05/2019.   - sepsis fluids and antibiotics not given in the ED, with current clinical picture, will complete 30 cc/kg of fluids and give broad spectrum antibiotics since symptoms do not point towards an obvious source. - cannot rule out COVID given non-specific symptoms, fevers and chills, discussed with [facility], will send to commercial lab    - monitor lactate  - continue fluid resuscitation   - blood cultures collected   - urine culture collected, pending   - check CRP and ferritin     Aortic insufficiency  - no issues correctly     Hypertension  - hold home meds     Hyperlipidemia  - continue home meds      GERD  - continue home Protonix      CODE STATUS: Limited   FEN: DIET GENERAL;   PPX: lovenox   DISPO: Wards    This patient has been staffed and discussed with Dr. Trinity Nam.      Bernardo Nicholas , MDPGY-3

## 2020-04-01 NOTE — PROGRESS NOTES
Vitals:   T-max:  Patient Vitals for the past 8 hrs:   BP Temp Temp src Pulse Resp SpO2 Height Weight   04/01/20 0745 (!) 97/49 98.1 °F (36.7 °C) Oral 65 18 97 % -- --   04/01/20 0625 (!) 108/43 -- -- -- -- -- -- --   04/01/20 0513 (!) 100/53 97.1 °F (36.2 °C) Axillary 63 18 95 % -- --   04/01/20 0503 (!) 113/51 -- -- -- -- -- -- --   04/01/20 0502 (!) 109/50 -- -- -- -- -- -- --   04/01/20 0437 (!) 98/34 -- -- -- -- -- -- --   04/01/20 0429 (!) 90/28 -- -- -- -- -- -- --   04/01/20 0423 (!) 76/38 -- -- -- -- -- -- --   04/01/20 0422 (!) 80/38 -- -- -- -- -- -- --   04/01/20 0303 (!) 94/36 -- -- -- -- -- -- --   04/01/20 0301 (!) 94/28 -- -- -- -- -- -- --   04/01/20 0254 (!) 96/36 98.2 °F (36.8 °C) Axillary 73 18 -- -- --   04/01/20 0253 (!) 86/40 -- -- -- -- -- -- --   04/01/20 0235 (!) 84/44 -- -- -- -- -- -- --   04/01/20 0234 (!) 87/43 -- -- -- -- -- -- --   04/01/20 0122 (!) 94/47 -- -- -- -- -- -- --   04/01/20 0108 (!) 82/43 -- -- -- -- -- -- --   04/01/20 0040 (!) 87/47 99.5 °F (37.5 °C) Axillary 83 18 95 % 5' 4\" (1.626 m) 168 lb 3.4 oz (76.3 kg)       Intake/Output Summary (Last 24 hours) at 4/1/2020 0751  Last data filed at 4/1/2020 0503  Gross per 24 hour   Intake 1000 ml   Output 200 ml   Net 800 ml       Review of Systems   Constitutional: Positive for chills and fever. Negative for activity change, appetite change and unexpected weight change. Eyes: Negative for visual disturbance. Respiratory: Negative for chest tightness and shortness of breath. Cardiovascular: Negative for chest pain. Gastrointestinal: Negative for abdominal pain, blood in stool, diarrhea, nausea and vomiting. Skin: Negative for rash. Allergic/Immunologic: Negative for immunocompromised state. Neurological: Negative for dizziness and headaches. Psychiatric/Behavioral: Negative for dysphoric mood and suicidal ideas. Physical Exam  Constitutional:       General: She is not in acute distress.      Appearance: She is well-developed. She is not diaphoretic. HENT:      Head: Normocephalic and atraumatic. Mouth/Throat:      Pharynx: No oropharyngeal exudate. Eyes:      General: No scleral icterus. Right eye: No discharge. Left eye: No discharge. Conjunctiva/sclera: Conjunctivae normal.   Neck:      Musculoskeletal: Normal range of motion and neck supple. Cardiovascular:      Rate and Rhythm: Normal rate. Pulses: Normal pulses. Heart sounds: Normal heart sounds. No murmur. Pulmonary:      Effort: Pulmonary effort is normal. No respiratory distress. Breath sounds: Normal breath sounds. No wheezing or rales. Abdominal:      General: There is no distension. Palpations: Abdomen is soft. Tenderness: There is no abdominal tenderness. There is no guarding. Musculoskeletal:         General: No deformity. Right lower leg: No edema. Left lower leg: No edema. Lymphadenopathy:      Head:      Right side of head: No submental or submandibular adenopathy. Left side of head: No submental or submandibular adenopathy. Cervical: No cervical adenopathy. Right cervical: No superficial or deep cervical adenopathy. Left cervical: No superficial or deep cervical adenopathy. Skin:     Findings: No rash. Neurological:      Mental Status: She is alert and oriented to person, place, and time. GCS: GCS eye subscore is 4. GCS verbal subscore is 5. GCS motor subscore is 6. Cranial Nerves: Cranial nerves are intact. Sensory: Sensation is intact. Motor: Motor function is intact. No abnormal muscle tone. Deep Tendon Reflexes: Reflexes are normal and symmetric. Psychiatric:         Behavior: Behavior normal.         Thought Content:  Thought content normal.         LABS:  CBC:   Recent Labs     03/31/20  1817 04/01/20  0550   WBC 4.6 14.9*   HGB 15.5 12.7   HCT 46.7 37.1   PLT 84* 56*   MCV 93.2 93.1     Renal:    Recent Labs

## 2020-04-01 NOTE — CONSULTS
Infectious Diseases Inpatient Consult Note    Reason for Consult:   Fever   Requesting Physician:   Dr Galina Bertrand  Primary Care Physician:  Lisette Coffman MD  History Obtained From:   Pt, EPIC    Admit Date: 3/31/2020  Hospital Day: 2    CHIEF COMPLAINT:       Chief Complaint   Patient presents with    Fever    Emesis       HISTORY OF PRESENT ILLNESS:      81 yo with mult medical problems including HTN, AS  AVR, CAD, nephrolithiasis  Hx E coli bacteremia, 3/2013  Hx Streptococcal salivarius bacteremia 5/31/2019    Presents with abrupt onset fever with chills on Tue afternoon, 3/31. She had felt well prior to this. On way to hosp and in ED, she had episode of nausea / vomiting   + cough that is no different than usual.  No SOB / DUFFY. No sick contacts - known contact with person with COVID, no travel    In ED 3/31 - T 100.6, 102. WBC 4.6  CXR 'peribronchial thickening', no consolidation  Started on vancomycin + cefepime    Rapid influenza A / B neg  Resp PCR panel neg  COVID-19 test sent to [facility], pending    Today 4/1,  Tm 99.3. WBC 14.9  Feel good.     Past Medical History:    Past Medical History:   Diagnosis Date    Allergic rhinitis, cause unspecified     Allergic urticaria     Angioneurotic edema not elsewhere classified     Aortic stenosis 12/9/2011    dx 11/2007 - echo - LVEF 55% , AV area 1.5 cm , peak velocity - 27 ECHO 6/2009 - LVEF 60%, AV area 1.3 cm , peak velocity 35  ECHO 12/1/2011 - LVEF -60-65%, AV area 0.99 cm , peak velocity 34  asymptomatic      Benign neoplasm of colon     Cancer (H[facility])     cervical 1964    Carcinoma in situ of cervix uteri     Coronary atherosclerosis of unspecified type of vessel, native or graft     Essential hypertension, benign     History of kidney stones     Internal hemorrhoids without mention of complication     MI, old 2007    Other and unspecified hyperlipidemia     Palpitations     Peptic ulcer, unspecified site, unspecified as acute or chronic, without mention of hemorrhage, perforation, or obstruction     Sebaceous cyst of breast 5/31/2012    Trigger finger 1/8/2013    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 Connors [place]  6/6/2013     Dr. Julia Farley - coronaries WNL , LVEF WNL    Michela Brill  5/2012    Dr. Bustos Double - bilateral    Yolande [place] Dr. Alejandro Ruiz    COLONOSCOPY  03/30/2011    DR. Mello  - Internal hemorrhoids, sigmoid diverticulosis, hyperplastic polyps. Repeat in five  years.  COLONOSCOPY  02/23/2009    nternal hemorrhoids, Diverticulosis, Colon polyps. Repeat two years    COLONOSCOPY  5/8/2013     Dr. Elver Magallanes  - internal hemorrhoids , diverticulosis and polyps     COLONOSCOPY  5/4/2015, 3/7/2016    - hemorrhoids, diverticulosis, hyperplastic colon polyps, repeat in 1 year    COLONOSCOPY  1/28/2019    COLONOSCOPY WITH BIOPSY performed by Rashawn De La Rosa MD at [facility]  1/28/2019    COLONOSCOPY POLYPECTOMY ABLATION [facility] [address] [facility]  1/28/2019    COLONOSCOPY CONTROL HEMORRHAGE performed by Rashawn De La Rosa MD at [address] [facility]  [id number]     DR. Dacosta - CYSTOSCOPY RETROGRADE, RIGHT STENT PLACEMENT    OTHER SURGICAL HISTORY  6/27/2013    Dr. Elie Weaver - CYSTOSCOPY RIGHT URETEROSCOPY, RENAL STONE EXTRACTION    UPPER GASTROINTESTINAL ENDOSCOPY  3/7/2016    gastritis and esophagitis     UPPER GASTROINTESTINAL ENDOSCOPY N/A 1/28/2019    EGD BIOPSY performed by Arlet Oliveira MD at [facility] ENDOSCOPY       Current Medications:     aspirin  81 mg Oral Daily    pantoprazole  40 mg Oral Daily    pravastatin  40 mg Oral Daily    sodium chloride flush  10 mL Intravenous 2 times per day    vancomycin  1,250 mg Intravenous Q18H    insulin lispro  0-6 Units Subcutaneous TID WC    insulin lispro  0-3 Units Subcutaneous Nightly    cefepime  2 g Intravenous Q8H       Allergies:  Actos [pioglitazone hydrochloride]; Lisinopril; Penicillins; Lipitor; Zocor [simvastatin]; and Glucophage [metformin hydrochloride]    Social History:    TOBACCO:    None   ETOH:    Occasional wine  DRUGS:   None   MARITAL STATUS:      OCCUPATION:   Retired - teacher    Family History:   No immunodeficiency    REVIEW OF SYSTEMS:    + fever / chills . No weight loss. No visual change, eye pain, eye discharge. No oral lesion, sore throat, dysphagia. Denies cough / sputum. Denies chest pain, palpitations. + n / v. No abd pain. No diarrhea. Denies dysuria or change in urinary function. Denies joint swelling or pain. No myalgia, arthralgia. Denies skin changes, itching  Denies focal weakness, sensory change or other neurologic symptom    Denies new / worse depression, psychiatric symptoms  Denies any Endocrine or Hematologic symptoms    PHYSICAL EXAM:      Vitals:    BP (!) 143/55   Pulse 80   Temp 99.3 °F (37.4 °C) (Oral)   Resp 20   Ht 5' 4\" (1.626 m)   Wt 168 lb 3.4 oz (76.3 kg)   SpO2 97%   BMI 28.87 kg/m²     GENERAL: No apparent distress.   RA  HEENT: Membranes moist, no oral lesion, PERRL  NECK:  Supple, no lymphadenopaty  LUNGS: Clear b/l, no rales, no dullness  CARDIAC: RRR, no murmur appreciated  ABD:  + BS, soft / NT  EXT:  No rash, no edema, no lesions  NEURO: No focal neurologic findings  PSYCH: Orientation, sensorium, mood normal  LINES:  Peripheral iv    DATA:    Lab Results   Component Value Date    WBC 14.9

## 2020-04-01 NOTE — PROGRESS NOTES
Updated daughter Nichol George on last night's transfer to PCU and low BPs, as well as the fluid boluses and monitoring with IV ATB started.

## 2020-04-01 NOTE — ED NOTES
Attempted report to [facility] on [address] [place] [address], not answering phone, will re-attempt again in few minutes      Brandan Batista, REGINO  03/31/20 5862

## 2020-04-01 NOTE — PROGRESS NOTES
Code status re-discussed with patient. Patient does not want intubation, compressions, shock. Patient is agreeable to central line for pressors if required.

## 2020-04-01 NOTE — ED NOTES
Home Med List is complete. Source of medications in list is pt interview. Patient states she had most meds today.       Please note:  Removed from Home Med List:    1. fluconazole    2. guaifenesin     Added to the Home Med List:    1. gingko      3/31/2020 10:28 PM  Tammie S Andrae Barry Intern

## 2020-04-01 NOTE — PROGRESS NOTES
Clinical Pharmacy Progress Note    Admit date: 3/31/2020    Subjective/Objective:  81 yo female with PMH significant for bioprosthetic aortic valve replacement (2013), GERD, MI (2007), HTN, E. coli bacteremia (2013) and Strep bacteremia (5/2019) presented to the hospital with fevers, chills, and emesis. She was admitted and started and on empiric broad spectrum IV antibiotics for empiric treatment of sepsis and COVID-19 rule-out. Pharmacy is consulted to dose Vancomycin per Dr. Mary Parisi. Pertinent Medications:   (4/1)  Vancomycin -- day #1   1.5 g IV x 1 loading dose (4/1)   1.25 g IV q18h (scheduled to start 4/1)  Cefepime 2 g IV Q 8 hr -- day #1    PERTINENT LABS:  Recent Labs     03/31/20 1817 04/01/20  0550    137   K 3.9 4.3    107   CO2 25 21   PHOS  --  2.2*   BUN 17 20   CREATININE 0.6 0.7       Estimated Creatinine Clearance: 59 mL/min (based on SCr of 0.7 mg/dL). Recent Labs     03/31/20 1817 04/01/20  0550   WBC 4.6 14.9*   HGB 15.5 12.7   HCT 46.7 37.1   MCV 93.2 93.1   PLT 84* 56*       Height:  5' 4\" (162.6 cm)  Weight: 168 lb 3.4 oz (76.3 kg)    Microbiology:  3/31 Blood culture x 2: In process  3/31 Rapid influenza: Negative  3/31 Urine culture: In process  3/31 COVID-19: In process  4/1 MRSA nasal PCR: Ordered    Assessment/Plan:  1. Empiric sepsis / COVID-19 rule-out:  Vancomycin + cefepime day #1  Vancomycin  · Will start 1500 mg IV LD  · Will adjust maintenance regimen to 1.25 g IV q18h based on patient's age and estimated vancomycin half-life of 13 hours. · Will plan to obtain a trough once vancomycin has reached steady state, likely prior to the 4th or 5th dose of this regimen. · Renal function will be monitored closely and dosing will be adjusted as appropriate. Cefepime  · Patient is borderline for renal dose adjustment to 2 g IV q12h based on SCr 0.7 mg/dL and CrCL 59 mL/min. · Patient's baseline SCr appears to be 0.5-0.7 mg/dL.   · Will leave cefepime as 2 g IV q8h for now. · Will continue to monitor and adjust dose as appropriate per [facility] Renal Dose Adjustment Protocol. Please call with any questions. Thank you for consulting pharmacy!   Ramiro Sneed, PharmD, [facility]  Wireless: [phone]  4/1/2020 8:16 AM

## 2020-04-01 NOTE — PROGRESS NOTES
4 Eyes Admission Assessment     I agree as the admission nurse that 2 RN's have performed a thorough Head to Toe Skin Assessment on the patient. ALL assessment sites listed below have been assessed on admission. Areas assessed by both nurses:   [x]   Head, Face, and Ears   [x]   Shoulders, Back, and Chest  [x]   Arms, Elbows, and Hands - r wrist, old scratch  [x]   Coccyx, Sacrum, and Ischum  [x]   Legs, Feet, and Heels        Does the Patient have Skin Breakdown?   No         Tigre Prevention initiated:  No   Wound Care Orders initiated:  No      [facility] nurse consulted for Pressure Injury (Stage 3,4, Unstageable, DTI, NWPT, and Complex wounds):  No      Nurse 1 eSignature: Electronically signed by Ed Jones RN on 4/1/20 at 1:11 AM EDT    **SHARE this note so that the co-signing nurse is able to place an eSignature**    Nurse 2 eSignature: Electronically signed by Elkin Gu RN on 4/1/2020 at 1:46 AM

## 2020-04-01 NOTE — ED NOTES
Report to [facility] on [address] [place] [address], transport to floor with REGINO Watts RN  03/31/20 8200

## 2020-04-02 LAB
ALBUMIN SERPL-MCNC: 3.1 G/DL (ref 3.4–5)
ANION GAP SERPL CALCULATED.3IONS-SCNC: 10 MMOL/L (ref 3–16)
BASOPHILS ABSOLUTE: 0 K/UL (ref 0–0.2)
BASOPHILS RELATIVE PERCENT: 0.3 %
BUN BLDV-MCNC: 11 MG/DL (ref 7–20)
CALCIUM SERPL-MCNC: 8 MG/DL (ref 8.3–10.6)
CHLORIDE BLD-SCNC: 109 MMOL/L (ref 99–110)
CO2: 22 MMOL/L (ref 21–32)
CREAT SERPL-MCNC: 0.5 MG/DL (ref 0.6–1.2)
EOSINOPHILS ABSOLUTE: 0.3 K/UL (ref 0–0.6)
EOSINOPHILS RELATIVE PERCENT: 3.9 %
GFR AFRICAN AMERICAN: >60
GFR NON-AFRICAN AMERICAN: >60
GLUCOSE BLD-MCNC: 121 MG/DL (ref 70–99)
GLUCOSE BLD-MCNC: 147 MG/DL (ref 70–99)
GLUCOSE BLD-MCNC: 171 MG/DL (ref 70–99)
GLUCOSE BLD-MCNC: 199 MG/DL (ref 70–99)
HCT VFR BLD CALC: 36.4 % (ref 36–48)
HEMOGLOBIN: 12.3 G/DL (ref 12–16)
LYMPHOCYTES ABSOLUTE: 0.8 K/UL (ref 1–5.1)
LYMPHOCYTES RELATIVE PERCENT: 10.9 %
Lab: NORMAL
MCH RBC QN AUTO: 31.9 PG (ref 26–34)
MCHC RBC AUTO-ENTMCNC: 33.8 G/DL (ref 31–36)
MCV RBC AUTO: 94.4 FL (ref 80–100)
MONOCYTES ABSOLUTE: 0.7 K/UL (ref 0–1.3)
MONOCYTES RELATIVE PERCENT: 9.2 %
NEUTROPHILS ABSOLUTE: 5.4 K/UL (ref 1.7–7.7)
NEUTROPHILS RELATIVE PERCENT: 75.7 %
PDW BLD-RTO: 14.1 % (ref 12.4–15.4)
PERFORMED ON: ABNORMAL
PHOSPHORUS: 1.9 MG/DL (ref 2.5–4.9)
PLATELET # BLD: 37 K/UL (ref 135–450)
PMV BLD AUTO: 9.2 FL (ref 5–10.5)
POTASSIUM SERPL-SCNC: 4.1 MMOL/L (ref 3.5–5.1)
PROCALCITONIN: 9.46 NG/ML (ref 0–0.15)
RBC # BLD: 3.85 M/UL (ref 4–5.2)
REPORT: NORMAL
REPORT: NORMAL
SARS-COV-2: NOT DETECTED
SODIUM BLD-SCNC: 141 MMOL/L (ref 136–145)
THIS TEST SENT TO: NORMAL
URINE CULTURE, ROUTINE: NORMAL
WBC # BLD: 7.1 K/UL (ref 4–11)

## 2020-04-02 PROCEDURE — 6370000000 HC RX 637 (ALT 250 FOR IP): Performed by: STUDENT IN AN ORGANIZED HEALTH CARE EDUCATION/TRAINING PROGRAM

## 2020-04-02 PROCEDURE — 80069 RENAL FUNCTION PANEL: CPT

## 2020-04-02 PROCEDURE — 84145 PROCALCITONIN (PCT): CPT

## 2020-04-02 PROCEDURE — 2580000003 HC RX 258: Performed by: STUDENT IN AN ORGANIZED HEALTH CARE EDUCATION/TRAINING PROGRAM

## 2020-04-02 PROCEDURE — 99232 SBSQ HOSP IP/OBS MODERATE 35: CPT | Performed by: INTERNAL MEDICINE

## 2020-04-02 PROCEDURE — 85025 COMPLETE CBC W/AUTO DIFF WBC: CPT

## 2020-04-02 PROCEDURE — 1200000000 HC SEMI PRIVATE

## 2020-04-02 PROCEDURE — 99233 SBSQ HOSP IP/OBS HIGH 50: CPT | Performed by: INTERNAL MEDICINE

## 2020-04-02 PROCEDURE — 6360000002 HC RX W HCPCS: Performed by: STUDENT IN AN ORGANIZED HEALTH CARE EDUCATION/TRAINING PROGRAM

## 2020-04-02 RX ORDER — INSULIN LISPRO 100 [IU]/ML
0-6 INJECTION, SOLUTION INTRAVENOUS; SUBCUTANEOUS NIGHTLY
Status: DISCONTINUED | OUTPATIENT
Start: 2020-04-02 | End: 2020-04-04 | Stop reason: HOSPADM

## 2020-04-02 RX ORDER — INSULIN LISPRO 100 [IU]/ML
0-12 INJECTION, SOLUTION INTRAVENOUS; SUBCUTANEOUS
Status: DISCONTINUED | OUTPATIENT
Start: 2020-04-02 | End: 2020-04-04 | Stop reason: HOSPADM

## 2020-04-02 RX ADMIN — CEFEPIME 2 G: 2 INJECTION, POWDER, FOR SOLUTION INTRAVENOUS at 11:01

## 2020-04-02 RX ADMIN — CEFEPIME 2 G: 2 INJECTION, POWDER, FOR SOLUTION INTRAVENOUS at 17:16

## 2020-04-02 RX ADMIN — PANTOPRAZOLE SODIUM 40 MG: 40 TABLET, DELAYED RELEASE ORAL at 08:54

## 2020-04-02 RX ADMIN — INSULIN LISPRO 2 UNITS: 100 INJECTION, SOLUTION INTRAVENOUS; SUBCUTANEOUS at 12:05

## 2020-04-02 RX ADMIN — PRAVASTATIN SODIUM 40 MG: 40 TABLET ORAL at 08:54

## 2020-04-02 RX ADMIN — INSULIN LISPRO 1 UNITS: 100 INJECTION, SOLUTION INTRAVENOUS; SUBCUTANEOUS at 21:15

## 2020-04-02 RX ADMIN — Medication 10 ML: at 21:22

## 2020-04-02 RX ADMIN — ASPIRIN 81 MG: 81 TABLET, COATED ORAL at 08:54

## 2020-04-02 RX ADMIN — CEFEPIME 2 G: 2 INJECTION, POWDER, FOR SOLUTION INTRAVENOUS at 23:59

## 2020-04-02 RX ADMIN — CEFEPIME 2 G: 2 INJECTION, POWDER, FOR SOLUTION INTRAVENOUS at 03:00

## 2020-04-02 RX ADMIN — INSULIN LISPRO 2 UNITS: 100 INJECTION, SOLUTION INTRAVENOUS; SUBCUTANEOUS at 17:24

## 2020-04-02 ASSESSMENT — PAIN SCALES - GENERAL
PAINLEVEL_OUTOF10: 0

## 2020-04-02 ASSESSMENT — ENCOUNTER SYMPTOMS
BLOOD IN STOOL: 0
NAUSEA: 0
SHORTNESS OF BREATH: 0
ABDOMINAL PAIN: 0
DIARRHEA: 0
VOMITING: 0
CHEST TIGHTNESS: 0

## 2020-04-02 NOTE — PROGRESS NOTES
Department of Pharmacy    Notification received from laboratory of positive blood culture results. Organism(s) detected: pseudomonas  Applicable Antimicrobial Resistance Genes: none    Patient on appropriate empiric therapy with cefepime.  Agree with dosing     Nadeem Galvan PharmD, [address] [place] [address]: [phone]  [address] [place] [address] Wireless: [phone]

## 2020-04-02 NOTE — PLAN OF CARE
Problem: Falls - Risk of:  Goal: Will remain free from falls  Description: Will remain free from falls  4/2/2020 0130 by Shweta Rucker RN  Outcome: Ongoing     Problem: Gas Exchange - Impaired:  Goal: Levels of oxygenation will improve  Description: Levels of oxygenation will improve  4/2/2020 0130 by Shweta uRcker RN  Outcome: Ongoing

## 2020-04-02 NOTE — PLAN OF CARE
Problem: Falls - Risk of:  Goal: Will remain free from falls  Description: Will remain free from falls  4/2/2020 1300 by Byron Medrano RN  Note: Call light in reach at all times  OOB with contact guard assist of staff  nonskid socks when OOB  bed low with wheels locked  continue fall prevention measures     Problem: Gas Exchange - Impaired:  Goal: Levels of oxygenation will improve  Description: Levels of oxygenation will improve  4/2/2020 1300 by Byron Medrano RN  Note: Patient alert and orient x4  lungs CTA diminished bibasilar  no shortness breath observed rest or exertion  room air pulse ox mid to upper 90's  patient afebrile  stron nonproductive cough  continue intermittent IV antibiotics per order  continue assess

## 2020-04-02 NOTE — PROGRESS NOTES
Updated daughter Xiomara Friend this morning with results of blood culture and issues with fever last night, as well as Dr. Lorri Bhat visit.

## 2020-04-03 PROBLEM — B96.5 PSEUDOMONAL BACTEREMIA: Status: ACTIVE | Noted: 2019-05-31

## 2020-04-03 LAB
ALBUMIN SERPL-MCNC: 3.4 G/DL (ref 3.4–5)
ANION GAP SERPL CALCULATED.3IONS-SCNC: 14 MMOL/L (ref 3–16)
BASOPHILS ABSOLUTE: 0 K/UL (ref 0–0.2)
BASOPHILS RELATIVE PERCENT: 0.5 %
BLOOD SMEAR REVIEW: NORMAL
BUN BLDV-MCNC: 8 MG/DL (ref 7–20)
CALCIUM SERPL-MCNC: 8.7 MG/DL (ref 8.3–10.6)
CHLORIDE BLD-SCNC: 104 MMOL/L (ref 99–110)
CO2: 22 MMOL/L (ref 21–32)
CREAT SERPL-MCNC: 0.5 MG/DL (ref 0.6–1.2)
D DIMER: 548 NG/ML DDU (ref 0–229)
EOSINOPHILS ABSOLUTE: 0.2 K/UL (ref 0–0.6)
EOSINOPHILS RELATIVE PERCENT: 3.6 %
GFR AFRICAN AMERICAN: >60
GFR NON-AFRICAN AMERICAN: >60
GLUCOSE BLD-MCNC: 116 MG/DL (ref 70–99)
GLUCOSE BLD-MCNC: 125 MG/DL (ref 70–99)
GLUCOSE BLD-MCNC: 137 MG/DL (ref 70–99)
GLUCOSE BLD-MCNC: 149 MG/DL (ref 70–99)
GLUCOSE BLD-MCNC: 158 MG/DL (ref 70–99)
HAPTOGLOBIN: <10 MG/DL (ref 30–200)
HCT VFR BLD CALC: 35.5 % (ref 36–48)
HEMOGLOBIN: 12.2 G/DL (ref 12–16)
INR BLD: 1.26 (ref 0.86–1.14)
LACTATE DEHYDROGENASE: 382 U/L (ref 100–190)
LYMPHOCYTES ABSOLUTE: 0.8 K/UL (ref 1–5.1)
LYMPHOCYTES RELATIVE PERCENT: 12.6 %
MCH RBC QN AUTO: 31.9 PG (ref 26–34)
MCHC RBC AUTO-ENTMCNC: 34.4 G/DL (ref 31–36)
MCV RBC AUTO: 92.8 FL (ref 80–100)
MONOCYTES ABSOLUTE: 0.6 K/UL (ref 0–1.3)
MONOCYTES RELATIVE PERCENT: 9 %
NEUTROPHILS ABSOLUTE: 4.8 K/UL (ref 1.7–7.7)
NEUTROPHILS RELATIVE PERCENT: 74.3 %
PDW BLD-RTO: 14.1 % (ref 12.4–15.4)
PERFORMED ON: ABNORMAL
PHOSPHORUS: 1.8 MG/DL (ref 2.5–4.9)
PLATELET # BLD: 51 K/UL (ref 135–450)
PLATELET SLIDE REVIEW: ABNORMAL
PMV BLD AUTO: 9.9 FL (ref 5–10.5)
POTASSIUM SERPL-SCNC: 4.1 MMOL/L (ref 3.5–5.1)
PROTHROMBIN TIME: 14.7 SEC (ref 10–13.2)
RBC # BLD: 3.82 M/UL (ref 4–5.2)
SODIUM BLD-SCNC: 140 MMOL/L (ref 136–145)
WBC # BLD: 6.5 K/UL (ref 4–11)

## 2020-04-03 PROCEDURE — 85379 FIBRIN DEGRADATION QUANT: CPT

## 2020-04-03 PROCEDURE — 6370000000 HC RX 637 (ALT 250 FOR IP): Performed by: STUDENT IN AN ORGANIZED HEALTH CARE EDUCATION/TRAINING PROGRAM

## 2020-04-03 PROCEDURE — 85025 COMPLETE CBC W/AUTO DIFF WBC: CPT

## 2020-04-03 PROCEDURE — 93325 DOPPLER ECHO COLOR FLOW MAPG: CPT

## 2020-04-03 PROCEDURE — 85610 PROTHROMBIN TIME: CPT

## 2020-04-03 PROCEDURE — 1200000000 HC SEMI PRIVATE

## 2020-04-03 PROCEDURE — 83010 ASSAY OF HAPTOGLOBIN QUANT: CPT

## 2020-04-03 PROCEDURE — 36415 COLL VENOUS BLD VENIPUNCTURE: CPT

## 2020-04-03 PROCEDURE — 97116 GAIT TRAINING THERAPY: CPT

## 2020-04-03 PROCEDURE — 2580000003 HC RX 258: Performed by: STUDENT IN AN ORGANIZED HEALTH CARE EDUCATION/TRAINING PROGRAM

## 2020-04-03 PROCEDURE — 80069 RENAL FUNCTION PANEL: CPT

## 2020-04-03 PROCEDURE — 6360000002 HC RX W HCPCS: Performed by: STUDENT IN AN ORGANIZED HEALTH CARE EDUCATION/TRAINING PROGRAM

## 2020-04-03 PROCEDURE — 99233 SBSQ HOSP IP/OBS HIGH 50: CPT | Performed by: INTERNAL MEDICINE

## 2020-04-03 PROCEDURE — 93320 DOPPLER ECHO COMPLETE: CPT

## 2020-04-03 PROCEDURE — 83615 LACTATE (LD) (LDH) ENZYME: CPT

## 2020-04-03 PROCEDURE — 97161 PT EVAL LOW COMPLEX 20 MIN: CPT

## 2020-04-03 PROCEDURE — 2580000003 HC RX 258: Performed by: INTERNAL MEDICINE

## 2020-04-03 PROCEDURE — 99232 SBSQ HOSP IP/OBS MODERATE 35: CPT | Performed by: INTERNAL MEDICINE

## 2020-04-03 PROCEDURE — 97165 OT EVAL LOW COMPLEX 30 MIN: CPT

## 2020-04-03 PROCEDURE — 97535 SELF CARE MNGMENT TRAINING: CPT

## 2020-04-03 PROCEDURE — 93308 TTE F-UP OR LMTD: CPT

## 2020-04-03 RX ORDER — AMLODIPINE BESYLATE 2.5 MG/1
2.5 TABLET ORAL DAILY
Status: DISCONTINUED | OUTPATIENT
Start: 2020-04-03 | End: 2020-04-04 | Stop reason: HOSPADM

## 2020-04-03 RX ORDER — 0.9 % SODIUM CHLORIDE 0.9 %
500 INTRAVENOUS SOLUTION INTRAVENOUS ONCE
Status: COMPLETED | OUTPATIENT
Start: 2020-04-03 | End: 2020-04-03

## 2020-04-03 RX ADMIN — PRAVASTATIN SODIUM 40 MG: 40 TABLET ORAL at 09:45

## 2020-04-03 RX ADMIN — AMLODIPINE BESYLATE 2.5 MG: 2.5 TABLET ORAL at 09:45

## 2020-04-03 RX ADMIN — PANTOPRAZOLE SODIUM 40 MG: 40 TABLET, DELAYED RELEASE ORAL at 05:59

## 2020-04-03 RX ADMIN — CEFEPIME 2 G: 2 INJECTION, POWDER, FOR SOLUTION INTRAVENOUS at 09:35

## 2020-04-03 RX ADMIN — INSULIN LISPRO 2 UNITS: 100 INJECTION, SOLUTION INTRAVENOUS; SUBCUTANEOUS at 12:45

## 2020-04-03 RX ADMIN — SODIUM CHLORIDE 500 ML: 9 INJECTION, SOLUTION INTRAVENOUS at 10:36

## 2020-04-03 RX ADMIN — Medication 10 ML: at 09:35

## 2020-04-03 RX ADMIN — ACETAMINOPHEN 650 MG: 325 TABLET ORAL at 12:38

## 2020-04-03 RX ADMIN — Medication 10 ML: at 20:47

## 2020-04-03 RX ADMIN — CEFEPIME 2 G: 2 INJECTION, POWDER, FOR SOLUTION INTRAVENOUS at 15:44

## 2020-04-03 ASSESSMENT — ENCOUNTER SYMPTOMS
BLOOD IN STOOL: 0
VOMITING: 0
SORE THROAT: 0
NAUSEA: 0
CHEST TIGHTNESS: 0
DIARRHEA: 0
ABDOMINAL PAIN: 0
COUGH: 1
SHORTNESS OF BREATH: 0

## 2020-04-03 ASSESSMENT — PAIN SCALES - GENERAL
PAINLEVEL_OUTOF10: 0

## 2020-04-03 NOTE — PROGRESS NOTES
Occupational Therapy   Occupational Therapy Initial Assessment/Treatment  Date: 4/3/2020   Patient Name: Kasia Chang  MRN: [id number]     : 1935    Date of Service: 4/3/2020    Discharge Recommendations:    Kasia Chang scored a 24/24 on the AM-PAC ADL Inpatient form. Current research shows that an AM-PAC score of 18 or greater is typically associated with a discharge to the patient's home setting. Based on the patients AM-PAC score and their current ADL deficits, it is recommended that the patient have 2-3 sessions per week of Occupational Therapy at d/c to increase the patients independence. If patient discharges prior to next session this note will serve as a discharge summary. Please see below for the latest assessment towards goals. OT Equipment Recommendations  Equipment Needed: No    Assessment   Assessment: Pt demonstrates good functional independence. No acute OT needs identified. Pt expresses no concerns regarding discharge to home. Prognosis: Good  Decision Making: Low Complexity  Patient Education: Role of OT:  Pt verbalized understanding  REQUIRES OT FOLLOW UP: No  Activity Tolerance  Activity Tolerance: Patient Tolerated treatment well  Safety Devices  Safety Devices in place: Yes  Type of devices: Left in chair;Call light within reach; Chair alarm in place;Nurse notified              Restrictions  Position Activity Restriction  Other position/activity restrictions: Up as tolerated    Subjective   General  Chart Reviewed: Yes  Patient assessed for rehabilitation services?: Yes  Additional Pertinent Hx: Pt admitted 3/31/2020 with acute onset vomiting and fever. Patient is concerned she may have food poisoning.          PMH includes: cervical Ca-surgery, essential HTN, DM, aortic stenosis, MI, peptic ulcer, trigger finger, asthma, back surgery, aortic valve replacement  Family / Caregiver Present: No  Referring Practitioner: Dr. Rey Cano  Diagnosis: Strength  Gross LUE Strength: WNL  RUE Strength  Gross RUE Strength: WNL     Hand Dominance  Hand Dominance: Right     Treatment included ADL and transfer training.         Plan   Plan  Plan Comment: Discharge pt from acute    AM-PAC Score        AM-[facility] Inpatient Daily Activity Raw Score: 24 (04/03/20 1401)  AM-PAC Inpatient ADL T-Scale Score : 57.54 (04/03/20 1401)  ADL Inpatient CMS 0-100% Score: 0 (04/03/20 1401)  ADL Inpatient CMS G-Code Modifier : CH (04/03/20 1401)    Goals    No goals indicated    Therapy Time   Individual Concurrent Group Co-treatment   Time In 1332         Time Out 1355         Minutes 23         Timed Code Treatment Minutes: 15 Minutes    Total Treatment [address], OTR/L [id number]

## 2020-04-03 NOTE — CARE COORDINATION
Case Management Assessment           Daily Note                 Date/ Time of Note: 4/3/2020 2:51 PM         Note completed by: Shlaini Meredith    Patient Name: Barbi Vitale  YOB: 1935    Diagnosis:Fever and chills [R50.9]  Fever and chills [R50.9]  Patient Admission Status: Inpatient    Date of Admission:3/31/2020  6:04 PM Length of Stay: 3 GLOS:        Current Plan of Care: echo pending, awaiting cultures for abx recommendations   ________________________________________________________________________________________  PT AM-PAC: 23 / 24 per last evaluation on: 4.3    OT AM-PAC: 24 / 24 per last evaluation on: 4.3    DME Needs for discharge: n/a    ________________________________________________________________________________________  Discharge Plan: Home    Tentative discharge date: TBD    Current barriers to discharge: medical clearance    Referrals completed: Not Applicable    Resources/ information provided: Not indicated at this time  ________________________________________________________________________________________  Case Management Notes:  Patient is form home alone, plans to return home at discharge. No CM needs at this time. Family to transport at discharge. Ada and her family were provided with choice of provider; she and her family are in agreement with the discharge plan.     Care Transition Patient: Yes    Shalini Meredith RN  [facility]  Case Management Department  Ph: [phone]  Fax: [phone]
along with the prescription(s)  4. Cost of Rx cannot be added to hospital bill. If financial assistance is needed, please contact unit  or ;  or  CANNOT provide pharmacy voucher for patients co-pays  5. Patients can then  the prescription on their way out of the hospital at discharge, or pharmacy can deliver to the bedside if staff is available. (payment due at time of pick-up or delivery - cash, check, or card accepted)     Able to afford home medications/ co-pay costs: Yes    ADLS  Support Systems:      PT AM-PAC:   /24  OT AM-PAC:   /24    [place]: 1 level home  Steps: 0    Plans to RETURN to current housing: Yes  Barriers to RETURNING to current housing: none    Lena [address] 78  Currently ACTIVE with [address]: No  Home Care Agency: Not Applicable          Durable Medical Equipment  DME Provider: n/a  Equipment: n/a    Home Oxygen and [address] prior to admission: No  [address]: Not Applicable      Dialysis  Active with HD/PD prior to admission: No  Nephrologist:     HD Center:  Not Applicable    DISCHARGE PLAN:  Disposition: Home- No Services Needed    Transportation PLAN for discharge: family     Factors facilitating achievement of predicted outcomes: Family support    Barriers to discharge: Medical complications    Additional Case Management Notes: Patient is from home alone, ranch home, independent pta. No CM needs at this time. Family to transport home at discharge.     The Plan for Transition of Care is related to the following treatment goals of Fever and chills [R50.9]  Fever and chills [R50.9]    The Patient and/or patient representative Ada and her family were provided with a choice of provider and agrees with the discharge plan Yes    Freedom of choice list was provided with basic dialogue that supports the patient's individualized plan of care/goals and shares the quality data

## 2020-04-03 NOTE — PLAN OF CARE
Problem: Falls - Risk of:  Goal: Will remain free from falls  Description: Will remain free from falls  Outcome: Ongoing  Note: Pt is a Med fall risk. See Uma Fake Fall Score and ABCDS Injury Risk assessments. - Screening for Orthostasis AND not a Spencer Risk per WEI/ABCDS: Pt bed is in low position, side rails up, call light and belongings are in reach. Fall risk light is on outside pts room. Pt encouraged to call for assistance as needed. Will continue with hourly rounds for PO intake, pain needs, toileting and repositioning as needed. Problem: Gas Exchange - Impaired:  Goal: Levels of oxygenation will improve  Description: Levels of oxygenation will improve  4/3/2020 1133 by Davi Servant  Outcome: Ongoing  Note: Patient maintaining Spo2 > 93% on room air, slight dyspnea with exertion noted upon assessment. Lung fields diminished upon auscultation. Continue to monitor.

## 2020-04-03 NOTE — PROGRESS NOTES
Progress Note    Admit Date: 3/31/2020  Day: 4/3/2020   Diet: DIET GENERAL; Carb Control: 5 carb choices (75 gms)/meal    CC: fever    Interval history: Emergent disease panel neg for COVID 19. Afebrile for 24 hrs. Blood pressures have come up to SBPs 180's. She generally feels \"weak and fatigued\" she has some dyspnea with exertion. Medications:     Scheduled Meds:   insulin glargine  10 Units Subcutaneous Nightly    amLODIPine  2.5 mg Oral Daily    insulin lispro  0-12 Units Subcutaneous TID WC    insulin lispro  0-6 Units Subcutaneous Nightly    aspirin  81 mg Oral Daily    pantoprazole  40 mg Oral Daily    pravastatin  40 mg Oral Daily    sodium chloride flush  10 mL Intravenous 2 times per day    cefepime  2 g Intravenous Q8H     Continuous Infusions:   dextrose       PRN Meds:sodium chloride flush, glucose, dextrose, glucagon (rDNA), dextrose, acetaminophen **OR** acetaminophen    Objective:   Vitals:   T-max:  Patient Vitals for the past 8 hrs:   BP Temp Temp src Pulse Resp SpO2 Weight   04/03/20 0327 (!) 150/40 99 °F (37.2 °C) Oral 75 18 92 % --   04/03/20 0323 -- -- -- -- -- -- 172 lb 2.9 oz (78.1 kg)       Intake/Output Summary (Last 24 hours) at 4/3/2020 0747  Last data filed at 4/3/2020 0327  Gross per 24 hour   Intake 2464 ml   Output 1150 ml   Net 1314 ml       Review of Systems   Constitutional: Positive for chills and fever. Negative for activity change, appetite change and unexpected weight change. HENT: Negative for sore throat. Eyes: Negative for visual disturbance. Respiratory: Positive for cough (chronic ). Negative for chest tightness and shortness of breath. Cardiovascular: Negative for chest pain. Gastrointestinal: Negative for abdominal pain, blood in stool, diarrhea, nausea and vomiting. Skin: Negative for rash. Allergic/Immunologic: Negative for immunocompromised state. Neurological: Positive for weakness. Negative for dizziness and headaches.

## 2020-04-03 NOTE — PROGRESS NOTES
Per patient she will have Dr. Krissy Gibbs call her daughter when he does round today to give upddates on plan of care.

## 2020-04-03 NOTE — PROGRESS NOTES
Physical Therapy    Facility/Department: [place] [address] [facility]  Initial Assessment and treatment/discharge    NAME: Sj Clarke  : 1935  MRN: [id number]    Date of Service: 4/3/2020    Discharge Recommendations:    Sj Clarke scored a 23/24 on the AM-PAC short mobility form. At this time, no further PT is recommended upon discharge. Recommend patient returns to prior setting with prior services. PT Equipment Recommendations  Equipment Needed: No    Assessment   Assessment: Patient tolerated session well, transferring and ambulating in room and bathroom with steady gait without an assistive device. Patient appears to be functioning near baseline level and denies concerns regarding d/c home alone. No acute care needs at this time; will sign off from PT. Recommend d/c home with PRN assist from family. Treatment Diagnosis: impaired mobility associated with sepsis  Prognosis: Good  Decision Making: Low Complexity  Patient Education: Educated on role of PT, safety with mobility, use of call light, d/c recommendations; patient verbalized understanding. No Skilled PT: Safe to return home  REQUIRES PT FOLLOW UP: No  Activity Tolerance  Activity Tolerance: Patient Tolerated treatment well       Patient Diagnosis(es): The primary encounter diagnosis was Nausea and vomiting, intractability of vomiting not specified, unspecified vomiting type. Diagnoses of Fever, unspecified fever cause and Fever and chills were also pertinent to this visit.      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, Aortic stenosis, Benign neoplasm of colon, Cancer ([address] Carcinoma in situ of cervix uteri, Coronary atherosclerosis of unspecified type of vessel, native or graft, Essential hypertension, benign, History of kidney stones, Internal hemorrhoids without mention of complication, MI, old, Other and unspecified hyperlipidemia, Palpitations, without an assistive device)        Plan   Plan  Times per week: d/c from acute care PT  Safety Devices  Type of devices: Left in chair, Call light within reach, Nurse notified, Gait belt    OutComes Score                                                  AM-PAC Score  AM-PAC Inpatient Mobility Raw Score : 23 (04/03/20 1421)  AM-PAC Inpatient T-Scale Score : 56.93 (04/03/20 1421)  Mobility Inpatient CMS 0-100% Score: 11.2 (04/03/20 1421)  Mobility Inpatient CMS G-Code Modifier : CI (04/03/20 1421)          Goals  Short term goals  Time Frame for Short term goals: none; patient will be discharged from acute care PT  Patient Goals   Patient goals : to go home       Therapy Time   Individual Concurrent Group Co-treatment   Time In 1330         Time Out 1354         Minutes 24         Timed Code Treatment Minutes: 9 Minutes    Timed Code Treatment Minutes:  9 Minutes    Total Treatment Minutes:    9 minutes treatment + 15 minutes evaluation = 24 total treatment minutes    [address]

## 2020-04-04 VITALS
WEIGHT: 172.4 LBS | BODY MASS INDEX: 29.43 KG/M2 | HEART RATE: 64 BPM | SYSTOLIC BLOOD PRESSURE: 137 MMHG | OXYGEN SATURATION: 96 % | DIASTOLIC BLOOD PRESSURE: 37 MMHG | RESPIRATION RATE: 18 BRPM | HEIGHT: 64 IN | TEMPERATURE: 97.9 F

## 2020-04-04 LAB
ALBUMIN SERPL-MCNC: 3.4 G/DL (ref 3.4–5)
ANION GAP SERPL CALCULATED.3IONS-SCNC: 11 MMOL/L (ref 3–16)
BASOPHILS ABSOLUTE: 0.1 K/UL (ref 0–0.2)
BASOPHILS RELATIVE PERCENT: 0.9 %
BLOOD CULTURE, ROUTINE: ABNORMAL
BLOOD CULTURE, ROUTINE: ABNORMAL
BUN BLDV-MCNC: 7 MG/DL (ref 7–20)
CALCIUM SERPL-MCNC: 8.8 MG/DL (ref 8.3–10.6)
CHLORIDE BLD-SCNC: 102 MMOL/L (ref 99–110)
CO2: 24 MMOL/L (ref 21–32)
CREAT SERPL-MCNC: <0.5 MG/DL (ref 0.6–1.2)
CULTURE, BLOOD 2: ABNORMAL
CULTURE, BLOOD 2: ABNORMAL
EOSINOPHILS ABSOLUTE: 0.1 K/UL (ref 0–0.6)
EOSINOPHILS RELATIVE PERCENT: 2.5 %
GFR AFRICAN AMERICAN: >60
GFR NON-AFRICAN AMERICAN: >60
GLUCOSE BLD-MCNC: 133 MG/DL (ref 70–99)
GLUCOSE BLD-MCNC: 137 MG/DL (ref 70–99)
HCT VFR BLD CALC: 33.3 % (ref 36–48)
HEMOGLOBIN: 11.8 G/DL (ref 12–16)
LYMPHOCYTES ABSOLUTE: 0.8 K/UL (ref 1–5.1)
LYMPHOCYTES RELATIVE PERCENT: 14.2 %
MCH RBC QN AUTO: 32.4 PG (ref 26–34)
MCHC RBC AUTO-ENTMCNC: 35.6 G/DL (ref 31–36)
MCV RBC AUTO: 91.2 FL (ref 80–100)
MONOCYTES ABSOLUTE: 0.6 K/UL (ref 0–1.3)
MONOCYTES RELATIVE PERCENT: 9.7 %
NEUTROPHILS ABSOLUTE: 4.3 K/UL (ref 1.7–7.7)
NEUTROPHILS RELATIVE PERCENT: 72.7 %
ORGANISM: ABNORMAL
PDW BLD-RTO: 13.7 % (ref 12.4–15.4)
PERFORMED ON: ABNORMAL
PHOSPHORUS: 2 MG/DL (ref 2.5–4.9)
PLATELET # BLD: 53 K/UL (ref 135–450)
PMV BLD AUTO: 10 FL (ref 5–10.5)
POTASSIUM SERPL-SCNC: 3.8 MMOL/L (ref 3.5–5.1)
RBC # BLD: 3.65 M/UL (ref 4–5.2)
SODIUM BLD-SCNC: 137 MMOL/L (ref 136–145)
WBC # BLD: 5.9 K/UL (ref 4–11)

## 2020-04-04 PROCEDURE — 36415 COLL VENOUS BLD VENIPUNCTURE: CPT

## 2020-04-04 PROCEDURE — 6370000000 HC RX 637 (ALT 250 FOR IP): Performed by: STUDENT IN AN ORGANIZED HEALTH CARE EDUCATION/TRAINING PROGRAM

## 2020-04-04 PROCEDURE — 80069 RENAL FUNCTION PANEL: CPT

## 2020-04-04 PROCEDURE — 99239 HOSP IP/OBS DSCHRG MGMT >30: CPT | Performed by: INTERNAL MEDICINE

## 2020-04-04 PROCEDURE — 6360000002 HC RX W HCPCS: Performed by: STUDENT IN AN ORGANIZED HEALTH CARE EDUCATION/TRAINING PROGRAM

## 2020-04-04 PROCEDURE — 2580000003 HC RX 258: Performed by: STUDENT IN AN ORGANIZED HEALTH CARE EDUCATION/TRAINING PROGRAM

## 2020-04-04 PROCEDURE — 85025 COMPLETE CBC W/AUTO DIFF WBC: CPT

## 2020-04-04 RX ORDER — CIPROFLOXACIN 500 MG/1
500 TABLET, FILM COATED ORAL 2 TIMES DAILY
Qty: 20 TABLET | Refills: 0 | Status: CANCELLED | OUTPATIENT
Start: 2020-04-04 | End: 2020-04-14

## 2020-04-04 RX ORDER — CIPROFLOXACIN 500 MG/1
500 TABLET, FILM COATED ORAL 2 TIMES DAILY
Qty: 20 TABLET | Refills: 0 | Status: SHIPPED | OUTPATIENT
Start: 2020-04-04 | End: 2020-04-14

## 2020-04-04 RX ADMIN — ASPIRIN 81 MG: 81 TABLET, COATED ORAL at 09:12

## 2020-04-04 RX ADMIN — Medication 10 ML: at 09:13

## 2020-04-04 RX ADMIN — CEFEPIME 2 G: 2 INJECTION, POWDER, FOR SOLUTION INTRAVENOUS at 09:13

## 2020-04-04 RX ADMIN — AMLODIPINE BESYLATE 2.5 MG: 2.5 TABLET ORAL at 09:12

## 2020-04-04 RX ADMIN — PRAVASTATIN SODIUM 40 MG: 40 TABLET ORAL at 09:12

## 2020-04-04 RX ADMIN — PANTOPRAZOLE SODIUM 40 MG: 40 TABLET, DELAYED RELEASE ORAL at 09:12

## 2020-04-04 RX ADMIN — CEFEPIME 2 G: 2 INJECTION, POWDER, FOR SOLUTION INTRAVENOUS at 00:11

## 2020-04-04 ASSESSMENT — PAIN SCALES - GENERAL
PAINLEVEL_OUTOF10: 0

## 2020-04-04 NOTE — DISCHARGE SUMMARY
INTERNAL MEDICINE DEPARTMENT AT [address] [place] [address]  DISCHARGE SUMMARY    Patient ID: Elbert Tejeda                                             Discharge Date: 4/4/2020   Patient's PCP: Zion Johnson MD                                          Discharge Physician: Zion Johnson DO  Admit Date: 3/31/2020   Admitting Physician: Zion Johnson MD    Edited by me as resident is restricted with seeing the pt     DISCHARGE DIAGNOSES:  Sepsis  secondary to Pseudomonas bacteremia  Acute on Chronic Thrombocytopenia  Diabetes mellitus type 2 uncontrolled  Aortic insufficiency status post AVR  Hypertension  Hyperlipidemia  GERD    Hospital Course: This is an 60-year-old female history of aortic valve replacement with bioprosthetic valve, aortic insufficiency, GERD MI in 2007, hypertension, previous history of bacteremia most recently strep. She presented to the hospital with fever and chills, as well as chronic cough. She was tested for COVID 19, it was undetected. Blood cultures were positive for pseudomonas aeruginosa. She was treated with cefepime IV. Infectious disease was consulted. She will be sent home to complete a 14-day course of antibiotics with Ciprofloxacin. Today she feels well  Pt denies CP/SOB/palpitations/abdominal pain/N/V. No bleeding, no fever/ chills. Weakness have resolved, appetite is good. Still mild cough a little more than base line but better than yesterday, minimal phlegm. She still have thrombocytopenia and some evidence of intravascault hemolysis but number are not impressive (INR, LDH), no anemia so I will repeat CBC in 2 days to monitor- I will review that. Discussed with pt the treatment , prognosis, the need to do Gi work up down the road and Sx of worsening of infection/ thrombocytopenia. Plan also discussed with her daughter and resident. Also of note AV velocity> 4 m/s--. She will f/u with her cardiologist as an outpatient.      BC sensitivities: Pseudomonas aeruginosa (2)     Antibiotic Interpretation PEBBLES Status    aztreonam Resistant >16 mcg/mL     cefepime Resistant >16 mcg/mL     cefTAZidime Resistant >16 mcg/mL     ciprofloxacin Sensitive <=1 mcg/mL     gentamicin Sensitive <=4 mcg/mL     meropenem Sensitive <=1 mcg/mL     piperacillin-tazobactam Resistant >64 mcg/mL     tobramycin Sensitive <=4 mcg/mL       Physical Exam:  BP (!) 137/37   Pulse 64   Temp 97.9 °F (36.6 °C) (Oral)   Resp 18   Ht 5' 4\" (1.626 m)   Wt 172 lb 6.4 oz (78.2 kg)   SpO2 96%   BMI 29.59 kg/m²   Constitutional:       General: She is not in acute distress. Appearance: She is well-developed. She is not diaphoretic. HENT:      Head: Normocephalic and atraumatic. Mouth/Throat:      Pharynx: No oropharyngeal exudate. Eyes:      General: No scleral icterus. Right eye: No discharge. Left eye: No discharge. Conjunctiva/sclera: Conjunctivae normal.   Neck:      Musculoskeletal: Normal range of motion and neck supple. Cardiovascular:      Rate and Rhythm: Normal rate. Pulses: Normal pulses. Heart sounds: Murmur present 3/6 RUSB LUSB  Pulmonary:      Effort: Pulmonary effort is normal. No respiratory distress. Breath sounds: Normal breath sounds. No wheezing or rales. Abdominal:      General: There is no distension. Palpations: Abdomen is soft. Tenderness: There is no abdominal tenderness. There is no guarding. Musculoskeletal:         General: No swelling, tenderness or deformity. Right lower leg: No edema. Left lower leg: No edema. Lymphadenopathy:      Head:      Right side of head: No submental or submandibular adenopathy. Left side of head: No submental or submandibular adenopathy. Cervical: No cervical adenopathy. Right cervical: No superficial or deep cervical adenopathy. Left cervical: No superficial or deep cervical adenopathy. Skin:     Findings: No rash.    Neurological: General: No focal deficit present. Cranial Nerves: Cranial nerves are grossly intact. Sensory: Sensation is intact. Motor: Motor function is intact. No abnormal muscle tone. Deep Tendon Reflexes: Reflexes are normal and symmetric. Psychiatric:         Mood and Affect: Mood normal.         Behavior: Behavior normal.         Thought Content: Thought content normal.     Consults: ID  Significant Diagnostic Studies:   XR CHEST 1 VW   Final Result      Clear lungs. Stable cardiomediastinal silhouette status post sternotomy. XR CHEST PORTABLE   Final Result      Bilateral peribronchial thickening may represent bronchitis or asthma. No focal consolidation.            Disposition: home  Discharged Condition: Stable  Follow Up: Primary Care Physician in two weeks    DISCHARGE MEDICATION:     Medication List      START taking these medications    ciprofloxacin 500 MG tablet  Commonly known as:  CIPRO  Take 1 tablet by mouth 2 times daily for 10 days     Dextromethorphan-guaiFENesin  MG Caps  Take 1 capsule by mouth 2 times daily as needed (cough)        CONTINUE taking these medications    amLODIPine 2.5 MG tablet  Commonly known as:  NORVASC  TAKE ONE TABLET BY MOUTH DAILY     aspirin 81 MG tablet     cetirizine 10 MG tablet  Commonly known as:  ZYRTEC     GLUCERNA ADVANCE SHAKE PO     GNP GINGKO BILOBA EXTRACT PO     ONE-A-DAY WOMENS 50 PLUS PO     pantoprazole 40 MG tablet  Commonly known as:  PROTONIX  TAKE ONE TABLET BY MOUTH DAILY     pravastatin 40 MG tablet  Commonly known as:  PRAVACHOL  TAKE ONE TABLET BY MOUTH DAILY     Vitamin D3 1.25 MG (75[address] UT) Caps           Where to Get Your Medications      These medications were sent to [facility], [address] [place] [address]    Phone:  [phone]   · Dextromethorphan-guaiFENesin  MG Caps     You can get these medications from any pharmacy Bring a paper prescription for each of these medications  · ciprofloxacin 500 MG tablet       Activity: activity as tolerated  Diet: diabetic diet  Wound Care: none needed    Time Spent on discharge is more than 30 minutes in care coordination  W/ resident, pt and daughter.      Anshu Ramírez,     4/4/2020

## 2020-04-04 NOTE — PROGRESS NOTES
Notified by microbiology department that pt's blood culture came back positive for multiple drug resistant pseudomonas. Dr. Megan Mackey made aware via perfect serve. Awaiting response to know whether or not to place pt in contact isolation. Will proceed once response received.  Electronically signed by Silvana Bean RN on 4/4/2020 at 8:52 AM

## 2020-04-04 NOTE — PROGRESS NOTES
Introduced self to patient. Initial PM assessment complete see flow sheet, VSS, A&Ox 4. No needs voiced at this time. POC discussed, pt verbalized understanding. Call light within reach, will continue to monitor.

## 2020-04-04 NOTE — PROGRESS NOTES
Pt discharged home. Discharge teaching provided by this RN including new medication teaching and instructions for follow up appointments. Pt stated that she had no questions at this time. IV and tele removed.  Electronically signed by Nigel Faith RN on 4/4/2020 at 11:45 am

## 2020-04-06 ENCOUNTER — TELEPHONE (OUTPATIENT)
Dept: PHARMACY | Facility: CLINIC | Age: 85
End: 2020-04-06

## 2020-04-06 ENCOUNTER — TELEPHONE (OUTPATIENT)
Dept: INFECTIOUS DISEASES | Age: 85
End: 2020-04-06

## 2020-04-06 ENCOUNTER — CARE COORDINATION (OUTPATIENT)
Dept: CASE MANAGEMENT | Age: 85
End: 2020-04-06

## 2020-04-06 PROCEDURE — 1111F DSCHRG MED/CURRENT MED MERGE: CPT

## 2020-04-06 NOTE — TELEPHONE ENCOUNTER
capsule by mouth daily    cetirizine (ZYRTEC) 10 MG tablet Take 10 mg by mouth daily as needed for Allergies or Rhinitis     aspirin 81 MG tablet Take 81 mg by mouth daily. Meds to Beds:No    CrCl cannot be calculated (This lab value cannot be used to calculate CrCl because it is not a number: <0.5). Assessment/Plan:  - Medication reconciliation completed. Number of medications reviewed: 10    - Pt is taking medications as directed by discharging physician. Number of discrepancies: 0.     - [facility] active medication list updated:  · Medications Added (0)  · Medications Removed (0)  · Medications Changed (0)    - Identified Potential Medication Interactions: No clinically significant interactions identified via [employer] Interaction Analysis as category D or higher.    - Renal Dosing: No renal adjustments necessary.    - Follow up appointment date (7 days for more severe illness, 14 days for others):    · Patient reminded of upcoming appointment with PCP on 6/8/2020. Pt reports that she is due for blood work this week.      Thank you,    Sheri Frye, PharmD, [facility]  Direct: [phone]  Department, toll free [phone], option 7          For Pharmacy Admin Tracking Only    TCM Call Made?: Yes  [address] Patient?: Yes  Total # of Interventions Recommended: 0  Total # Interventions Accepted: 0  Intervention Severity:   - Level 1 Intervention Present?: No   - Level 2 #: 0   - Level 3 #: 0  Outreach Status: Review Complete  Care Coordinator Outreach to Patient?: No  Provider Contacted?: No  Time Spent (min): 30

## 2020-04-06 NOTE — TELEPHONE ENCOUNTER
Patient called as she stated Dr. Darryl Moyer wanted her to call in to report how doing since discharged from hospital. Patient stated she went to bed at 9 pm last night and then up at 8 am and feels like a new women. Her appetite has returned.

## 2020-04-07 ENCOUNTER — CARE COORDINATION (OUTPATIENT)
Dept: CASE MANAGEMENT | Age: 85
End: 2020-04-07

## 2020-04-07 NOTE — CARE COORDINATION
due to risk factors for infection and/or exposure to COVID-19. Symptoms reviewed with Pt  Fever -no   Fatigue-no   Pain or aching joints -no  Cough -yes, not worse since d/c   Shortness of breath -no  Confusion or unusual change in mental status  -no  Chills or shaking -no   Sweating -no   Fast heart rate -no   Fast breathing -no   Dizziness/lightheadedness -no   Less urine output -no   Cold, clammy, and pale skin -no  Low body temperature -no      Pt states she is feeling fine and reports NP cough and night sweats. Pt denies that s/s have worsened since discharging home. CTN/ACM reviewed discharge instructions, medical action plan and red flags such as increased shortness of breath, increasing fever and signs of decompensation with Pt  who verbalized understanding. Discussed exposure protocols and quarantine with CDC Guidelines What to do if you are sick with coronavirus disease 2019  Pt who was given an opportunity for questions and concerns. The Pt agrees to contact the [facility] exposure line, local health department and PCP office for questions related to their healthcare. CTN provided contact information for future reference and [facility] exposure line contact number. From CDC: Are you at higher risk for severe illness?  Wash your hands often.  Avoid close contact (6 feet, which is about two arm lengths) with people who are sick.  Put distance between yourself and other people if COVID-19 is spreading in your community.  Clean and disinfect frequently touched surfaces.  Avoid all cruise travel and non-essential air travel.  Call your healthcare professional if you have concerns about COVID-19 and your underlying condition or if you are sick.     For more information on steps you can take to protect yourself, see CDC's How to Protect Yourself        Reviewed and educated Pt on any new and changed medications related to discharge diagnosis     Plan for follow-up call in 3-5 days

## 2020-04-08 DIAGNOSIS — D69.6 THROMBOCYTOPENIA (HCC): Chronic | ICD-10-CM

## 2020-04-08 LAB
BASOPHILS ABSOLUTE: 0 K/UL (ref 0–0.2)
BASOPHILS RELATIVE PERCENT: 0.7 %
EOSINOPHILS ABSOLUTE: 0.3 K/UL (ref 0–0.6)
EOSINOPHILS RELATIVE PERCENT: 3.9 %
HCT VFR BLD CALC: 40 % (ref 36–48)
HEMOGLOBIN: 13.6 G/DL (ref 12–16)
LYMPHOCYTES ABSOLUTE: 1.3 K/UL (ref 1–5.1)
LYMPHOCYTES RELATIVE PERCENT: 19.3 %
MCH RBC QN AUTO: 31.6 PG (ref 26–34)
MCHC RBC AUTO-ENTMCNC: 34 G/DL (ref 31–36)
MCV RBC AUTO: 92.9 FL (ref 80–100)
MONOCYTES ABSOLUTE: 0.5 K/UL (ref 0–1.3)
MONOCYTES RELATIVE PERCENT: 8.3 %
NEUTROPHILS ABSOLUTE: 4.4 K/UL (ref 1.7–7.7)
NEUTROPHILS RELATIVE PERCENT: 67.8 %
PDW BLD-RTO: 14.2 % (ref 12.4–15.4)
PLATELET # BLD: 111 K/UL (ref 135–450)
PMV BLD AUTO: 9.6 FL (ref 5–10.5)
RBC # BLD: 4.31 M/UL (ref 4–5.2)
WBC # BLD: 6.5 K/UL (ref 4–11)

## 2020-04-10 ENCOUNTER — CARE COORDINATION (OUTPATIENT)
Dept: CASE MANAGEMENT | Age: 85
End: 2020-04-10

## 2020-04-10 NOTE — CARE COORDINATION
your hands often.  Avoid close contact (6 feet, which is about two arm lengths) with people who are sick.  Put distance between yourself and other people if COVID-19 is spreading in your community.  Clean and disinfect frequently touched surfaces.  Avoid all cruise travel and non-essential air travel.  Call your healthcare professional if you have concerns about COVID-19 and your underlying condition or if you are sick. For more information on steps you can take to protect yourself, see CDC's How to [place] for follow-up call in 3-5 days based on severity of symptoms and risk factors.   Pt declined to enroll in [facility] Program.     Follow Up  Future Appointments   Date Time Provider Lottie Morse   6/8/2020  9:00 AM MD UMAIR Trent [facility]       Ishan Limon RN

## 2020-04-13 ENCOUNTER — CARE COORDINATION (OUTPATIENT)
Dept: CASE MANAGEMENT | Age: 85
End: 2020-04-13

## 2020-04-13 NOTE — CARE COORDINATION
[facility] Transitions Follow Up Call    2020    Patient: Jasper Mead  Patient : 1935   MRN: [id number]   Reason for Admission:  covid 19 monitoring  Discharge Date: 20 RARS: Readmission Risk Score: 14         Spoke with: [address] Transitions Subsequent and Final Call    Subsequent and Final Calls  Do you have any ongoing symptoms?:  No  Have your medications changed?:  No  Do you have any questions related to your medications?:  No  Do you currently have any active services?:  No  Are you currently active with any services?:  Home Health  Do you have any needs or concerns that I can assist you with?:  No  Identified Barriers:  None  Care Transitions Interventions  Other Interventions:          SUMMARY  CTC spoke to the Pt who denies fever, chills, nausea, vomiting, chest pain, SOB, cough, congestion, pain, and difficulty emptying bladder. Pt states she is \"fine and dandy\" and denies any concerns at this time. CTC encouraged Pt to avoid crowds and sick individuals and to practice good hand hygiene. Pt will take all meds as prescribed and schedule / keep doctors appt. CTC provided education on s/s that require medical attention and when to seek medical attention. CTC advised Pt of use urgent care or physicians 24 hr access line if assistance is needed after hours or on the weekend. Pt denies any needs or concerns and is agreeable with additional calls.     Follow Up  Future Appointments   Date Time Provider Lottie Morse   2020  9:00 AM MD UMAIR Velez [address] [facility]       Mario Alberto Nevarez RN

## 2020-04-15 ENCOUNTER — CARE COORDINATION (OUTPATIENT)
Dept: CASE MANAGEMENT | Age: 85
End: 2020-04-15

## 2020-04-17 ENCOUNTER — CARE COORDINATION (OUTPATIENT)
Dept: CASE MANAGEMENT | Age: 85
End: 2020-04-17

## 2020-04-20 ENCOUNTER — CARE COORDINATION (OUTPATIENT)
Dept: CASE MANAGEMENT | Age: 85
End: 2020-04-20

## 2020-04-29 ENCOUNTER — CARE COORDINATION (OUTPATIENT)
Dept: CASE MANAGEMENT | Age: 85
End: 2020-04-29

## 2020-04-29 NOTE — CARE COORDINATION
[facility] Transitions Follow Up Call    2020    Patient: Re Walsh  Patient : 1935   MRN: [id number]   Reason for Admission:    Discharge Date: 20 RARS: Readmission Risk Score: 14         Spoke with: [address] Transitions Subsequent and Final Call    Subsequent and Final Calls  Do you have any ongoing symptoms?:  No  Have your medications changed?:  No  Do you have any questions related to your medications?:  No  Do you currently have any active services?:  No  Are you currently active with any services?:  Home Health  Do you have any needs or concerns that I can assist you with?:  No  Identified Barriers:  None  Care Transitions Interventions  Other Interventions:          SUMMARY  CTC spoke to the Pt who denies fever, chills, nausea, vomiting, chest pain, SOB, cough, congestion, pain, and difficulty emptying bladder. Pt states that she is fighting a slight vaginal yeast infection since she was on antibiotics and CTC advised her she could try Monistat 7 OTC. CTC encouraged Pt to avoid crowds and sick individuals and to practice good hand hygiene. Pt will take all meds as prescribed and schedule / keep doctors appt. CTC provided education on s/s that require medical attention and when to seek medical attention. CTC advised Pt of use urgent care or physicians 24 hr access line if assistance is needed after hours or on the weekend. Pt denies any needs or concerns and is agreeable with additional calls.   Pt denies any needs or concerns and is agreeable with additional calls    Follow Up  Future Appointments   Date Time Provider Lottie Morse   2020  9:00 AM Shayla Grossman MD [facility] [address] [facility]       Rodrigo Manley RN

## 2020-05-04 ENCOUNTER — CARE COORDINATION (OUTPATIENT)
Dept: CASE MANAGEMENT | Age: 85
End: 2020-05-04

## 2020-05-20 ENCOUNTER — HOSPITAL ENCOUNTER (OUTPATIENT)
Dept: ULTRASOUND IMAGING | Age: 85
Discharge: HOME | End: 2020-05-20
Payer: MEDICARE

## 2020-05-20 PROCEDURE — 76705 ECHO EXAM OF ABDOMEN: CPT

## 2020-06-08 ENCOUNTER — OFFICE VISIT (OUTPATIENT)
Dept: INTERNAL MEDICINE CLINIC | Age: 85
End: 2020-06-08
Payer: MEDICARE

## 2020-06-08 VITALS
BODY MASS INDEX: 27.64 KG/M2 | DIASTOLIC BLOOD PRESSURE: 72 MMHG | TEMPERATURE: 97.3 F | SYSTOLIC BLOOD PRESSURE: 126 MMHG | WEIGHT: 161 LBS

## 2020-06-08 PROCEDURE — 99214 OFFICE O/P EST MOD 30 MIN: CPT | Performed by: INTERNAL MEDICINE

## 2020-06-08 RX ORDER — AMLODIPINE BESYLATE 2.5 MG/1
2.5 TABLET ORAL DAILY
Qty: 90 TABLET | Refills: 5 | Status: SHIPPED | OUTPATIENT
Start: 2020-06-08 | End: 2020-12-16 | Stop reason: SDUPTHER

## 2020-06-08 SDOH — ECONOMIC STABILITY: FOOD INSECURITY: WITHIN THE PAST 12 MONTHS, YOU WORRIED THAT YOUR FOOD WOULD RUN OUT BEFORE YOU GOT MONEY TO BUY MORE.: PATIENT DECLINED

## 2020-06-08 SDOH — ECONOMIC STABILITY: FOOD INSECURITY: WITHIN THE PAST 12 MONTHS, THE FOOD YOU BOUGHT JUST DIDN'T LAST AND YOU DIDN'T HAVE MONEY TO GET MORE.: PATIENT DECLINED

## 2020-06-08 SDOH — ECONOMIC STABILITY: TRANSPORTATION INSECURITY
IN THE PAST 12 MONTHS, HAS LACK OF TRANSPORTATION KEPT YOU FROM MEETINGS, WORK, OR FROM GETTING THINGS NEEDED FOR DAILY LIVING?: PATIENT DECLINED

## 2020-06-08 SDOH — ECONOMIC STABILITY: TRANSPORTATION INSECURITY
IN THE PAST 12 MONTHS, HAS THE LACK OF TRANSPORTATION KEPT YOU FROM MEDICAL APPOINTMENTS OR FROM GETTING MEDICATIONS?: PATIENT DECLINED

## 2020-06-08 ASSESSMENT — ENCOUNTER SYMPTOMS
ABDOMINAL PAIN: 0
COUGH: 0
NAUSEA: 0
VOMITING: 0
CHEST TIGHTNESS: 0
CONSTIPATION: 0
SHORTNESS OF BREATH: 0
DIARRHEA: 0

## 2020-06-08 ASSESSMENT — PATIENT HEALTH QUESTIONNAIRE - PHQ9
SUM OF ALL RESPONSES TO PHQ QUESTIONS 1-9: 0
SUM OF ALL RESPONSES TO PHQ9 QUESTIONS 1 & 2: 0
1. LITTLE INTEREST OR PLEASURE IN DOING THINGS: 0
SUM OF ALL RESPONSES TO PHQ QUESTIONS 1-9: 0
2. FEELING DOWN, DEPRESSED OR HOPELESS: 0

## 2020-06-08 NOTE — PROGRESS NOTES
Outpatient Note for established Patient - regular Visit     History Obtained From:  patient, electronic medical record  Is the patient new to me ? - No    HISTORY OF PRESENT ILLNESS:   The patient is a 80 y.o. female who is here today for :  Sanchez Lance was seen today for other. Diagnoses and all orders for this visit:    pt feels well. Pt denies CP/SOB/palpitations/abdominal pain/N/V. No fever/ chills/ cough   Pt was recent pseudomonas bacteremia   After hs admission she had yeast infection which was treated- now she is asymptomatic  She takes Mg supp and feels well. She has her GI Dr Dav Bell. Hx of bioprosthetic AVR on aspirin  Blood pressure today is well controlled 126/72 patient is on amlodipine  No reported medication side effects  Most recent CBC showed better platelet count of 174. Patient has chronic mild thrombocytopenia. Lab Results   Component Value Date    LABA1C 5.4 12/06/2019     Lab Results   Component Value Date    .3 12/06/2019   she used to be on Victoza- she is not taking currently.    FBG in the 110-120 mg%      Past Medical History:        Diagnosis Date    Allergic rhinitis, cause unspecified     Allergic urticaria     Angioneurotic edema not elsewhere classified     Aortic stenosis 12/9/2011    dx 11/2007 - echo - LVEF 55% , AV area 1.5 cm , peak velocity - 27 ECHO 6/2009 - LVEF 60%, AV area 1.3 cm , peak velocity 35  ECHO 12/1/2011 - LVEF -60-65%, AV area 0.99 cm , peak velocity 34  asymptomatic      Benign neoplasm of colon     Cancer ([facility] [address]     cervical 1964    Carcinoma in situ of cervix uteri     Coronary atherosclerosis of unspecified type of vessel, native or graft     Essential hypertension, benign     History of kidney stones     Internal hemorrhoids without mention of complication     MI, old 2007    Other and unspecified hyperlipidemia     Palpitations     Peptic ulcer, unspecified site, unspecified as acute or chronic, without mention of hemorrhage, perforation, or obstruction     Sebaceous cyst of breast 5/31/2012    Trigger finger 1/8/2013    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 She has never used smokeless tobacco.      ETOH:   reports current alcohol use. Current Medications:    Prior to Admission medications    Medication Sig Start Date End Date Taking? Authorizing Provider   amLODIPine (NORVASC) 2.5 MG tablet Take 1 tablet by mouth daily 6/8/20  Yes Dipak Nicole MD   Ginkgo Biloba (GNP GINGKO BILOBA EXTRACT PO) Take 1 capsule by mouth daily    Yes Historical Provider, MD   Cholecalciferol (VITAMIN D3) 1.25 MG (77643 UT) CAPS Take by mouth Take once a week (on Thursday's )   Yes Historical Provider, MD   pravastatin (PRAVACHOL) 40 MG tablet TAKE ONE TABLET BY MOUTH DAILY 2/20/20  Yes Dipak Nicole MD   pantoprazole (PROTONIX) 40 MG tablet TAKE ONE TABLET BY MOUTH DAILY 12/26/19  Yes Dipak Nicole MD   Nutritional Supplements (GLUCERNA ADVANCE SHAKE PO) Take 1 each by mouth daily   Yes Historical Provider, MD   Multiple Vitamins-Minerals (ONE-A-DAY WOMENS 50 PLUS PO) Take 1 capsule by mouth daily   Yes Historical Provider, MD   cetirizine (ZYRTEC) 10 MG tablet Take 10 mg by mouth daily as needed for Allergies or Rhinitis    Yes Historical Provider, MD   aspirin 81 MG tablet Take 81 mg by mouth daily. Yes Historical Provider, MD       REVIEW OF SYSTEMS:  Review of Systems   Constitutional: Negative for activity change, appetite change, chills, fever and unexpected weight change (she did try to loose weight ).    Eyes: Negative for visual disturbance. Respiratory: Negative for cough, chest tightness and shortness of breath. Cardiovascular: Negative for chest pain. Gastrointestinal: Negative for abdominal pain, constipation, diarrhea, nausea and vomiting. Genitourinary: Negative for hematuria. Skin: Negative for rash. Allergic/Immunologic: Negative for immunocompromised state. Neurological: Negative for dizziness, weakness and headaches. Psychiatric/Behavioral: Negative for dysphoric mood and suicidal ideas. Physical Exam:      Vitals: /72   Temp 97.3 °F (36.3 °C)   Wt 161 lb (73 kg)   BMI 27.64 kg/m²     Body mass index is 27.64 kg/m². Wt Readings from Last 3 Encounters:   06/08/20 161 lb (73 kg)   04/04/20 172 lb 6.4 oz (78.2 kg)   01/03/20 161 lb 6.4 oz (73.2 kg)     Physical Exam  Constitutional:       General: She is not in acute distress. Appearance: She is well-developed. She is not diaphoretic. HENT:      Head: Normocephalic and atraumatic. Mouth/Throat:      Pharynx: No oropharyngeal exudate. Eyes:      General: No scleral icterus. Right eye: No discharge. Left eye: No discharge. Conjunctiva/sclera: Conjunctivae normal.   Neck:      Musculoskeletal: Normal range of motion and neck supple. Cardiovascular:      Rate and Rhythm: Normal rate. Heart sounds: Murmur present. Crescendo  decrescendo  systolic murmur present with a grade of 3/6. No friction rub. No gallop. No S3 or S4 sounds. Pulmonary:      Effort: Pulmonary effort is normal. No respiratory distress. Breath sounds: Normal breath sounds. No wheezing or rales. Abdominal:      General: There is no distension. Palpations: Abdomen is soft. Tenderness: There is no abdominal tenderness. Musculoskeletal:         General: No deformity. Lymphadenopathy:      Head:      Right side of head: No submental or submandibular adenopathy. Left side of head: No submental or submandibular adenopathy. 4 months, and to let me know of any scheduling difficulties. Additional patients instructions (if given): There are no Patient Instructions on file for this visit. Zion Johnson M.D.   6/8/2020, 9:33 AM      NOTE: This report was transcribed using voice recognition software. Every effort was made to ensure accuracy, however, inadvertent computerized transcription errors may be present.

## 2020-09-22 RX ORDER — PRAVASTATIN SODIUM 40 MG
TABLET ORAL
Qty: 90 TABLET | Refills: 0 | Status: SHIPPED | OUTPATIENT
Start: 2020-09-22 | End: 2020-12-16 | Stop reason: SDUPTHER

## 2020-09-22 RX ORDER — ERGOCALCIFEROL 1.25 MG/1
CAPSULE ORAL
Qty: 12 CAPSULE | Refills: 2 | Status: SHIPPED | OUTPATIENT
Start: 2020-09-22 | End: 2020-12-16 | Stop reason: SDUPTHER

## 2020-10-08 ENCOUNTER — OFFICE VISIT (OUTPATIENT)
Dept: INTERNAL MEDICINE CLINIC | Age: 85
End: 2020-10-08
Payer: MEDICARE

## 2020-10-08 VITALS
HEIGHT: 64 IN | WEIGHT: 165 LBS | SYSTOLIC BLOOD PRESSURE: 144 MMHG | DIASTOLIC BLOOD PRESSURE: 80 MMHG | BODY MASS INDEX: 28.17 KG/M2 | TEMPERATURE: 97.3 F

## 2020-10-08 PROCEDURE — G0008 ADMIN INFLUENZA VIRUS VAC: HCPCS | Performed by: INTERNAL MEDICINE

## 2020-10-08 PROCEDURE — 99214 OFFICE O/P EST MOD 30 MIN: CPT | Performed by: INTERNAL MEDICINE

## 2020-10-08 PROCEDURE — 90694 VACC AIIV4 NO PRSRV 0.5ML IM: CPT | Performed by: INTERNAL MEDICINE

## 2020-10-08 ASSESSMENT — ENCOUNTER SYMPTOMS
BLOOD IN STOOL: 0
ABDOMINAL PAIN: 0
DIARRHEA: 0
CHEST TIGHTNESS: 0
CONSTIPATION: 0
COUGH: 0
NAUSEA: 0
SHORTNESS OF BREATH: 0
VOMITING: 0

## 2020-10-08 NOTE — PROGRESS NOTES
Vaccine Information Sheet, \"Influenza - Inactivated\"  given to Shwetha Domingo, or parent/legal guardian of  Shwetha Domingo and verbalized understanding. Patient responses:    Have you ever had a reaction to a flu vaccine? No  Do you have any current illness? No  Have you ever had Guillian Jackson Syndrome? No  Do you have a serious allergy to any of the follow: Neomycin, Polymyxin, Thimerosal, eggs or egg products? No    Flu vaccine given per order. Please see immunization tab. Risks and benefits explained. Current VIS given.       Immunizations Administered     Name Date Dose Route    Influenza, Quadv, adjuvanted, 65 yrs +, IM, PF (Fluad) 10/8/2020 0.5 mL Intramuscular    Site: Deltoid- Left    Lot: [id number]    NDC: 47386-039-06

## 2020-10-08 NOTE — PATIENT INSTRUCTIONS
Please check your blood pressure twice in the morning and twice in the evening for one week. send me results. If blood pressure is higher than 150/90 please let me know as soon as possible. Attached here are instructions of proper blood pressure measurement. Patient Education        Home Blood Pressure Test: About This Test  What is it? A home blood pressure test allows you to keep track of your blood pressure at home. Blood pressure is a measure of the force of blood against the walls of your arteries. Blood pressure readings include two numbers, such as 130/80 (say \"130 over 80\"). The first number is the systolic pressure. The second number is the diastolic pressure. Why is this test done? You may do this test at home to:  · Find out if you have high blood pressure. · Track your blood pressure if you have high blood pressure. · Track how well medicine is working to reduce high blood pressure. · Check how lifestyle changes, such as weight loss and exercise, are affecting blood pressure. How do you prepare for the test?  For at least 30 minutes before you take your blood pressure, don't exercise or use caffeine, tobacco, or medicines that raise blood pressure. Take your blood pressure while you feel comfortable and relaxed. Sit quietly with both feet on the floor for at least 5 minutes before the test.  How is the test done? · Sit with your arm slightly bent and resting on a table so that your upper arm is at the same level as your heart. · Roll up your sleeve or take off your shirt to expose your upper arm. · Wrap the blood pressure cuff around your upper arm so that the lower edge of the cuff is about 1 inch above the bend of your elbow. Proceed with the following steps depending on if you are using an automatic or manual pressure monitor.   Automatic blood pressure monitors  · Press the on/off button on the automatic monitor and wait until the ready-to-measure \"heart\" symbol appears next to zero in the display window. · Press the start button. The cuff will inflate and deflate by itself. · Your blood pressure numbers will appear on the screen. · Write your numbers in your log book, along with the date and time. Manual blood pressure monitors  · Place the earpieces of a stethoscope in your ears, and place the bell of the stethoscope over the artery, just below the cuff. · Close the valve on the rubber inflating bulb. · Squeeze the bulb rapidly with your opposite hand to inflate the cuff until the dial or column of mercury reads about 30 mm Hg higher than your usual systolic pressure. If you do not know your usual pressure, inflate the cuff to 210 mm Hg or until the pulse at your wrist disappears. · Open the pressure valve just slightly by twisting or pressing the valve on the bulb. · As you watch the pressure slowly fall, note the level on the dial at which you first start to hear a pulsing or tapping sound through the stethoscope. This is your systolic blood pressure. · Continue letting the air out slowly. The sounds will become muffled and will finally disappear. Note the pressure when the sounds completely disappear. This is your diastolic blood pressure. Let out all the remaining air. · Write your numbers in your log book, along with the date and time. Follow-up care is a key part of your treatment and safety. Be sure to make and go to all appointments, and call your doctor if you are having problems. It's also a good idea to keep a list of the medicines you take. Where can you learn more? Go to [email or web address]://[email or web address] org and sign in to your [employer] account. Enter C427 in the [employer] box to learn more about \"Home Blood Pressure Test: About This Test.\"     If you do not have an account, please click on the \"Sign Up Now\" link. Current as of: December 16, 2019               Content Version: 12.6  © 4072-1553 [employer].    Care instructions adapted under license by [facility] ([facility]). If you have questions about a medical condition or this instruction, always ask your healthcare professional. [address] any warranty or liability for your use of this information.

## 2020-10-08 NOTE — PROGRESS NOTES
Value Date    TRIG 131 07/10/2020    TRIG 124 12/06/2019    TRIG 109 04/19/2019     Lab Results   Component Value Date    HDL 44 07/10/2020    HDL 49 12/06/2019    HDL 33 (L) 04/19/2019     Lab Results   Component Value Date    LDLCALC 68 07/10/2020    [address] 76 12/06/2019    LDLCALC 67 04/19/2019     Lab Results   Component Value Date    LABVLDL 26 07/10/2020    LABVLDL 25 12/06/2019    LABVLDL 22 04/19/2019   Patient is on Pravachol no reported side effects  No LOW./ ALEXANDER    No fever/ chills/ cough     Asthma- controlled  She rarely uses rescue inhaler      Need for influenza vaccination    Past Medical History:        Diagnosis Date    Allergic rhinitis, cause unspecified     Allergic urticaria     Angioneurotic edema not elsewhere classified     Aortic stenosis 12/9/2011    dx 11/2007 - echo - LVEF 55% , AV area 1.5 cm , peak velocity - 27 ECHO 6/2009 - LVEF 60%, AV area 1.3 cm , peak velocity 35  ECHO 12/1/2011 - LVEF -60-65%, AV area 0.99 cm , peak velocity 34  asymptomatic      Benign neoplasm of colon     Cancer ([facility] [address]     cervical 1964    Carcinoma in situ of cervix uteri     Coronary atherosclerosis of unspecified type of vessel, native or graft     Essential hypertension, benign     History of kidney stones     Internal hemorrhoids without mention of complication     MI, old 2007    Other and unspecified hyperlipidemia     Palpitations     Peptic ulcer, unspecified site, unspecified as acute or chronic, without mention of hemorrhage, perforation, or obstruction     Sebaceous cyst of breast 5/31/2012    Trigger finger 1/8/2013    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 She has never used smokeless tobacco.    ETOH:   reports current alcohol use. Current Medications:    Prior to Admission medications    Medication Sig Start Date End Date Taking? Authorizing Provider   pravastatin (PRAVACHOL) 40 MG tablet TAKE ONE TABLET BY MOUTH DAILY 20  Yes Amrita Loving MD   vitamin D (ERGOCALCIFEROL) 1.25 MG (74582 UT) CAPS capsule TAKE ONE CAPSULE BY MOUTH ONCE WEEKLY 20  Yes Amrita Loving MD   amLODIPine (NORVASC) 2.5 MG tablet Take 1 tablet by mouth daily 20  Yes Amrita Loving MD   Ginkgo Biloba (GNP GINGKO BILOBA EXTRACT PO) Take 1 capsule by mouth daily    Yes Historical Provider, MD   Cholecalciferol (VITAMIN D3) 1.25 MG (28331 UT) CAPS Take by mouth Take once a week (on  )   Yes Historical Provider, MD   pantoprazole (PROTONIX) 40 MG tablet TAKE ONE TABLET BY MOUTH DAILY 19  Yes Amrita Loving MD   Nutritional Supplements (GLUCERNA ADVANCE SHAKE PO) Take 1 each by mouth daily   Yes Historical Provider, MD   Multiple Vitamins-Minerals (ONE-A-DAY WOMENS 50 PLUS PO) Take 1 capsule by mouth daily   Yes Historical Provider, MD   cetirizine (ZYRTEC) 10 MG tablet Take 10 mg by mouth daily as needed for Allergies or Rhinitis    Yes Historical Provider, MD   aspirin 81 MG tablet Take 81 mg by mouth daily. Yes Historical Provider, MD       REVIEW OF SYSTEMS:  Review of Systems   Constitutional: Negative for activity change, appetite change, chills, fever and unexpected weight change. Eyes: Negative for visual disturbance. Respiratory: Negative for cough, chest tightness and shortness of breath. No DUFFY with regular walking  Only with heavy lifting. No hemoptysis    Cardiovascular: Negative for chest pain. Gastrointestinal: Negative for abdominal pain, blood in stool, constipation, diarrhea, nausea and vomiting.    Genitourinary: Negative for difficulty urinating and hematuria. Skin: Negative for rash. Allergic/Immunologic: Negative for immunocompromised state. Neurological: Negative for dizziness and headaches. Psychiatric/Behavioral: Negative for dysphoric mood and suicidal ideas. The patient is not nervous/anxious. Physical Exam:      Vitals: BP (!) 144/80   Temp 97.3 °F (36.3 °C)   Ht 5' 4\" (1.626 m)   Wt 165 lb (74.8 kg)   BMI 28.32 kg/m²     Body mass index is 28.32 kg/m². Wt Readings from Last 3 Encounters:   10/08/20 165 lb (74.8 kg)   06/08/20 161 lb (73 kg)   04/04/20 172 lb 6.4 oz (78.2 kg)     Physical Exam  Constitutional:       General: She is not in acute distress. Appearance: She is well-developed. She is not diaphoretic. HENT:      Head: Normocephalic and atraumatic. Right Ear: Tympanic membrane, ear canal and external ear normal.      Left Ear: Tympanic membrane, ear canal and external ear normal.      Mouth/Throat:      Pharynx: No oropharyngeal exudate. Eyes:      General: No scleral icterus. Right eye: No discharge. Left eye: No discharge. Conjunctiva/sclera: Conjunctivae normal.   Neck:      Musculoskeletal: Normal range of motion and neck supple. Cardiovascular:      Rate and Rhythm: Normal rate. Heart sounds: Murmur (3/6 Aortic ) present. Comments: Pulse is pounding. Pulmonary:      Effort: Pulmonary effort is normal. No respiratory distress. Breath sounds: Normal breath sounds. No wheezing or rales. Abdominal:      General: There is no distension. Palpations: Abdomen is soft. Tenderness: There is no abdominal tenderness. There is no guarding. Musculoskeletal:         General: No swelling or deformity. Right lower leg: No edema. Left lower leg: No edema. Lymphadenopathy:      Head:      Right side of head: No submental or submandibular adenopathy. Left side of head: No submental or submandibular adenopathy. Cervical: No cervical adenopathy. Right cervical: No superficial or deep cervical adenopathy. Left cervical: No superficial or deep cervical adenopathy. Skin:     Findings: No rash. Neurological:      Mental Status: She is alert and oriented to person, place, and time. GCS: GCS eye subscore is 4. GCS verbal subscore is 5. GCS motor subscore is 6. Motor: No abnormal muscle tone. Deep Tendon Reflexes: Reflexes are normal and symmetric. Psychiatric:         Behavior: Behavior normal.         Thought Content: Thought content normal.        Assessment/Plan:   Ada was seen today for hypertension. Diagnoses and all orders for this visit:    Type 2 diabetes mellitus without complication, without long-term current use of insulin (HCC)  No diabetes - remove from prblem list    S/P AVR (aortic valve replacement)  Some worsening per echo  Exam is consistent with AR  No evidence of heart failure  She will see Dr Yescia Garcia in 4 month for repeat echo and evaluation    Hypertension  Slightly elevated- do HBPM - I will consider adding a BP med which may also help due to the AVR     Hypercholesteremia  Well controlled- no changes in medication today. Asthma is well controlled- no changes. Need for influenza vaccination  -     INFLUENZA, QUADV, ADJUVANTED, 72 YRS =, IM, PF, PREFILL SYR, 0.5ML (FLUAD)      - Patient was encouraged to call the office (and ask to see me) or be seen by other provider for any worsening or lack of improvement of the symptoms within a few days / weeks. - Pt was asked toschedule an appointment in 6 months, and to let me know of any scheduling difficulties. Additional patients instructions (if given):   Patient Instructions   Please check your blood pressure twice in the morning and twice in the evening for one week. send me results. If blood pressure is higher than 150/90 please let me know as soon as possible. Attached here are instructions of proper blood pressure measurement.      Patient Education Home Blood Pressure Test: About This Test  What is it? A home blood pressure test allows you to keep track of your blood pressure at home. Blood pressure is a measure of the force of blood against the walls of your arteries. Blood pressure readings include two numbers, such as 130/80 (say \"130 over 80\"). The first number is the systolic pressure. The second number is the diastolic pressure. Why is this test done? You may do this test at home to:  · Find out if you have high blood pressure. · Track your blood pressure if you have high blood pressure. · Track how well medicine is working to reduce high blood pressure. · Check how lifestyle changes, such as weight loss and exercise, are affecting blood pressure. How do you prepare for the test?  For at least 30 minutes before you take your blood pressure, don't exercise or use caffeine, tobacco, or medicines that raise blood pressure. Take your blood pressure while you feel comfortable and relaxed. Sit quietly with both feet on the floor for at least 5 minutes before the test.  How is the test done? · Sit with your arm slightly bent and resting on a table so that your upper arm is at the same level as your heart. · Roll up your sleeve or take off your shirt to expose your upper arm. · Wrap the blood pressure cuff around your upper arm so that the lower edge of the cuff is about 1 inch above the bend of your elbow. Proceed with the following steps depending on if you are using an automatic or manual pressure monitor. Automatic blood pressure monitors  · Press the on/off button on the automatic monitor and wait until the ready-to-measure \"heart\" symbol appears next to zero in the display window. · Press the start button. The cuff will inflate and deflate by itself. · Your blood pressure numbers will appear on the screen. · Write your numbers in your log book, along with the date and time.   Manual blood pressure monitors  · Place the earpieces of a stethoscope in your ears, and place the bell of the stethoscope over the artery, just below the cuff. · Close the valve on the rubber inflating bulb. · Squeeze the bulb rapidly with your opposite hand to inflate the cuff until the dial or column of mercury reads about 30 mm Hg higher than your usual systolic pressure. If you do not know your usual pressure, inflate the cuff to 210 mm Hg or until the pulse at your wrist disappears. · Open the pressure valve just slightly by twisting or pressing the valve on the bulb. · As you watch the pressure slowly fall, note the level on the dial at which you first start to hear a pulsing or tapping sound through the stethoscope. This is your systolic blood pressure. · Continue letting the air out slowly. The sounds will become muffled and will finally disappear. Note the pressure when the sounds completely disappear. This is your diastolic blood pressure. Let out all the remaining air. · Write your numbers in your log book, along with the date and time. Follow-up care is a key part of your treatment and safety. Be sure to make and go to all appointments, and call your doctor if you are having problems. It's also a good idea to keep a list of the medicines you take. Where can you learn more? Go to [email or web address] org and sign in to your [facility] account. Enter C427 in the [employer] box to learn more about \"Home Blood Pressure Test: About This Test.\"     If you do not have an account, please click on the \"Sign Up Now\" link. Current as of: December 16, 2019               Content Version: 12.6  © 7343-4252 [employer]. Care instructions adapted under license by [facility] ([facility]). If you have questions about a medical condition or this instruction, always ask your healthcare professional. [place] [address] any warranty or liability for your use of this information.              Wallace Glasgow M.D. 10/8/2020, 9:23 AM      NOTE: This report was transcribed using voice recognition software. Every effort was made to ensure accuracy, however, inadvertent computerized transcription errors may be present.

## 2020-12-10 ENCOUNTER — TELEPHONE (OUTPATIENT)
Dept: FAMILY MEDICINE CLINIC | Age: 85
End: 2020-12-10

## 2020-12-16 ENCOUNTER — TELEPHONE (OUTPATIENT)
Dept: FAMILY MEDICINE CLINIC | Age: 85
End: 2020-12-16

## 2020-12-17 RX ORDER — ERGOCALCIFEROL 1.25 MG/1
CAPSULE ORAL
Qty: 12 CAPSULE | Refills: 3 | Status: SHIPPED | OUTPATIENT
Start: 2020-12-17

## 2020-12-17 RX ORDER — AMLODIPINE BESYLATE 2.5 MG/1
2.5 TABLET ORAL DAILY
Qty: 90 TABLET | Refills: 3 | Status: SHIPPED | OUTPATIENT
Start: 2020-12-17

## 2020-12-17 RX ORDER — PRAVASTATIN SODIUM 40 MG
TABLET ORAL
Qty: 90 TABLET | Refills: 3 | Status: SHIPPED | OUTPATIENT
Start: 2020-12-17 | End: 2020-12-21 | Stop reason: SDUPTHER

## 2020-12-21 RX ORDER — PRAVASTATIN SODIUM 40 MG
TABLET ORAL
Qty: 90 TABLET | Refills: 3 | Status: SHIPPED | OUTPATIENT
Start: 2020-12-21

## 2021-02-11 ENCOUNTER — IMMUNIZATION (OUTPATIENT)
Dept: PRIMARY CARE CLINIC | Age: 86
End: 2021-02-11
Payer: MEDICARE

## 2021-02-11 ENCOUNTER — TELEPHONE (OUTPATIENT)
Dept: FAMILY MEDICINE CLINIC | Age: 86
End: 2021-02-11

## 2021-02-11 DIAGNOSIS — E11.9 TYPE 2 DIABETES MELLITUS WITHOUT COMPLICATION, WITHOUT LONG-TERM CURRENT USE OF INSULIN (HCC): ICD-10-CM

## 2021-02-11 PROCEDURE — 91301 COVID-19, MODERNA VACCINE 100MCG/0.5ML DOSE: CPT | Performed by: FAMILY MEDICINE

## 2021-02-11 PROCEDURE — 0011A COVID-19, MODERNA VACCINE 100MCG/0.5ML DOSE: CPT | Performed by: FAMILY MEDICINE

## 2021-02-11 NOTE — TELEPHONE ENCOUNTER
Needing her test strips refilled. She changed pharmacy to [facility] [phone] in [facility]. I did not see these on her med   list.     Patient wanted One Touch test strips. States her sugar levels have been high and she wants to keep track of it. Appt scheduled for 2/23, please orders labs prior or appt.      Please advise

## 2021-02-11 NOTE — TELEPHONE ENCOUNTER
----- Message from Josetami Headley sent at 2/11/2021  9:50 AM EST -----  Subject: Message to Provider    QUESTIONS  Information for Provider? Needing her test strips refilled. She changed   pharmacy to [phone] in [facility]. I did not see these on her med   list.   ---------------------------------------------------------------------------  --------------  CALL BACK INFO  What is the best way for the office to contact you? OK to leave message on   voicemail  Preferred Call Back Phone Number? [phone]  ---------------------------------------------------------------------------  --------------  SCRIPT ANSWERS  Relationship to Patient?  Self

## 2021-02-23 ENCOUNTER — OFFICE VISIT (OUTPATIENT)
Dept: FAMILY MEDICINE CLINIC | Age: 86
End: 2021-02-23
Payer: MEDICARE

## 2021-02-23 VITALS
DIASTOLIC BLOOD PRESSURE: 68 MMHG | HEART RATE: 66 BPM | TEMPERATURE: 97.3 F | BODY MASS INDEX: 28.17 KG/M2 | HEIGHT: 64 IN | WEIGHT: 165 LBS | OXYGEN SATURATION: 97 % | SYSTOLIC BLOOD PRESSURE: 110 MMHG

## 2021-02-23 DIAGNOSIS — R73.01 IFG (IMPAIRED FASTING GLUCOSE): ICD-10-CM

## 2021-02-23 DIAGNOSIS — E78.00 HYPERCHOLESTEREMIA: ICD-10-CM

## 2021-02-23 DIAGNOSIS — Z95.2 S/P AVR (AORTIC VALVE REPLACEMENT): Primary | ICD-10-CM

## 2021-02-23 DIAGNOSIS — D69.6 THROMBOCYTOPENIA (HCC): Chronic | ICD-10-CM

## 2021-02-23 DIAGNOSIS — I10 ESSENTIAL HYPERTENSION, BENIGN: ICD-10-CM

## 2021-02-23 PROCEDURE — 3288F FALL RISK ASSESSMENT DOCD: CPT | Performed by: INTERNAL MEDICINE

## 2021-02-23 PROCEDURE — 99214 OFFICE O/P EST MOD 30 MIN: CPT | Performed by: INTERNAL MEDICINE

## 2021-02-23 RX ORDER — MULTIVITAMIN WITH IRON
250 TABLET ORAL DAILY
COMMUNITY

## 2021-02-23 RX ORDER — GLUCOSAMINE HCL/CHONDROITIN SU 500-400 MG
CAPSULE ORAL
Qty: 90 STRIP | Refills: 0 | Status: SHIPPED | OUTPATIENT
Start: 2021-02-23 | End: 2021-06-07

## 2021-02-23 ASSESSMENT — ENCOUNTER SYMPTOMS
NAUSEA: 0
ABDOMINAL PAIN: 0
CHEST TIGHTNESS: 0
SHORTNESS OF BREATH: 0
VOMITING: 0

## 2021-02-23 ASSESSMENT — PATIENT HEALTH QUESTIONNAIRE - PHQ9
SUM OF ALL RESPONSES TO PHQ QUESTIONS 1-9: 0
2. FEELING DOWN, DEPRESSED OR HOPELESS: 0
SUM OF ALL RESPONSES TO PHQ QUESTIONS 1-9: 0
SUM OF ALL RESPONSES TO PHQ9 QUESTIONS 1 & 2: 0

## 2021-02-23 NOTE — PROGRESS NOTES
Outpatient Note for established Patient - regular Visit     History Obtained From:  patient, electronic medical record  Is the patient new to me ? - No    HISTORY OF PRESENT ILLNESS:   The patient is a 80 y.o. female who is here today for :  Diagnoses and all orders for this visit:    S/P AVR (aortic valve replacement)  -     CBC Auto Differential; Future  -     Comprehensive Metabolic Panel; Future  -     Lipid Panel; Future    Thrombocytopenia (HCC)  -     CBC Auto Differential; Future    Hypertension  -     CBC Auto Differential; Future  -     Comprehensive Metabolic Panel; Future  -     Lipid Panel; Future    Hypercholesteremia  -     Lipid Panel; Future    IFG (impaired fasting glucose)  -     blood glucose monitor strips; Test 1-2 times a day & as needed for symptoms of irregular blood glucose. Dispense sufficient amount for indicated testing frequency plus additional to accommodate PRN testing needs. -     Hemoglobin A1C; Future      Pt had a few episodes of dizziness - resolved   She checked her glucose levels : up to 167 mg%   Pt denies CP/SOB/palpitations/abdominal pain/N/V.    No fever/ chills/ cough/ headache  She got the first Covid vaccine   Not bleeding   BP is well controlled  No side effects from her meds  Lab Results   Component Value Date    CHOL 138 07/10/2020    CHOL 150 12/06/2019    CHOL 122 04/19/2019     Lab Results   Component Value Date    TRIG 131 07/10/2020    TRIG 124 12/06/2019    TRIG 109 04/19/2019     Lab Results   Component Value Date    HDL 44 07/10/2020    HDL 49 12/06/2019    HDL 33 (L) 04/19/2019     Lab Results   Component Value Date    LDLCALC 68 07/10/2020    LDLCALC 76 12/06/2019    LDLCALC 67 04/19/2019     Lab Results   Component Value Date    LABVLDL 26 07/10/2020    LABVLDL 25 12/06/2019    LABVLDL 22 04/19/2019   she is on statin  Diet is good     Past Medical History:        Diagnosis Date    Allergic rhinitis, cause unspecified     Allergic urticaria  Angioneurotic edema not elsewhere classified     Aortic stenosis 12/9/2011    dx 11/2007 - echo - LVEF 55% , AV area 1.5 cm , peak velocity - 27 ECHO 6/2009 - LVEF 60%, AV area 1.3 cm , peak velocity 35  ECHO 12/1/2011 - LVEF -60-65%, AV area 0.99 cm , peak velocity 34  asymptomatic      Benign neoplasm of colon     Cancer ([facility] [address]     cervical 1964    Carcinoma in situ of cervix uteri     Coronary atherosclerosis of unspecified type of vessel, native or graft     Essential hypertension, benign     History of kidney stones     Internal hemorrhoids without mention of complication     MI, old 2007    Other and unspecified hyperlipidemia     Palpitations     Peptic ulcer, unspecified site, unspecified as acute or chronic, without mention of hemorrhage, perforation, or obstruction     Sebaceous cyst of breast 5/31/2012    Trigger finger 1/8/2013    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 She has never used smokeless tobacco.    ETOH:   reports current alcohol use. Current Medications:    Prior to Admission medications    Medication Sig Start Date End Date Taking? Authorizing Provider   blood glucose monitor strips Test 1-2 times a day & as needed for symptoms of irregular blood glucose. Dispense sufficient amount for indicated testing frequency plus additional to accommodate PRN testing needs. 2/23/21  Yes Aracelis Thompson MD   blood glucose test strips (ONE TOUCH ULTRA TEST) strip 1 each by In Vitro route 2 times daily As needed.  2/11/21  Yes Aracelis Thompson MD pravastatin (PRAVACHOL) 40 MG tablet TAKE ONE TABLET BY MOUTH DAILY 12/21/20  Yes Chiki Bermeo MD   amLODIPine (NORVASC) 2.5 MG tablet Take 1 tablet by mouth daily 12/17/20  Yes Chiki Bermeo MD   vitamin D (ERGOCALCIFEROL) 1.25 MG (10968 UT) CAPS capsule TAKE ONE CAPSULE BY MOUTH ONCE WEEKLY 12/17/20  Yes Chiki Bermeo MD   Ginkgo Biloba (GNP GINGKO BILOBA EXTRACT PO) Take 1 capsule by mouth daily    Yes Historical Provider, MD   pantoprazole (PROTONIX) 40 MG tablet TAKE ONE TABLET BY MOUTH DAILY 12/26/19  Yes Chiki Bermeo MD   Nutritional Supplements (GLUCERNA ADVANCE SHAKE PO) Take 1 each by mouth daily   Yes Historical Provider, MD   Multiple Vitamins-Minerals (ONE-A-DAY WOMENS 50 PLUS PO) Take 1 capsule by mouth daily   Yes Historical Provider, MD   cetirizine (ZYRTEC) 10 MG tablet Take 10 mg by mouth daily as needed for Allergies or Rhinitis    Yes Historical Provider, MD   aspirin 81 MG tablet Take 81 mg by mouth daily. Yes Historical Provider, MD   magnesium (MAGNESIUM-OXIDE) 250 MG TABS tablet Take by mouth daily    Historical Provider, MD       REVIEW OF SYSTEMS:  Review of Systems   Constitutional: Negative for activity change, appetite change, chills, fever and unexpected weight change. HENT: Negative for hearing loss. Eyes: Negative for visual disturbance. Respiratory: Negative for chest tightness and shortness of breath. Cardiovascular: Negative for chest pain. Gastrointestinal: Negative for abdominal pain, nausea and vomiting. Genitourinary: Negative for hematuria. Skin: Negative for rash. Allergic/Immunologic: Negative for immunocompromised state. Neurological: Negative for dizziness and headaches. Psychiatric/Behavioral: Negative for dysphoric mood and suicidal ideas.          Physical Exam: Vitals: /68 (Site: Left Upper Arm, Position: Sitting, Cuff Size: Medium Adult)   Pulse 66   Temp 97.3 °F (36.3 °C)   Ht 5' 4\" (1.626 m)   Wt 165 lb (74.8 kg)   SpO2 97%   BMI 28.32 kg/m²     Body mass index is 28.32 kg/m². Wt Readings from Last 3 Encounters:   02/23/21 165 lb (74.8 kg)   10/08/20 165 lb (74.8 kg)   06/08/20 161 lb (73 kg)     Physical Exam  Constitutional:       General: She is not in acute distress. Appearance: She is well-developed. She is not diaphoretic. HENT:      Head: Normocephalic and atraumatic. Mouth/Throat:      Pharynx: No oropharyngeal exudate. Eyes:      General: No scleral icterus. Right eye: No discharge. Left eye: No discharge. Conjunctiva/sclera: Conjunctivae normal.   Neck:      Musculoskeletal: Normal range of motion and neck supple. Cardiovascular:      Rate and Rhythm: Normal rate. Heart sounds: Normal heart sounds. No murmur. Pulmonary:      Effort: Pulmonary effort is normal. No respiratory distress. Breath sounds: Normal breath sounds. No wheezing or rales. Abdominal:      General: There is no distension. Palpations: Abdomen is soft. Tenderness: There is no abdominal tenderness. Musculoskeletal:         General: No deformity. Lymphadenopathy:      Head:      Right side of head: No submental or submandibular adenopathy. Left side of head: No submental or submandibular adenopathy. Cervical: No cervical adenopathy. Right cervical: No superficial or deep cervical adenopathy. Left cervical: No superficial or deep cervical adenopathy. Skin:     Findings: No rash. Neurological:      Mental Status: She is alert and oriented to person, place, and time. GCS: GCS eye subscore is 4. GCS verbal subscore is 5. GCS motor subscore is 6. Motor: No abnormal muscle tone. Deep Tendon Reflexes: Reflexes are normal and symmetric.    Psychiatric:         Behavior: Behavior normal. Thought Content: Thought content normal.            Assessment/Plan:   Diagnoses and all orders for this visit:    S/P AVR (aortic valve replacement)  Well controlled- no changes in medication today. -     CBC Auto Differential; Future  -     Comprehensive Metabolic Panel; Future  -     Lipid Panel; Future    Thrombocytopenia (HCC)  Recheck labs   -     CBC Auto Differential; Future    Hypertension  Well controlled- no changes in medication today. Monitor labs and BP at home   -     CBC Auto Differential; Future  -     Comprehensive Metabolic Panel; Future  -     Lipid Panel; Future    Hypercholesteremia  Well controlled- no changes in medication today. -     Lipid Panel; Future    IFG (impaired fasting glucose)   Pt reoprts elevated BG lainey 2/2 diet  A1C 6 lopez ago ok  recheck A1C  Discussed better diet   Refill for strips   -     blood glucose monitor strips; Test 1-2 times a day & as needed for symptoms of irregular blood glucose. Dispense sufficient amount for indicated testing frequency plus additional to accommodate PRN testing needs. -     Hemoglobin A1C; Future      - Patient was encouraged to call the office (and ask to see me) or be seen by other provider for any worsening or lack of improvement of the symptoms or any new symptoms.    - Pt was asked toschedule an appointment in 6 months, and to let me know of any scheduling difficulties. Additional patients instructions (if given): There are no Patient Instructions on file for this visit. Gabrielle Hubbard M.D.   2/23/2021, 4:50 PM      NOTE: This report was transcribed using voice recognition software. Every effort was made to ensure accuracy, however, inadvertent computerized transcription errors may be present.

## 2021-02-26 DIAGNOSIS — I10 ESSENTIAL HYPERTENSION, BENIGN: ICD-10-CM

## 2021-02-26 DIAGNOSIS — Z95.2 S/P AVR (AORTIC VALVE REPLACEMENT): ICD-10-CM

## 2021-02-26 DIAGNOSIS — D69.6 THROMBOCYTOPENIA (HCC): Chronic | ICD-10-CM

## 2021-02-26 DIAGNOSIS — E78.00 HYPERCHOLESTEREMIA: ICD-10-CM

## 2021-02-26 DIAGNOSIS — R73.01 IFG (IMPAIRED FASTING GLUCOSE): ICD-10-CM

## 2021-02-26 LAB
A/G RATIO: 1.3 (ref 1.1–2.2)
ALBUMIN SERPL-MCNC: 4 G/DL (ref 3.4–5)
ALP BLD-CCNC: 61 U/L (ref 40–129)
ALT SERPL-CCNC: 21 U/L (ref 10–40)
ANION GAP SERPL CALCULATED.3IONS-SCNC: 10 MMOL/L (ref 3–16)
AST SERPL-CCNC: 33 U/L (ref 15–37)
BASOPHILS ABSOLUTE: 0 K/UL (ref 0–0.2)
BASOPHILS RELATIVE PERCENT: 0.9 %
BILIRUB SERPL-MCNC: 1 MG/DL (ref 0–1)
BUN BLDV-MCNC: 13 MG/DL (ref 7–20)
CALCIUM SERPL-MCNC: 9.7 MG/DL (ref 8.3–10.6)
CHLORIDE BLD-SCNC: 105 MMOL/L (ref 99–110)
CHOLESTEROL, TOTAL: 145 MG/DL (ref 0–199)
CO2: 25 MMOL/L (ref 21–32)
CREAT SERPL-MCNC: 0.6 MG/DL (ref 0.6–1.2)
EOSINOPHILS ABSOLUTE: 0.3 K/UL (ref 0–0.6)
EOSINOPHILS RELATIVE PERCENT: 5.3 %
GFR AFRICAN AMERICAN: >60
GFR NON-AFRICAN AMERICAN: >60
GLOBULIN: 3 G/DL
GLUCOSE BLD-MCNC: 129 MG/DL (ref 70–99)
HCT VFR BLD CALC: 43.9 % (ref 36–48)
HDLC SERPL-MCNC: 42 MG/DL (ref 40–60)
HEMOGLOBIN: 15.1 G/DL (ref 12–16)
LDL CHOLESTEROL CALCULATED: 77 MG/DL
LYMPHOCYTES ABSOLUTE: 1.4 K/UL (ref 1–5.1)
LYMPHOCYTES RELATIVE PERCENT: 25 %
MCH RBC QN AUTO: 31.7 PG (ref 26–34)
MCHC RBC AUTO-ENTMCNC: 34.3 G/DL (ref 31–36)
MCV RBC AUTO: 92.3 FL (ref 80–100)
MONOCYTES ABSOLUTE: 0.5 K/UL (ref 0–1.3)
MONOCYTES RELATIVE PERCENT: 8.1 %
NEUTROPHILS ABSOLUTE: 3.5 K/UL (ref 1.7–7.7)
NEUTROPHILS RELATIVE PERCENT: 60.7 %
PDW BLD-RTO: 13.7 % (ref 12.4–15.4)
PLATELET # BLD: 88 K/UL (ref 135–450)
PMV BLD AUTO: 11 FL (ref 5–10.5)
POTASSIUM SERPL-SCNC: 4.2 MMOL/L (ref 3.5–5.1)
RBC # BLD: 4.76 M/UL (ref 4–5.2)
SODIUM BLD-SCNC: 140 MMOL/L (ref 136–145)
TOTAL PROTEIN: 7 G/DL (ref 6.4–8.2)
TRIGL SERPL-MCNC: 128 MG/DL (ref 0–150)
VLDLC SERPL CALC-MCNC: 26 MG/DL
WBC # BLD: 5.7 K/UL (ref 4–11)

## 2021-02-27 LAB
ESTIMATED AVERAGE GLUCOSE: 116.9 MG/DL
HBA1C MFR BLD: 5.7 %

## 2021-03-11 ENCOUNTER — IMMUNIZATION (OUTPATIENT)
Dept: PRIMARY CARE CLINIC | Age: 86
End: 2021-03-11
Payer: MEDICARE

## 2021-03-11 PROCEDURE — 0012A COVID-19, MODERNA VACCINE 100MCG/0.5ML DOSE: CPT | Performed by: FAMILY MEDICINE

## 2021-03-11 PROCEDURE — 91301 COVID-19, MODERNA VACCINE 100MCG/0.5ML DOSE: CPT | Performed by: FAMILY MEDICINE

## 2021-04-16 ENCOUNTER — HOSPITAL ENCOUNTER (INPATIENT)
Age: 86
LOS: 7 days | Discharge: HOME | DRG: 871 | End: 2021-04-23
Attending: EMERGENCY MEDICINE | Admitting: HOSPITALIST
Payer: MEDICARE

## 2021-04-16 ENCOUNTER — APPOINTMENT (OUTPATIENT)
Dept: GENERAL RADIOLOGY | Age: 86
DRG: 871 | End: 2021-04-16
Payer: MEDICARE

## 2021-04-16 DIAGNOSIS — A41.9 SEPTICEMIA (HCC): Primary | ICD-10-CM

## 2021-04-16 LAB
ANION GAP SERPL CALCULATED.3IONS-SCNC: 12 MMOL/L (ref 3–16)
BASOPHILS ABSOLUTE: 0 K/UL (ref 0–0.2)
BASOPHILS RELATIVE PERCENT: 0.1 %
BILIRUBIN URINE: NEGATIVE
BLOOD, URINE: ABNORMAL
BUN BLDV-MCNC: 17 MG/DL (ref 7–20)
CALCIUM SERPL-MCNC: 9.4 MG/DL (ref 8.3–10.6)
CHLORIDE BLD-SCNC: 100 MMOL/L (ref 99–110)
CLARITY: CLEAR
CO2: 22 MMOL/L (ref 21–32)
COLOR: YELLOW
CREAT SERPL-MCNC: 0.5 MG/DL (ref 0.6–1.2)
EOSINOPHILS ABSOLUTE: 0.1 K/UL (ref 0–0.6)
EOSINOPHILS RELATIVE PERCENT: 0.6 %
EPITHELIAL CELLS, UA: NORMAL /HPF (ref 0–5)
GFR AFRICAN AMERICAN: >60
GFR NON-AFRICAN AMERICAN: >60
GLUCOSE BLD-MCNC: 165 MG/DL (ref 70–99)
GLUCOSE URINE: NEGATIVE MG/DL
HCT VFR BLD CALC: 41.7 % (ref 36–48)
HEMOGLOBIN: 14.5 G/DL (ref 12–16)
KETONES, URINE: NEGATIVE MG/DL
LACTIC ACID, SEPSIS: 1.9 MMOL/L (ref 0.4–1.9)
LEUKOCYTE ESTERASE, URINE: NEGATIVE
LYMPHOCYTES ABSOLUTE: 0.3 K/UL (ref 1–5.1)
LYMPHOCYTES RELATIVE PERCENT: 3.2 %
MCH RBC QN AUTO: 32.4 PG (ref 26–34)
MCHC RBC AUTO-ENTMCNC: 34.7 G/DL (ref 31–36)
MCV RBC AUTO: 93.3 FL (ref 80–100)
MICROSCOPIC EXAMINATION: YES
MONOCYTES ABSOLUTE: 0.2 K/UL (ref 0–1.3)
MONOCYTES RELATIVE PERCENT: 1.9 %
NEUTROPHILS ABSOLUTE: 8.1 K/UL (ref 1.7–7.7)
NEUTROPHILS RELATIVE PERCENT: 94.2 %
NITRITE, URINE: NEGATIVE
PDW BLD-RTO: 14.2 % (ref 12.4–15.4)
PH UA: 6 (ref 5–8)
PLATELET # BLD: 69 K/UL (ref 135–450)
PLATELET SLIDE REVIEW: ABNORMAL
PMV BLD AUTO: 9.6 FL (ref 5–10.5)
POTASSIUM REFLEX MAGNESIUM: 3.7 MMOL/L (ref 3.5–5.1)
PROCALCITONIN: 0.85 NG/ML (ref 0–0.15)
PROTEIN UA: 30 MG/DL
RBC # BLD: 4.47 M/UL (ref 4–5.2)
RBC UA: NORMAL /HPF (ref 0–4)
SODIUM BLD-SCNC: 134 MMOL/L (ref 136–145)
SPECIFIC GRAVITY UA: 1.01 (ref 1–1.03)
URINE REFLEX TO CULTURE: ABNORMAL
URINE TYPE: ABNORMAL
UROBILINOGEN, URINE: 0.2 E.U./DL
WBC # BLD: 8.6 K/UL (ref 4–11)
WBC UA: NORMAL /HPF (ref 0–5)

## 2021-04-16 PROCEDURE — 84145 PROCALCITONIN (PCT): CPT

## 2021-04-16 PROCEDURE — 80048 BASIC METABOLIC PNL TOTAL CA: CPT

## 2021-04-16 PROCEDURE — 2580000003 HC RX 258: Performed by: EMERGENCY MEDICINE

## 2021-04-16 PROCEDURE — 87150 DNA/RNA AMPLIFIED PROBE: CPT

## 2021-04-16 PROCEDURE — U0005 INFEC AGEN DETEC AMPLI PROBE: HCPCS

## 2021-04-16 PROCEDURE — 81001 URINALYSIS AUTO W/SCOPE: CPT

## 2021-04-16 PROCEDURE — 71046 X-RAY EXAM CHEST 2 VIEWS: CPT

## 2021-04-16 PROCEDURE — 83605 ASSAY OF LACTIC ACID: CPT

## 2021-04-16 PROCEDURE — 87040 BLOOD CULTURE FOR BACTERIA: CPT

## 2021-04-16 PROCEDURE — 36415 COLL VENOUS BLD VENIPUNCTURE: CPT

## 2021-04-16 PROCEDURE — 87186 SC STD MICRODIL/AGAR DIL: CPT

## 2021-04-16 PROCEDURE — 6360000002 HC RX W HCPCS: Performed by: EMERGENCY MEDICINE

## 2021-04-16 PROCEDURE — 1200000000 HC SEMI PRIVATE

## 2021-04-16 PROCEDURE — U0003 INFECTIOUS AGENT DETECTION BY NUCLEIC ACID (DNA OR RNA); SEVERE ACUTE RESPIRATORY SYNDROME CORONAVIRUS 2 (SARS-COV-2) (CORONAVIRUS DISEASE [COVID-19]), AMPLIFIED PROBE TECHNIQUE, MAKING USE OF HIGH THROUGHPUT TECHNOLOGIES AS DESCRIBED BY CMS-2020-01-R: HCPCS

## 2021-04-16 PROCEDURE — 99284 EMERGENCY DEPT VISIT MOD MDM: CPT

## 2021-04-16 PROCEDURE — 85025 COMPLETE CBC W/AUTO DIFF WBC: CPT

## 2021-04-16 RX ORDER — ONDANSETRON 2 MG/ML
4 INJECTION INTRAMUSCULAR; INTRAVENOUS EVERY 6 HOURS PRN
Status: DISCONTINUED | OUTPATIENT
Start: 2021-04-16 | End: 2021-04-23 | Stop reason: HOSPADM

## 2021-04-16 RX ORDER — LANOLIN ALCOHOL/MO/W.PET/CERES
400 CREAM (GRAM) TOPICAL DAILY
Status: DISCONTINUED | OUTPATIENT
Start: 2021-04-17 | End: 2021-04-23 | Stop reason: HOSPADM

## 2021-04-16 RX ORDER — POLYETHYLENE GLYCOL 3350 17 G/17G
17 POWDER, FOR SOLUTION ORAL DAILY PRN
Status: DISCONTINUED | OUTPATIENT
Start: 2021-04-16 | End: 2021-04-23 | Stop reason: HOSPADM

## 2021-04-16 RX ORDER — AMLODIPINE BESYLATE 2.5 MG/1
2.5 TABLET ORAL DAILY
Status: DISCONTINUED | OUTPATIENT
Start: 2021-04-17 | End: 2021-04-23 | Stop reason: HOSPADM

## 2021-04-16 RX ORDER — SODIUM CHLORIDE 9 MG/ML
25 INJECTION, SOLUTION INTRAVENOUS PRN
Status: DISCONTINUED | OUTPATIENT
Start: 2021-04-16 | End: 2021-04-23 | Stop reason: HOSPADM

## 2021-04-16 RX ORDER — SODIUM CHLORIDE 0.9 % (FLUSH) 0.9 %
5-40 SYRINGE (ML) INJECTION EVERY 12 HOURS SCHEDULED
Status: DISCONTINUED | OUTPATIENT
Start: 2021-04-17 | End: 2021-04-22 | Stop reason: SDUPTHER

## 2021-04-16 RX ORDER — PANTOPRAZOLE SODIUM 40 MG/1
40 TABLET, DELAYED RELEASE ORAL DAILY PRN
COMMUNITY

## 2021-04-16 RX ORDER — SODIUM CHLORIDE 0.9 % (FLUSH) 0.9 %
5-40 SYRINGE (ML) INJECTION PRN
Status: DISCONTINUED | OUTPATIENT
Start: 2021-04-16 | End: 2021-04-22 | Stop reason: SDUPTHER

## 2021-04-16 RX ORDER — PRAVASTATIN SODIUM 40 MG
40 TABLET ORAL DAILY
Status: DISCONTINUED | OUTPATIENT
Start: 2021-04-17 | End: 2021-04-23 | Stop reason: HOSPADM

## 2021-04-16 RX ORDER — ACETAMINOPHEN 650 MG/1
650 SUPPOSITORY RECTAL EVERY 6 HOURS PRN
Status: DISCONTINUED | OUTPATIENT
Start: 2021-04-16 | End: 2021-04-23 | Stop reason: HOSPADM

## 2021-04-16 RX ORDER — ASPIRIN 81 MG/1
81 TABLET ORAL DAILY
Status: DISCONTINUED | OUTPATIENT
Start: 2021-04-17 | End: 2021-04-23 | Stop reason: HOSPADM

## 2021-04-16 RX ORDER — PROMETHAZINE HYDROCHLORIDE 25 MG/1
12.5 TABLET ORAL EVERY 6 HOURS PRN
Status: DISCONTINUED | OUTPATIENT
Start: 2021-04-16 | End: 2021-04-23 | Stop reason: HOSPADM

## 2021-04-16 RX ORDER — 0.9 % SODIUM CHLORIDE 0.9 %
30 INTRAVENOUS SOLUTION INTRAVENOUS ONCE
Status: COMPLETED | OUTPATIENT
Start: 2021-04-16 | End: 2021-04-16

## 2021-04-16 RX ORDER — PANTOPRAZOLE SODIUM 40 MG/1
40 TABLET, DELAYED RELEASE ORAL DAILY PRN
Status: DISCONTINUED | OUTPATIENT
Start: 2021-04-16 | End: 2021-04-23 | Stop reason: HOSPADM

## 2021-04-16 RX ORDER — ACETAMINOPHEN 325 MG/1
650 TABLET ORAL EVERY 6 HOURS PRN
Status: DISCONTINUED | OUTPATIENT
Start: 2021-04-16 | End: 2021-04-23 | Stop reason: HOSPADM

## 2021-04-16 RX ORDER — BENZONATATE 100 MG/1
100 CAPSULE ORAL 3 TIMES DAILY PRN
Status: DISCONTINUED | OUTPATIENT
Start: 2021-04-16 | End: 2021-04-23 | Stop reason: HOSPADM

## 2021-04-16 RX ORDER — CIPROFLOXACIN 2 MG/ML
400 INJECTION, SOLUTION INTRAVENOUS EVERY 12 HOURS
Status: DISCONTINUED | OUTPATIENT
Start: 2021-04-16 | End: 2021-04-23 | Stop reason: HOSPADM

## 2021-04-16 RX ADMIN — VANCOMYCIN HYDROCHLORIDE 1000 MG: 10 INJECTION, POWDER, LYOPHILIZED, FOR SOLUTION INTRAVENOUS at 23:00

## 2021-04-16 RX ADMIN — CEFEPIME 1000 MG: 1 INJECTION, POWDER, FOR SOLUTION INTRAMUSCULAR; INTRAVENOUS at 21:06

## 2021-04-16 RX ADMIN — SODIUM CHLORIDE 2178 ML: 900 INJECTION, SOLUTION INTRAVENOUS at 16:12

## 2021-04-16 ASSESSMENT — ENCOUNTER SYMPTOMS
VOMITING: 0
SHORTNESS OF BREATH: 0
COUGH: 0
DIARRHEA: 0
NAUSEA: 0
ABDOMINAL PAIN: 0

## 2021-04-16 ASSESSMENT — PAIN SCALES - GENERAL
PAINLEVEL_OUTOF10: 0
PAINLEVEL_OUTOF10: 0

## 2021-04-16 NOTE — ED NOTES
Bed: A06-06  Expected date:   Expected time:   Means of arrival:   Comments:  adelina Ortiz RN  04/16/21 1531

## 2021-04-16 NOTE — ED TRIAGE NOTES
Pt is from home. Stated she was out working in the yard when she suddenly felt chills running up her back. Stated her temperature at that time was 100.1. She took 2 Tylenol prior to ca;;omg EMS.

## 2021-04-16 NOTE — ED PROVIDER NOTES
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 She has a past surgical history that includes Colonoscopy (03/30/2011); back surgery (1977); Cervix removal (1964); Colonoscopy (02/23/2009); Cataract removal (5/2012); Colonoscopy (5/8/2013 ); Cystocopy (22371238); Cardiac catheterization (6/6/2013 ); other surgical history (6/27/2013); Aortic valve replacement (7/12/2013 ); Colonoscopy (5/4/2015, 3/7/2016); Upper gastrointestinal endoscopy (3/7/2016); Upper gastrointestinal endoscopy (N/A, 1/28/2019); Colonoscopy (1/28/2019); Colonoscopy (1/28/2019); and Colonoscopy (1/28/2019). Her family history includes Cancer in her sister; Heart Disease in her brother, brother, brother, and father. She reports that she has never smoked. She has never used smokeless tobacco. She reports current alcohol use. She reports that she does not use drugs. Medications     Previous Medications    AMLODIPINE (NORVASC) 2.5 MG TABLET    Take 1 tablet by mouth daily    ASPIRIN 81 MG TABLET    Take 81 mg by mouth daily. BLOOD GLUCOSE MONITOR STRIPS    Test 1-2 times a day & as needed for symptoms of irregular blood glucose.  Dispense sufficient amount for indicated testing frequency plus additional to accommodate PRN testing needs. BLOOD GLUCOSE TEST STRIPS (ONE TOUCH ULTRA TEST) STRIP    1 each by In Vitro route 2 times daily As needed. CETIRIZINE (ZYRTEC) 10 MG TABLET    Take 10 mg by mouth daily as needed for Allergies or Rhinitis     GINKGO BILOBA (GNP GINGKO BILOBA EXTRACT PO)    Take 1 capsule by mouth daily     MAGNESIUM (MAGNESIUM-OXIDE) 250 MG TABS TABLET    Take by mouth daily    MULTIPLE VITAMINS-MINERALS (ONE-A-DAY WOMENS 50 PLUS PO)    Take 1 capsule by mouth daily    NUTRITIONAL SUPPLEMENTS (GLUCERNA ADVANCE SHAKE PO)    Take 1 each by mouth daily    PANTOPRAZOLE (PROTONIX) 40 MG TABLET    TAKE ONE TABLET BY MOUTH DAILY    PRAVASTATIN (PRAVACHOL) 40 MG TABLET    TAKE ONE TABLET BY MOUTH DAILY    VITAMIN D (ERGOCALCIFEROL) 1.25 MG (50312 UT) CAPS CAPSULE    TAKE ONE CAPSULE BY MOUTH ONCE WEEKLY       Allergies     She is allergic to actos [pioglitazone hydrochloride]; lisinopril; penicillins; lipitor; zocor [simvastatin]; and glucophage [metformin hydrochloride]. Physical Exam     INITIAL VITALS: BP: (!) 146/40, Temp: 99.2 °F (37.3 °C), Pulse: 89, Resp: 20, SpO2: 93 %   Physical Exam  Vitals signs and nursing note reviewed. Constitutional:       General: She is not in acute distress. Appearance: She is well-developed. She is not diaphoretic. Cardiovascular:      Rate and Rhythm: Normal rate and regular rhythm. Pulmonary:      Effort: Pulmonary effort is normal.      Breath sounds: Normal breath sounds. Abdominal:      General: Bowel sounds are normal. There is no distension. Palpations: Abdomen is soft. Tenderness: There is no abdominal tenderness. Skin:     General: Skin is warm and dry. Neurological:      Mental Status: She is alert and oriented to person, place, and time.    Psychiatric:         Behavior: Behavior normal.         Diagnostic Results     RADIOLOGY:  XR CHEST (2 VW)    (Results Pending)       LABS:   No results found for this visit on 04/16/21. RECENT VITALS:  BP: (!) 146/40,Temp: 99.2 °F (37.3 °C), Pulse: 89, Resp: 20, SpO2: 93 %       ED Course     Nursing Notes, Past Medical Hx, Past Surgical Hx, Social Hx,Allergies, and Family Hx were reviewed. patient was given the following medications:  Orders Placed This Encounter   Medications    0.9 % sodium chloride IV bolus 2,178 mL       CONSULTS:  None    MEDICAL DECISIONMAKING / ASSESSMENT / Karljay Samuel is a 80 y.o. female presenting with fever at home with no obvious source based on history. The patient had labs drawn and blood cultures obtained given her history of septicemia with Pseudomonas and strep on 2 different occasions. The patient was well-appearing on arrival and did have a low-grade temp likely lowered by the Tylenol she took at home. Labs and blood cultures obtained as well as x-ray and urine. These are pending at this time and care will be turned over to the oncoming provider for final disposition based on final results.        Sally Yañez MD  04/16/21 9146

## 2021-04-17 LAB
ANION GAP SERPL CALCULATED.3IONS-SCNC: 12 MMOL/L (ref 3–16)
BASOPHILS ABSOLUTE: 0.1 K/UL (ref 0–0.2)
BASOPHILS RELATIVE PERCENT: 1.2 %
BUN BLDV-MCNC: 15 MG/DL (ref 7–20)
C-REACTIVE PROTEIN: 29 MG/L (ref 0–5.1)
CALCIUM SERPL-MCNC: 9 MG/DL (ref 8.3–10.6)
CHLORIDE BLD-SCNC: 108 MMOL/L (ref 99–110)
CO2: 20 MMOL/L (ref 21–32)
CREAT SERPL-MCNC: <0.5 MG/DL (ref 0.6–1.2)
EOSINOPHILS ABSOLUTE: 0.1 K/UL (ref 0–0.6)
EOSINOPHILS RELATIVE PERCENT: 0.9 %
GFR AFRICAN AMERICAN: >60
GFR NON-AFRICAN AMERICAN: >60
GLUCOSE BLD-MCNC: 124 MG/DL (ref 70–99)
GLUCOSE BLD-MCNC: 133 MG/DL (ref 70–99)
GLUCOSE BLD-MCNC: 157 MG/DL (ref 70–99)
GLUCOSE BLD-MCNC: 185 MG/DL (ref 70–99)
GLUCOSE BLD-MCNC: 286 MG/DL (ref 70–99)
HCT VFR BLD CALC: 41.2 % (ref 36–48)
HEMOGLOBIN: 14 G/DL (ref 12–16)
LACTIC ACID, SEPSIS: 2.3 MMOL/L (ref 0.4–1.9)
LYMPHOCYTES ABSOLUTE: 0.5 K/UL (ref 1–5.1)
LYMPHOCYTES RELATIVE PERCENT: 5.4 %
MCH RBC QN AUTO: 32 PG (ref 26–34)
MCHC RBC AUTO-ENTMCNC: 33.9 G/DL (ref 31–36)
MCV RBC AUTO: 94.6 FL (ref 80–100)
MONOCYTES ABSOLUTE: 0.5 K/UL (ref 0–1.3)
MONOCYTES RELATIVE PERCENT: 5.8 %
NEUTROPHILS ABSOLUTE: 7.8 K/UL (ref 1.7–7.7)
NEUTROPHILS RELATIVE PERCENT: 86.7 %
PDW BLD-RTO: 14.1 % (ref 12.4–15.4)
PERFORMED ON: ABNORMAL
PLATELET # BLD: 61 K/UL (ref 135–450)
PMV BLD AUTO: 10.1 FL (ref 5–10.5)
POTASSIUM REFLEX MAGNESIUM: 4.1 MMOL/L (ref 3.5–5.1)
RBC # BLD: 4.35 M/UL (ref 4–5.2)
REPORT: NORMAL
SARS-COV-2, PCR: NOT DETECTED
SEDIMENTATION RATE, ERYTHROCYTE: 23 MM/HR (ref 0–30)
SODIUM BLD-SCNC: 140 MMOL/L (ref 136–145)
WBC # BLD: 9 K/UL (ref 4–11)

## 2021-04-17 PROCEDURE — 99223 1ST HOSP IP/OBS HIGH 75: CPT | Performed by: INTERNAL MEDICINE

## 2021-04-17 PROCEDURE — 83605 ASSAY OF LACTIC ACID: CPT

## 2021-04-17 PROCEDURE — 99222 1ST HOSP IP/OBS MODERATE 55: CPT | Performed by: HOSPITALIST

## 2021-04-17 PROCEDURE — 6360000002 HC RX W HCPCS: Performed by: STUDENT IN AN ORGANIZED HEALTH CARE EDUCATION/TRAINING PROGRAM

## 2021-04-17 PROCEDURE — 80048 BASIC METABOLIC PNL TOTAL CA: CPT

## 2021-04-17 PROCEDURE — 85652 RBC SED RATE AUTOMATED: CPT

## 2021-04-17 PROCEDURE — 87081 CULTURE SCREEN ONLY: CPT

## 2021-04-17 PROCEDURE — 87103 BLOOD FUNGUS CULTURE: CPT

## 2021-04-17 PROCEDURE — 85025 COMPLETE CBC W/AUTO DIFF WBC: CPT

## 2021-04-17 PROCEDURE — 1200000000 HC SEMI PRIVATE

## 2021-04-17 PROCEDURE — 6370000000 HC RX 637 (ALT 250 FOR IP): Performed by: STUDENT IN AN ORGANIZED HEALTH CARE EDUCATION/TRAINING PROGRAM

## 2021-04-17 PROCEDURE — 36415 COLL VENOUS BLD VENIPUNCTURE: CPT

## 2021-04-17 PROCEDURE — 2580000003 HC RX 258: Performed by: STUDENT IN AN ORGANIZED HEALTH CARE EDUCATION/TRAINING PROGRAM

## 2021-04-17 PROCEDURE — 6370000000 HC RX 637 (ALT 250 FOR IP): Performed by: COUNSELOR

## 2021-04-17 PROCEDURE — 86140 C-REACTIVE PROTEIN: CPT

## 2021-04-17 RX ORDER — INSULIN LISPRO 100 [IU]/ML
0-6 INJECTION, SOLUTION INTRAVENOUS; SUBCUTANEOUS
Status: DISCONTINUED | OUTPATIENT
Start: 2021-04-17 | End: 2021-04-23 | Stop reason: HOSPADM

## 2021-04-17 RX ORDER — NICOTINE POLACRILEX 4 MG
15 LOZENGE BUCCAL PRN
Status: DISCONTINUED | OUTPATIENT
Start: 2021-04-17 | End: 2021-04-23 | Stop reason: HOSPADM

## 2021-04-17 RX ORDER — DEXTROSE MONOHYDRATE 25 G/50ML
12.5 INJECTION, SOLUTION INTRAVENOUS PRN
Status: DISCONTINUED | OUTPATIENT
Start: 2021-04-17 | End: 2021-04-23 | Stop reason: HOSPADM

## 2021-04-17 RX ORDER — 0.9 % SODIUM CHLORIDE 0.9 %
500 INTRAVENOUS SOLUTION INTRAVENOUS ONCE
Status: COMPLETED | OUTPATIENT
Start: 2021-04-17 | End: 2021-04-17

## 2021-04-17 RX ORDER — DEXTROSE MONOHYDRATE 50 MG/ML
100 INJECTION, SOLUTION INTRAVENOUS PRN
Status: DISCONTINUED | OUTPATIENT
Start: 2021-04-17 | End: 2021-04-23 | Stop reason: HOSPADM

## 2021-04-17 RX ORDER — SODIUM CHLORIDE 9 MG/ML
INJECTION, SOLUTION INTRAVENOUS CONTINUOUS
Status: DISCONTINUED | OUTPATIENT
Start: 2021-04-17 | End: 2021-04-20

## 2021-04-17 RX ORDER — ACETAMINOPHEN 325 MG/1
650 TABLET ORAL ONCE
Status: COMPLETED | OUTPATIENT
Start: 2021-04-17 | End: 2021-04-17

## 2021-04-17 RX ORDER — 0.9 % SODIUM CHLORIDE 0.9 %
500 INTRAVENOUS SOLUTION INTRAVENOUS ONCE
Status: COMPLETED | OUTPATIENT
Start: 2021-04-17 | End: 2021-04-18

## 2021-04-17 RX ORDER — INSULIN LISPRO 100 [IU]/ML
0-3 INJECTION, SOLUTION INTRAVENOUS; SUBCUTANEOUS NIGHTLY
Status: DISCONTINUED | OUTPATIENT
Start: 2021-04-17 | End: 2021-04-23 | Stop reason: HOSPADM

## 2021-04-17 RX ADMIN — ACETAMINOPHEN 650 MG: 325 TABLET ORAL at 20:44

## 2021-04-17 RX ADMIN — CIPROFLOXACIN 400 MG: 2 INJECTION, SOLUTION INTRAVENOUS at 11:45

## 2021-04-17 RX ADMIN — SODIUM CHLORIDE 500 ML: 900 INJECTION, SOLUTION INTRAVENOUS at 23:06

## 2021-04-17 RX ADMIN — Medication 10 ML: at 09:08

## 2021-04-17 RX ADMIN — ASPIRIN 81 MG: 81 TABLET, COATED ORAL at 09:07

## 2021-04-17 RX ADMIN — Medication 5 ML: at 23:16

## 2021-04-17 RX ADMIN — ACETAMINOPHEN 650 MG: 325 TABLET ORAL at 01:08

## 2021-04-17 RX ADMIN — ENOXAPARIN SODIUM 40 MG: 40 INJECTION SUBCUTANEOUS at 09:07

## 2021-04-17 RX ADMIN — MAGNESIUM OXIDE TAB 400 MG (240 MG ELEMENTAL MG) 400 MG: 400 (240 MG) TAB at 09:07

## 2021-04-17 RX ADMIN — BENZONATATE 100 MG: 100 CAPSULE ORAL at 18:31

## 2021-04-17 RX ADMIN — ACETAMINOPHEN 650 MG: 325 TABLET ORAL at 18:30

## 2021-04-17 RX ADMIN — CIPROFLOXACIN 400 MG: 2 INJECTION, SOLUTION INTRAVENOUS at 00:30

## 2021-04-17 RX ADMIN — PRAVASTATIN SODIUM 40 MG: 40 TABLET ORAL at 09:07

## 2021-04-17 RX ADMIN — AMLODIPINE BESYLATE 2.5 MG: 2.5 TABLET ORAL at 09:07

## 2021-04-17 RX ADMIN — ONDANSETRON 4 MG: 2 INJECTION INTRAMUSCULAR; INTRAVENOUS at 15:23

## 2021-04-17 RX ADMIN — ACETAMINOPHEN 650 MG: 325 TABLET ORAL at 14:53

## 2021-04-17 RX ADMIN — SODIUM CHLORIDE: 9 INJECTION, SOLUTION INTRAVENOUS at 09:12

## 2021-04-17 RX ADMIN — INSULIN LISPRO 1 UNITS: 100 INJECTION, SOLUTION INTRAVENOUS; SUBCUTANEOUS at 22:11

## 2021-04-17 RX ADMIN — GUAIFENESIN AND DEXTROMETHORPHAN HYDROBROMIDE 1 TABLET: 600; 30 TABLET, EXTENDED RELEASE ORAL at 00:59

## 2021-04-17 RX ADMIN — INSULIN LISPRO 3 UNITS: 100 INJECTION, SOLUTION INTRAVENOUS; SUBCUTANEOUS at 18:30

## 2021-04-17 RX ADMIN — SODIUM CHLORIDE 500 ML: 900 INJECTION, SOLUTION INTRAVENOUS at 06:04

## 2021-04-17 RX ADMIN — VANCOMYCIN HYDROCHLORIDE 1000 MG: 10 INJECTION, POWDER, LYOPHILIZED, FOR SOLUTION INTRAVENOUS at 11:24

## 2021-04-17 RX ADMIN — INSULIN LISPRO 1 UNITS: 100 INJECTION, SOLUTION INTRAVENOUS; SUBCUTANEOUS at 11:46

## 2021-04-17 RX ADMIN — VANCOMYCIN HYDROCHLORIDE 1000 MG: 10 INJECTION, POWDER, LYOPHILIZED, FOR SOLUTION INTRAVENOUS at 23:06

## 2021-04-17 ASSESSMENT — PAIN SCALES - GENERAL
PAINLEVEL_OUTOF10: 0
PAINLEVEL_OUTOF10: 2
PAINLEVEL_OUTOF10: 3
PAINLEVEL_OUTOF10: 0
PAINLEVEL_OUTOF10: 0

## 2021-04-17 ASSESSMENT — ENCOUNTER SYMPTOMS
DIARRHEA: 0
CONSTIPATION: 0
ALLERGIC/IMMUNOLOGIC NEGATIVE: 1
GASTROINTESTINAL NEGATIVE: 1
VOMITING: 0
WHEEZING: 0
BLOOD IN STOOL: 0
ABDOMINAL PAIN: 0
NAUSEA: 0
STRIDOR: 0
CHOKING: 0
CHEST TIGHTNESS: 0
COUGH: 0
SHORTNESS OF BREATH: 0
COLOR CHANGE: 0
ABDOMINAL DISTENTION: 0
RESPIRATORY NEGATIVE: 1

## 2021-04-17 ASSESSMENT — PAIN - FUNCTIONAL ASSESSMENT: PAIN_FUNCTIONAL_ASSESSMENT: ACTIVITIES ARE NOT PREVENTED

## 2021-04-17 ASSESSMENT — PAIN DESCRIPTION - DESCRIPTORS: DESCRIPTORS: ACHING

## 2021-04-17 ASSESSMENT — PAIN DESCRIPTION - PROGRESSION: CLINICAL_PROGRESSION: NOT CHANGED

## 2021-04-17 ASSESSMENT — PAIN DESCRIPTION - ONSET: ONSET: GRADUAL

## 2021-04-17 ASSESSMENT — PAIN DESCRIPTION - LOCATION: LOCATION: HEAD

## 2021-04-17 NOTE — ED NOTES
Report called to REGINO Gutierrez on 4S. Pt will be transported to room 4321.      Mars Hebert RN  04/16/21 5304

## 2021-04-17 NOTE — PROGRESS NOTES
[facility] [address]    Pt admitted from [place] [address] into room 0423. Connected to telemetry monitoring. Vital signs and head-to-toe assessment performed. Oriented to room, call-light, routine of nurse+physician rounding, frequency of VS/assessments, meal-times, lab-draws, and medication administrations. 4 Eyes Admission Assessment     I agree as the admission nurse that 2 RN's have performed a thorough Head to Toe Skin Assessment on the patient. ALL assessment sites listed below have been assessed on admission. Areas assessed by both nurses:   [x]   Head, Face, and Ears   [x]   Shoulders, Back, and Chest  [x]   Arms, Elbows, and Hands   [x]   Coccyx, Sacrum, and Ischium  [x]   Legs, Feet, and Heels        Does the Patient have Skin Breakdown?   No         Tigre Prevention initiated:  Yes   Wound Care Orders initiated:  No      [facility] nurse consulted for Pressure Injury (Stage 3,4, Unstageable, DTI, NWPT, and Complex wounds) or Tigre score 18 or lower:  No      Nurse 1 eSignature: Electronically signed by Golden Betts RN on 4/16/21 at 9:36 PM EDT    **SHARE this note so that the co-signing nurse is able to place an eSignature**    Nurse 2 eSignature: Electronically signed by Izabela Strong RN on 4/17/21 at 6:38 AM EDT

## 2021-04-17 NOTE — PROGRESS NOTES
Clinical Pharmacy Consult Note    Admit date: 4/16/2021    Subjective/Objective:  86 y.o. female with history of aortic valve replacement with bioprosthetic valve (2013), moderate aortic insufficiency, GERD, MI in 2007, hypertension, and previous history of pseudomonas bacteremia (3/2020) and previously strep (5/2019) who presents from home with fever and chills of 3 day duration. Her prior cultures were reviewed and she grew Streptococcus salivarius in 2019 and Pseudomonas aeruginosa in 2020 (resistant to aztreonam, cefepime, ceftazidime, and zosyn, sensitive to ciprofloxacin and merrem). Pharmacy is consulted to dose Vancomycin per Dr. Rosendo Santos. Pertinent Medications:    Ciprofloxacin 400 mg IV q12h -- day #1  Vancomycin -- Pharmacy to dose -- day #1   1 g IV x1 (4/16)   1 g IV q12h (4/17-current)    Recent Labs     04/16/21  1603 04/17/21  0400   * 140   K 3.7 4.1    108   CO2 22 20*   BUN 17 15   CREATININE 0.5* <0.5*     CrCl cannot be calculated (This lab value cannot be used to calculate CrCl because it is not a number: <0.5). Recent Labs     04/16/21  1603 04/17/21  0400   WBC 8.6 9.0   HGB 14.5 14.0   HCT 41.7 41.2   MCV 93.3 94.6   PLT 69* 61*     Height:  5' 4\" (162.6 cm)  Weight: 166 lb 3.6 oz (75.4 kg)    Micro:  4/16 Blood = sent  4/17 MRSA cx = sent     Assessment/Plan:  1. Sepsis 2/2 likely bacteremia: cipro + vancomycin - day #2  Vancomycin  · Continue 1 g IV q12h. · Will order a trough for tomorrow before the 4th dose. Trough goal 15-20 mcg/mL. · Clinical pharmacist will follow-up in AM.  · Renal function will be monitored closely and dosing will be adjusted as appropriate. Please call with any questions. Thank you for consulting pharmacy!   Jasmyne Villeda, PharmD  PGY1 Pharmacy Resident   Wireless: [phone]  4/17/2021 8:12 AM

## 2021-04-17 NOTE — CONSULTS
Clinical Pharmacy Consult Note       Pharmacy consulted by Dr. Mara Mendoza in ED to order first dose of vancomycin. Indication :   Bacteremia    Patient Data:     Recent Labs     04/16/21  1603   *   K 3.7      CO2 22   BUN 17   CREATININE 0.5*       Estimated Creatinine Clearance: 79 mL/min (A) (based on SCr of 0.5 mg/dL (L)). Recent Labs     04/16/21  1603   WBC 8.6   HGB 14.5   HCT 41.7   MCV 93.3   PLT 69*       Height:  5' 4\" (162.6 cm)  Weight: 160 lb (72.6 kg)    Plan: Will order vancomycin 1000mg IV x 1 dose, to be administered in ED. Please note this consult covers ED vancomycin dose only. If admitting provider would like further vancomycin dose management by pharmacy, please place additional consult order. Thank you for consulting pharmacy! Jose D Resides  PharmD Candidate Class of [address] [id number]  4/16/2021 9:22 PM     I have reviewed and agree with the PharmD Candidate's vancomycin documentation.      Please call with any questions,  Letcher Bloch, PharmD  [phone]

## 2021-04-17 NOTE — ED PROVIDER NOTES
Date of evaluation: 4/16/2021    ADDENDUM:      Care of this patient was assumed from Dr Allyn Andrew. The patient was seen for Fever  . The patient's initial evaluation and plan have been discussed with the prior provider who initially evaluated the patient. Nursing Notes, Past Medical Hx, Past Surgical Hx, Social Hx, Allergies, and Family Hx were all reviewed. Diagnostic Results         RADIOLOGY:  XR CHEST (2 VW)   Final Result      No acute cardiopulmonary disease identified. LABS:   Labs Reviewed   CBC WITH AUTO DIFFERENTIAL - Abnormal; Notable for the following components:       Result Value    Platelets 69 (*)     Neutrophils Absolute 8.1 (*)     Lymphocytes Absolute 0.3 (*)     All other components within normal limits    Narrative:     Performed at: [facility]  [address], [place], [facility] [address]   Phone [phone]   BASIC METABOLIC PANEL W/ REFLEX TO MG FOR LOW K - Abnormal; Notable for the following components:    Sodium 134 (*)     Glucose 165 (*)     CREATININE 0.5 (*)     All other components within normal limits    Narrative:     Performed at: [facility]  [address], [place], [facility] [address]   Phone [phone]   PROCALCITONIN - Abnormal; Notable for the following components:    Procalcitonin 0.85 (*)     All other components within normal limits    Narrative:     Performed at: [facility]  [address], [place], [facility] [address]   Phone [phone]   URINE RT REFLEX TO CULTURE - Abnormal; Notable for the following components:    Blood, Urine TRACE-INTACT (*)     Protein, UA 30 (*)     All other components within normal limits    Narrative:     Performed at:   [facility]  [address], [place], [facility] [address]   Phone [phone]   CULTURE, BLOOD 1   CULTURE, BLOOD 2   CULTURE, BLOOD 1 CULTURE, BLOOD 2   LACTATE, SEPSIS    Narrative:     Performed at: [facility] [employer] [facility] [facility]  [address] [facility], [address]   Phone [phone]   MICROSCOPIC URINALYSIS    Narrative:     Performed at: [facility] [employer] [facility]  [address],  [place], [address]   Phone [phone]   LACTATE, SEPSIS   COVID-19   COVID-19   COVID-19   COVID-19       RECENT VITALS:  BP: (!) 115/41, Temp: 99.4 °F (37.4 °C), Pulse: 84, Resp: 20     Procedures         ED Course     The patient was given the following medications:  Orders Placed This Encounter   Medications    0.9 % sodium chloride IV bolus 2,178 mL    cefepime (MAXIPIME) 1000 mg IVPB minibag     Order Specific Question:   Antimicrobial Indications     Answer:   Bloodstream Infection            CONSULTS:  [place] TO DOSE VANCOMYCIN  IP CONSULT TO [id number] Chip Suh is a 80 y.o. female who presents with fever and chills. When this is happened in the past she has had sepsis. One time she had strep positive blood cultures then another time she had Pseudomonas. There is never been a good source found. The last admission mission the GI specialist felt it may be in a GI source for a Pseudomonas sepsis. She does have a bioprosthetic valve. Here her temp was 99 4 but then she started becoming slightly hypoxic therefore placed on oxygen. Chest x-ray was unremarkable urine unremarkable white count normal with 94 segs. Prolactin was elevated at 0.85. At this time I spoke with Dr. Sadie Augustine he asked that we get 2 more blood cultures and then give the patient IV antibiotics. I ordered the 2 blood cultures and then also ordered vancomycin and cefepime. Clinical Impression     1. Septicemia ([address]        Disposition/Plan     PATIENT REFERRED TO:  No follow-up provider specified.     DISCHARGE MEDICATIONS:  New Prescriptions    No medications on file       DISPOSITION admit       Guerita Stevens MD  04/16/21 2049

## 2021-04-17 NOTE — CONSULTS
Pharmacy Note  Vancomycin Consult    Angela Bravo is a 80 y.o. female started on Vancomycin for fever and chills (Bacteremia) ; consult received from Dr. Abdiel Ybarra to manage therapy. Also receiving the following antibiotics:   · Ciprofloxacin 400 IV every 12 hours - day #1    Patient Active Problem List   Diagnosis    Hypertension    Hypercholesteremia    Vitamin D deficiency    Coronary atherosclerosis    Transient cerebral ischemia    Asthma    Peptic ulcer    Benign neoplasm of colon    Allergic rhinitis    Internal hemorrhoids    Carcinoma in situ of cervix uteri    Diverticulosis of sigmoid colon    Sepsis secondary to UTI ([facility] [address]    Nephrolithiasis    Osteopenia    S/P AVR (aortic valve replacement)    Syncope and collapse    Bifascicular block    Thrombocytopenia (HCC)    Pseudomonal bacteremia    History of aortic valve replacement with bioprosthetic valve    Hypertension    Acute bacterial sinusitis    Fever and chills    Nausea and vomiting     Allergies:  Actos [pioglitazone hydrochloride], Lisinopril, Penicillins, Lipitor, Zocor [simvastatin], and Glucophage [metformin hydrochloride]       Recent Labs     04/16/21  1603 04/17/21  0400   BUN 17  --    CREATININE 0.5*  --    WBC 8.6 9.0     No intake or output data in the 24 hours ending 04/17/21 0858    Micro: Blood culture is pending. Ht Readings from Last 1 Encounters:   04/16/21 5' 4\" (1.626 m)        Wt Readings from Last 1 Encounters:   04/16/21 166 lb 3.6 oz (75.4 kg)       Body mass index is 28.53 kg/m². Estimated Creatinine Clearance: 80 mL/min (A) (based on SCr of 0.5 mg/dL (L)). Goal Trough Level: ~15 mcg/mL    Assessment/Plan:  · Patient received vancomycin 1000 mg IVx1 prior to this consult. · Will continue therapy with vancomycin 1000 mg IV every 12 hours (~13 mg/kg). · Timing of trough level will be determined based on culture results, renal function, and clinical response.    · Pharmacy will continue to follow and adjust therapy as appropriate. Thank you for the consult.

## 2021-04-17 NOTE — H&P
Internal Medicine PGY-3 Resident History & Physical      PCP: Angel Hand MD    Date of Admission: 4/16/2021    Date of Service: Pt seen/examined on 4/17/21 and Admitted to Inpatient with expected LOS greaterthan two midnights due to medical therapy. Chief Complaint:  Fevers/chills       History Of Present Illness:     Get Stephens is an 80 y.o. female with history of aortic valve replacement with bioprosthetic valve (2013), moderate aortic insufficiency, GERD, MI in 2007, hypertension, and previous history of pseudomonas bacteremia (3/2020) and previously strep (5/2019) who presents from home with fever and chills of 3 day duration. The patient was working outside in the yard this morning and when she went inside she noticed that she began chilling and then developed rigors and felt that she needed to lay down in bed to cover herself up to keep warm. She reported subjective fevers at home and stated she felt similar to how she felt when she had her prior bloodstream infections. She called 911 to have her brought to the hospital because she was still feeling the rigors. She states that she has had bloodstream infections in the past but that a source was never identified. She is not complaining of any chest pain or shortness of breath, has no abdominal pain, dysuria, or pain anywhere or headache neck pain or skin infections. She did have her Covid vaccination series, last one March 11. In the ED she was given a dose of vancomycin and cefepime. Her prior cultures were reviewed and she grew Streptococcus salivarius in 2019 and Pseudomonas aeruginosa in 2020 (resistant to aztreonam, cefepime, ceftazidime, and zosyn, sensitive to ciprofloxacin and merrem). Temperature on admission was 99.4, however she had taken tylenol at home prior.      Past Medical History:          Diagnosis Date    Allergic rhinitis, cause unspecified     Allergic urticaria     Angioneurotic edema not elsewhere classified     Aortic stenosis 12/9/2011    dx 11/2007 - echo - LVEF 55% , AV area 1.5 cm , peak velocity - 27 ECHO 6/2009 - LVEF 60%, AV area 1.3 cm , peak velocity 35  ECHO 12/1/2011 - LVEF -60-65%, AV area 0.99 cm , peak velocity 34  asymptomatic      Benign neoplasm of colon     Cancer ([address]     cervical 1964    Carcinoma in situ of cervix uteri     Coronary atherosclerosis of unspecified type of vessel, native or graft     Essential hypertension, benign     History of kidney stones     Internal hemorrhoids without mention of complication     MI, old 2007    Other and unspecified hyperlipidemia     Palpitations     Peptic ulcer, unspecified site, unspecified as acute or chronic, without mention of hemorrhage, perforation, or obstruction     Sebaceous cyst of breast 5/31/2012    Trigger finger 1/8/2013    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 Frederick Perera  6/6/2013     Dr. Ricky Nagy - coronaries WNL , LVEF WNL    Ronald Irving  5/2012    Dr. Shonda Sneed - [facility]    Dr. Garcia [facility]    COLONOSCOPY  03/30/2011    DR. Mello  - Internal hemorrhoids, sigmoid diverticulosis, hyperplastic polyps. Repeat in five  years.  COLONOSCOPY  02/23/2009    nternal hemorrhoids, Diverticulosis, Colon polyps.   Repeat two years    COLONOSCOPY  5/8/2013     Dr. Reed Moritz  - internal hemorrhoids , diverticulosis and polyps     COLONOSCOPY  5/4/2015, 3/7/2016    - hemorrhoids, diverticulosis, hyperplastic colon polyps, repeat in 1 year    COLONOSCOPY  1/28/2019    COLONOSCOPY WITH BIOPSY performed by Natividad Mtz MD at [address]  1/28/2019    COLONOSCOPY POLYPECTOMY ABLATION performed by Natividad Mtz MD at [address]  1/28/2019    COLONOSCOPY CONTROL HEMORRHAGE performed by Natividad Mtz MD at [address] [facility]  [phone]     DR. Dacosta - CYSTOSCOPY RETROGRADE, RIGHT STENT PLACEMENT    OTHER SURGICAL HISTORY  6/27/2013    Dr. Brandy Henderson - CYSTOSCOPY RIGHT URETEROSCOPY, RENAL STONE EXTRACTION    UPPER GASTROINTESTINAL ENDOSCOPY  3/7/2016    gastritis and esophagitis     UPPER GASTROINTESTINAL ENDOSCOPY N/A 1/28/2019    EGD BIOPSY performed by Natividad Mtz MD at [facility] ENDOSCOPY       Medications Prior to Admission:      Prior to Admission medications    Medication Sig Start Date End Date Taking? Authorizing Provider   pantoprazole (PROTONIX) 40 MG tablet Take 40 mg by mouth daily as needed (heartburn symptoms)   Yes Historical Provider, MD   magnesium (MAGNESIUM-OXIDE) 250 MG TABS tablet Take 250 mg by mouth daily    Yes Historical Provider, MD   pravastatin (PRAVACHOL) 40 MG tablet TAKE ONE TABLET BY MOUTH DAILY 12/21/20  Yes Ricky Barlow MD   amLODIPine (NORVASC) 2.5 MG tablet Take 1 tablet by mouth daily 12/17/20  Yes Ricky Barlow MD   vitamin D (ERGOCALCIFEROL) 1.25 MG (68848 UT) CAPS capsule TAKE ONE CAPSULE BY MOUTH ONCE WEEKLY 12/17/20  Yes Ricky Barlow MD   Ginkgo Biloba (GNP GINGKO BILOBA EXTRACT PO) Take 1 capsule by mouth daily    Yes Historical Provider, MD   Multiple Vitamins-Minerals (ONE-A-DAY WOMENS 50 PLUS PO) Take 1 capsule by mouth daily   Yes Historical Provider, MD   cetirizine (ZYRTEC) 10 MG tablet Take 10 mg by mouth daily as needed for Allergies or Rhinitis    Yes Historical Provider, MD   aspirin 81 MG tablet Take 81 mg by mouth daily.    Yes Historical Provider, MD   blood glucose monitor strips Test 1-2 times a day & as needed for symptoms of irregular blood glucose. Dispense sufficient amount for indicated testing frequency plus additional to accommodate PRN testing needs. 2/23/21   Praveen Briseno MD   blood glucose test strips (ONE TOUCH ULTRA TEST) strip 1 each by In Vitro route 2 times daily As needed. 2/11/21   Praveen Briseno MD       Allergies:  Actos [pioglitazone hydrochloride], Lisinopril, Penicillins, Lipitor, Zocor [simvastatin], and Glucophage [metformin hydrochloride]    Social History:      The patient currently lives at home alone    TOBACCO:   reports that she has never smoked. She has never used smokeless tobacco.  ETOH:  reports current alcohol use. Family History:      Reviewed in detail and negative for DM, CAD, Cancer, CVA. Positive as follows:        Problem Relation Age of Onset    Heart Disease Father     Cancer Sister         pancreatic cancer    Heart Disease Brother     Heart Disease Brother     Heart Disease Brother        REVIEW OF SYSTEMS: Pertinent positives as noted in the HPI. All other systems reviewed and negative. ROS: Review of Systems   Constitutional: Positive for chills, fatigue and fever. Negative for activity change, appetite change and diaphoresis. HENT: Negative. Negative for congestion, drooling and ear pain. Respiratory: Negative. Negative for cough, choking, chest tightness, shortness of breath, wheezing and stridor. Cardiovascular: Negative. Negative for chest pain, palpitations and leg swelling. Gastrointestinal: Negative. Negative for abdominal distention, abdominal pain, blood in stool, constipation, diarrhea, nausea and vomiting. Endocrine: Positive for cold intolerance. Genitourinary: Negative. Negative for difficulty urinating, dysuria, flank pain, pelvic pain and urgency. Musculoskeletal: Negative. Negative for arthralgias, gait problem, joint swelling, myalgias and neck stiffness. Skin: Negative. Negative for color change, pallor, rash and wound. Allergic/Immunologic: Negative. Neurological: Negative. Hematological: Negative. Psychiatric/Behavioral: Negative. PHYSICAL EXAM PERFORMED:    BP (!) 140/33   Pulse 72   Temp 99.1 °F (37.3 °C) (Oral)   Resp 14   Ht 5' 4\" (1.626 m)   Wt 166 lb 3.6 oz (75.4 kg)   SpO2 97%   BMI 28.53 kg/m²     General appearance:  No apparent distress, appears stated age and cooperative. HEENT:  Normal cephalic,atraumatic without obvious deformity. Pupils equal, round, and reactive to light. Extra ocular muscles intact. Conjunctivae/corneas clear. Edentulous. No caries noted. Neck: Supple, with full range of motion. No jugular venous distention. Trachea midline. Respiratory:  Normal respiratory effort. Clear to auscultation, bilaterally without Rales/Wheezes/Rhonchi. Cardiovascular:  Regular rate and rhythm with normal S1/S2. Diastolic murmur heard along the left sternal border  Abdomen: Soft, non-tender, non-distended with normal bowel sounds. Musculoskeletal:  No clubbing, cyanosis or edema bilaterally. Full range of motion without deformity. No septic or swollen appearing joints. Skin: Skin color, texture, turgor normal.  No rashes or lesions. No boils, pimples or sores. Neurologic:  Neurovascularly intact without any focal sensory/motor deficits. Cranialnerves: II-XII intact, grossly non-focal.  Psychiatric:  Alert and oriented, thought content appropriate,normal insight  Capillary Refill: Brisk,< 3 seconds   Peripheral Pulses: +2 palpable, equal bilaterally     Labs:     Recent Labs     04/16/21  1603 04/17/21  0400   WBC 8.6 9.0   HGB 14.5 14.0   HCT 41.7 41.2   PLT 69* 61*     Recent Labs     04/16/21  1603 04/17/21  0400   * 140   K 3.7 4.1    108   CO2 22 20*   BUN 17 15   CREATININE 0.5* <0.5*   CALCIUM 9.4 9.0     No results for input(s): AST, ALT, BILIDIR, BILITOT, ALKPHOS in the last 72 hours.   No results for input(s): INR in the last 72 hours. No results for input(s): Ino Palomares in the last 72 hours. Urinalysis:      Lab Results   Component Value Date    NITRU Negative 04/16/2021    WBCUA None seen 04/16/2021    BACTERIA 1+ 03/31/2020    RBCUA None seen 04/16/2021    BLOODU TRACE-INTACT 04/16/2021    SPECGRAV 1.015 04/16/2021    GLUCOSEU Negative 04/16/2021       Radiology:     EKG:  I have reviewed the EKG with the following interpretation: NSR, old LAD, old RBBB    XR CHEST (2 VW)   Final Result      No acute cardiopulmonary disease identified. Last echo reviewed:  8/26/2020  - Left ventricle: The cavity size is normal. Wall thickness was increased in a pattern of mild   LVH. Systolic function was normal. The estimated ejection fraction was in the range of 55% to 60%. Wall    motion was normal; there were no regional wall motion abnormalities. Doppler parameters are    consistent with abnormal left ventricular relaxation (grade 1 diastolic dysfunction). - Ventricular septum: The outflow septum has a sigmoid appearance. - Aortic valve: Poorly visualized. There is a 19 mm [facility] Ismael bioprosthesis by history. Transvalvular    velocity is above the expected range. Moderate, very eccentric regurgitation (could be    underestimated). The peak systolic velocity is 1.0Q/ZWD. The acceleration time is 107ms. The   mean    systolic gradient is 14WL Hg. The LVOT to aortic valve VTI ratio is 0.44. The valve area by   the    velocity-time integral method is 1.3cm^2. The ratio of LVOT to aortic valve peak velocity is   0.4. The valve area by the peak velocity method is 1.2cm^2. - Mitral valve: Calcified annulus. The findings are consistent with mild stenosis. The mean    diastolic gradient is 3mm Hg. The valve area by pressure half-time is 1.7cm^2. HR 65 bpm.  - Left atrium: The atrium is mildly dilated. - Right ventricle:  The cavity size is normal. Systolic function was normal. TAPSE: 1.9cm.  - Pulmonary arteries: Systolic pressure was within the normal range, = 30mm Hg assuming that   the    right atrial pressure was 3 mmHg. Old blood culture sensitivities reviewed:    Susceptibility     Pseudomonas aeruginosa (4/4/2020)    Antibiotic Interpretation PEBBLES   aztreonam Resistant >16 mcg/mL   cefepime Resistant >16 mcg/mL   cefTAZidime Resistant >16 mcg/mL   ciprofloxacin Sensitive <=1 mcg/mL   gentamicin Sensitive <=4 mcg/mL   meropenem Sensitive <=1 mcg/mL   piperacillin-tazobactam Resistant >64 mcg/mL   tobramycin Sensitive <=4 mcg/mL       Streptococcus salivarius (5/29/2019)    Antibiotic Interpretation PEBBLES   ampicillin Sensitive 0.12 mcg/mL   cefepime Sensitive <=0.25 mcg/mL   cefotaxime Sensitive <=0.25 mcg/mL   cefTRIAXone Sensitive <=0.25 mcg/mL   clindamycin Sensitive <=0.06 mcg/mL   erythromycin Resistant >0.5 mcg/mL   levofloxacin Sensitive 0.5 mcg/mL   penicillin Sensitive 0.06 mcg/mL   tetracycline Sensitive <=0.5 mcg/mL   vancomycin Sensitive 1 mcg/mL       ASSESSMENT & PLAN:    Madison Valdes is a 80 y.o. female w/ PMHx bioprosthetic aortic valve, AI and recurrent blood stream infections presents with fevers and rigors. Active Hospital Problems    Diagnosis Date Noted    Fever and chills [R50.9] 03/31/2020     Sepsis 2/2 likely bacteremia, source uncertain  -Tmax 80.5 (after tylenol), lactate 2.3, RR 20, elevated procal 0.85  -s/p sepsis protocol fluids  -UA and CXR negative for infection   -f/u blood cultures x2  -f/u lactate to resolution  -Her prior cultures were reviewed and she grew Streptococcus salivarius in 2019 and Pseudomonas aeruginosa in 2020 (resistant to aztreonam, cefepime, ceftazidime, and zosyn, sensitive to ciprofloxacin and merrem).    -received dose of vanc and cefepime in ED  -continued on vancomycin and cipro 400 mg IV BID  -appreciate infectious disease recommendations  -f/u repeat ECHO, even if negative, may still require BRAYAN for further evaluation of the bioprosthetic valve for vegetations, dehiscence or worsening of aortic insufficiency  -continue maintenance fluids @ 100 cc/hr  -f/u COVID swab and MRSA nares    HTN  -continue home norvasc 2.5 mg PO qD    HLD  -continue home pravastatin 40 mg PO qD      DVT Prophylaxis: lovenox  Diet: DIET GENERAL;  Code Status: Limited    PT/OT Eval Status: ordered    Dispo - GMF    I will discuss the patient with MD Reese Dawson MD  Internal Medicine Resident,PGY-3    Addendum to Resident H& P/Progress note:  I have personally seen,examined and evaluated the patient.  I have reviewed the current history, physical findings, labs and assessment and plan and agree with note as documented by resident MD ( Chio Arredondo)      Dannielle Weathers MD, Agnes Acosta

## 2021-04-17 NOTE — CONSULTS
Infectious Diseases Inpatient Consult Note      Reason for Consult:  Sepsis, Fevers, chills and h/o AVR - Bioprosthetic      Requesting Physician:       Primary Care Physician:  Ousmane Castro MD    History Obtained From:  Epic and patient     CHIEF COMPLAINT:     Chief Complaint   Patient presents with    Fever       Fevers, and chills acute onset     HISTORY OF PRESENT ILLNESS:  80 y.o. woman with h/o AVR - [employer] Bioprosthetic in 2013  and mitral stenosis, h/o PSEUDOMONAS bacteremia in 3/31/20 and  Strep salivarius bacteremia in 5/29/2019 admitted with acute onset of chills, fevers, sweats and not feeling well she was gardening the morning before admit and every thing was normal that day and now presented with acute chills lasting for x 1 hr with sweats, high fevers, fatigue, WBC normal but lactic acid elevation. Procal elevated and TTE in Aug 2020 some concern on the valve with increased gradient.    Location : fever, chills chest pain sob,      Quality : aching        Severity : 4/10    Duration : acute onset       Timing : intermittent   Context :  H/o bacteremias in 2020 and 2019     Modifying factors : none   Associated signs and symptoms:Fevers, chills sweats, fatigue         Past Medical History:    Past Medical History:   Diagnosis Date    Allergic rhinitis, cause unspecified     Allergic urticaria     Angioneurotic edema not elsewhere classified     Aortic stenosis 12/9/2011    dx 11/2007 - echo - LVEF 55% , AV area 1.5 cm , peak velocity - 27 ECHO 6/2009 - LVEF 60%, AV area 1.3 cm , peak velocity 35  ECHO 12/1/2011 - LVEF -60-65%, AV area 0.99 cm , peak velocity 34  asymptomatic      Benign neoplasm of colon     Cancer ([address]     cervical 1964    Carcinoma in situ of cervix uteri     Coronary atherosclerosis of unspecified type of vessel, native or graft     Essential hypertension, benign     History of kidney stones     Internal hemorrhoids without mention of complication  MI, old 2007    Other and unspecified hyperlipidemia     Palpitations     Peptic ulcer, unspecified site, unspecified as acute or chronic, without mention of hemorrhage, perforation, or obstruction     Sebaceous cyst of breast 5/31/2012    Trigger finger 1/8/2013    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 [facility]  6/6/2013     Dr. Gilbert Jacobo - coronaries WNL , LVEF WNL    Elio Benigno  5/2012    Dr. Staci Mora - bilateral    Everlyn Goltz    Dr. Mai Cazares    COLONOSCOPY  03/30/2011    DR. Mello  - Internal hemorrhoids, sigmoid diverticulosis, hyperplastic polyps. Repeat in five  years.  COLONOSCOPY  02/23/2009    nternal hemorrhoids, Diverticulosis, Colon polyps. Repeat two years    COLONOSCOPY  5/8/2013     Dr. Alex Alcantar  - internal hemorrhoids , diverticulosis and polyps     COLONOSCOPY  5/4/2015, 3/7/2016    - hemorrhoids, diverticulosis, hyperplastic colon polyps, repeat in 1 year    COLONOSCOPY  1/28/2019    COLONOSCOPY WITH BIOPSY performed by Kirsten Payton MD at [facility]  1/28/2019    COLONOSCOPY POLYPECTOMY ABLATION performed by Kirsten Payton MD at [facility]  1/28/2019    COLONOSCOPY CONTROL HEMORRHAGE performed by Kirsten Payton MD at [address] [facility]  [phone]     DR. Dacosta - CYSTOSCOPY RETROGRADE, RIGHT STENT PLACEMENT    OTHER SURGICAL HISTORY  6/27/2013    Dr. Nicolas Woods  [facility] RIGHT URETEROSCOPY, RENAL STONE EXTRACTION    UPPER GASTROINTESTINAL ENDOSCOPY  3/7/2016    gastritis and esophagitis     UPPER GASTROINTESTINAL ENDOSCOPY N/A 1/28/2019    EGD BIOPSY performed by Edwige Link MD at [facility] ENDOSCOPY       Current Medications:    Outpatient Medications Marked as Taking for the 4/16/21 encounter [facility] Encounter)   Medication Sig Dispense Refill    pantoprazole (PROTONIX) 40 MG tablet Take 40 mg by mouth daily as needed (heartburn symptoms)      magnesium (MAGNESIUM-OXIDE) 250 MG TABS tablet Take 250 mg by mouth daily       pravastatin (PRAVACHOL) 40 MG tablet TAKE ONE TABLET BY MOUTH DAILY 90 tablet 3    amLODIPine (NORVASC) 2.5 MG tablet Take 1 tablet by mouth daily 90 tablet 3    vitamin D (ERGOCALCIFEROL) 1.25 MG (52271 UT) CAPS capsule TAKE ONE CAPSULE BY MOUTH ONCE WEEKLY 12 capsule 3    Ginkgo Biloba (GNP GINGKO BILOBA EXTRACT PO) Take 1 capsule by mouth daily       Multiple Vitamins-Minerals (ONE-A-DAY WOMENS 50 PLUS PO) Take 1 capsule by mouth daily      cetirizine (ZYRTEC) 10 MG tablet Take 10 mg by mouth daily as needed for Allergies or Rhinitis       aspirin 81 MG tablet Take 81 mg by mouth daily.          Allergies:  Actos [pioglitazone hydrochloride], Lisinopril, Penicillins, Lipitor, Zocor [simvastatin], and Glucophage [metformin hydrochloride]    Immunizations :   Immunization History   Administered Date(s) Administered    COVID-19, Moderna, PF, 100mcg/0.5mL 02/11/2021, 03/11/2021    Influenza 10/09/2012    Influenza Virus Vaccine 10/10/2010, 10/01/2011, 10/16/2013, 09/22/2014    Influenza, High Dose (Fluzone 65 yrs and older) 11/13/2015, 10/25/2017, 11/27/2018    Influenza, Quadv, IM, PF (6 mo and older Fluzone, Flulaval, Fluarix, and 3 yrs and older Afluria) 12/20/2016    Influenza, Quadv, adjuvanted, 65 yrs +, IM, PF (Fluad) 10/08/2020    Influenza, Triv, inactivated, subunit, adjuvanted, IM (Fluad 65 yrs and older) 12/06/2019    Pneumococcal Conjugate 13-valent ([id number]) 06/30/2015    Pneumococcal Polysaccharide ([id number]) 10/01/2003, 08/10/2011    Tdap (Boostrix, Adacel) 08/10/2011         Social History:    Social History     Tobacco Use    Smoking status: Never Smoker    Smokeless tobacco: Never Used   Substance Use Topics    Alcohol use: Yes     Alcohol/week: 0.0 standard drinks     Comment: Occasional glass of wine    Drug use: No     Social History     Tobacco Use   Smoking Status Never Smoker   Smokeless Tobacco Never Used      Family History   Problem Relation Age of Onset    Heart Disease Father     Cancer Sister         pancreatic cancer    Heart Disease Brother     Heart Disease Brother     Heart Disease Brother          REVIEW OF SYSTEMS:      Constitutional:  fevers + , chills + , night sweats  Eyes:  negative for blurred vision, eye discharge, visual disturbance   HEENT:  negative for hearing loss, ear drainage,nasal congestion  Respiratory:  negative for cough, shortness of breath or hemoptysis   Cardiovascular: =  chest pain + , palpitations, syncope  Gastrointestinal:  negative for nausea, vomiting, diarrhea, constipation, abdominal pain  Genitourinary:  negative for frequency, dysuria, urinary incontinence, hematuria  Hematologic/Lymphatic:  negative for easy bruising, bleeding and lymphadenopathy  Allergic/Immunologic:  negative for recurrent infections, angioedema, anaphylaxis   Endocrine:  negative for weight changes, polyuria, polydipsia and polyphagia  Musculoskeletal:  negative for joint  pain, swelling, decreased range of motion  Integumentary: No rashes, skin lesions  Neurological:  negative for headaches, slurred speech, unilateral weakness  Psychiatric: negative for hallucinations,confusion,agitation.      PHYSICAL EXAM:      Vitals:    BP (!) 120/35   Pulse 94   Temp 99.4 °F (37.4 °C) (Oral)   Resp 16   Ht 5' 4\" (1.626 m)   Wt 166 lb 3.6 oz (75.4 kg)   SpO2 96%   BMI 28.53 kg/m²     General Appearance: alert,in some  acute distress, +  pallor, no icterus   Skin: warm and dry, no rash or erythema  Head: normocephalic and atraumatic  Eyes: pupils equal, round, and reactive to light, conjunctivae normal  ENT: tympanic membrane, external ear and ear canal normal bilaterally, nose without deformity, nasal mucosa and turbinates normal without polyps  Neck: supple and non-tender without mass, no thyromegaly  no cervical lymphadenopathy  Pulmonary/Chest:  Bi basal crepts+ wheezes, rales or rhonchi, normal air movement, no respiratory distress  Cardiovascular:   S1 and S2, ESM++ murmurs, rubs, clicks, or gallops, no carotid bruits  Abdomen: soft, non-tender, non-distended, normal bowel sounds, no masses or organomegaly  Extremities: no cyanosis, clubbing or edema  Musculoskeletal: normal range of motion, no joint swelling, deformity or tenderness  Integumentary: No rashes, no abnormal skin lesions, no petechiae  Neurologic: reflexes normal and symmetric, no cranial nerve deficit  Psych:  Orientation, sensorium, mood normal   Lines: iv    DATA:    CBC:   Lab Results   Component Value Date    WBC 9.0 04/17/2021    HGB 14.0 04/17/2021    HCT 41.2 04/17/2021    MCV 94.6 04/17/2021    PLT 61 (L) 04/17/2021     RENAL:   Lab Results   Component Value Date    CREATININE <0.5 (L) 04/17/2021    BUN 15 04/17/2021     04/17/2021    K 4.1 04/17/2021     04/17/2021    CO2 20 (L) 04/17/2021     SED RATE:   Lab Results   Component Value Date    SEDRATE 23 04/17/2021     CK: No results found for: CKTOTAL  CRP:   Lab Results   Component Value Date    CRP 29.0 04/17/2021     Hepatic Function Panel:   Lab Results   Component Value Date    ALKPHOS 61 02/26/2021    ALT 21 02/26/2021    AST 33 02/26/2021    PROT 7.0 02/26/2021    PROT 7.4 10/09/2012    BILITOT 1.0 02/26/2021    BILIDIR <0.2 05/18/2020    IBILI see below 05/18/2020    LABALBU 4.0 02/26/2021     UA:  Lab Results   Component Value Date    COLORU Yellow 04/16/2021    CLARITYU Clear 04/16/2021    GLUCOSEU Negative 04/16/2021    BILIRUBINUR Negative 04/16/2021    BILIRUBINUR neg 11/13/2015    KETUA Negative 04/16/2021    SPECGRAV 1.015 04/16/2021    BLOODU TRACE-INTACT 04/16/2021    PHUR 6.0 04/16/2021    PROTEINU 30 04/16/2021    UROBILINOGEN 0.2 04/16/2021    NITRU Negative 04/16/2021    LEUKOCYTESUR Negative 04/16/2021    LABMICR YES 04/16/2021    URINETYPE NotGiven 04/16/2021      Urine Microscopic:   Lab Results   Component Value Date    BACTERIA 1+ 03/31/2020    WBCUA None seen 04/16/2021    RBCUA None seen 04/16/2021    EPIU 0-1 04/16/2021     Urine Reflex to Culture:   Lab Results   Component Value Date    URRFLXCULT Not Indicated 04/16/2021       Lactic acid  2.3     procal  0.85     MICRO: cultures reviewed and updated by me Narrative  Performed by: [facility]  ORDER#: [id number]                          ORDERED BY: Gallito Bernal   SOURCE: Blood Antecubital-Rig              COLLECTED:  03/31/20 18:34   ANTIBIOTICS AT [facility]. :                      RECEIVED :  04/01/20 04:14   CALL  Payan  [id number] tel. [phone],   Microbiology results called to and read back by Preet Viramontes RN, 04/04/2020   08:46, by [facility]   Microbiology results called to and read back by [facility],   04/01/2020 21:16, by [facility]   Microbiology results called to and read back by ni caldera, 04/01/2020   21:15, by [facility]   If child <=2 yrs old please draw pediatric bottle. ~Blood Culture #1   Performed at:   [place] [address]   Phone [phone]   Susceptibility    Pseudomonas aeruginosa (2)    Antibiotic Interpretation PEBBLES Status    aztreonam Resistant >16 mcg/mL     cefepime Resistant >16 mcg/mL     cefTAZidime Resistant >16 mcg/mL     ciprofloxacin Sensitive <=1 mcg/mL     gentamicin Sensitive <=4 mcg/mL     meropenem Sensitive <=1 mcg/mL     piperacillin-tazobactam Resistant >64 mcg/mL panel. 03/31/2020    BLOODCULT2  03/31/2020     POSITIVE for  No further workup  Isolated two of two sets  Refer to culture [id number] for sensitivity results         Viral Culture:    Lab Results   Component Value Date    COVID19 Not Detected 08/24/2020   Study Conclusions     - Left ventricle: The cavity size is normal. Wall thickness was increased in a pattern of mild   LVH. Systolic function was normal. The estimated ejection fraction was in the range of 55% to 60%. Wall    motion was normal; there were no regional wall motion abnormalities. Doppler parameters are    consistent with abnormal left ventricular relaxation (grade 1 diastolic dysfunction). - Ventricular septum: The outflow septum has a sigmoid appearance. - Aortic valve: Poorly visualized. There is a 19 mm [facility] [employer] bioprosthesis by history. Transvalvular    velocity is above the expected range. Moderate, very eccentric regurgitation (could be    underestimated). The peak systolic velocity is 1.9I/KLY. The acceleration time is 107ms. The   mean    systolic gradient is 24LO Hg. The LVOT to aortic valve VTI ratio is 0.44. The valve area by   the    velocity-time integral method is 1.3cm^2. The ratio of LVOT to aortic valve peak velocity is   0.4. The valve area by the peak velocity method is 1.2cm^2. - Mitral valve: Calcified annulus. The findings are consistent with mild stenosis. The mean    diastolic gradient is 3mm Hg. The valve area by pressure half-time is 1.7cm^2. HR 65 bpm.  - Left atrium: The atrium is mildly dilated. - Right ventricle: The cavity size is normal. Systolic function was normal. TAPSE: 1.9cm.  - Pulmonary arteries: Systolic pressure was within the normal range, = 30mm Hg assuming that   the    right atrial pressure was 3 mmHg.     Recommendations:  Recommend BRAYAN for further evaluation of the  aortic prosthesis if clinically indicated. Urine Culture: No results for input(s): LABURIN in the last 72 hours.     Scheduled Meds:   insulin lispro  0-6 Units Subcutaneous TID WC    insulin lispro  0-3 Units Subcutaneous Nightly    vancomycin  1,000 mg Intravenous Q12H    aspirin  81 mg Oral Daily    amLODIPine  2.5 mg Oral Daily    magnesium oxide  400 mg Oral Daily    pravastatin  40 mg Oral Daily    sodium chloride flush  5-40 mL Intravenous 2 times per day    enoxaparin  40 mg Subcutaneous Daily    ciprofloxacin  400 mg Intravenous Q12H       Continuous Infusions:   dextrose      sodium chloride 100 mL/hr at 04/17/21 0912    sodium chloride         PRN Meds:  glucose, dextrose, glucagon (rDNA), dextrose, pantoprazole, sodium chloride flush, sodium chloride, promethazine **OR** ondansetron, polyethylene glycol, acetaminophen **OR** acetaminophen, perflutren lipid microspheres, benzonatate, dextromethorphan-guaiFENesin    Imaging:   XR CHEST (2 VW)   Final Result      No acute cardiopulmonary disease identified. All pertinent images and reports for the current Hospitalization were reviewed by me.     IMPRESSION:    Patient Active Problem List   Diagnosis    Hypertension    Hypercholesteremia    Vitamin D deficiency    Coronary atherosclerosis    Transient cerebral ischemia    Asthma    Peptic ulcer    Benign neoplasm of colon    Allergic rhinitis    Internal hemorrhoids    Carcinoma in situ of cervix uteri    Diverticulosis of sigmoid colon    Sepsis secondary to UTI ([facility] [address]    Nephrolithiasis    Osteopenia    S/P AVR (aortic valve replacement)    Syncope and collapse    Bifascicular block    Thrombocytopenia (HCC)    Pseudomonal bacteremia    History of aortic valve replacement with bioprosthetic valve    Hypertension    Acute bacterial sinusitis    Fever and chills    Nausea and vomiting     Sepsis  Fevers and chills  Procal elevation   Lactic acidosis  AVR 2013 Bio prosthetic for Aortic stenosis -   H/o Pseudomonas Bacteremia in 3/2020  TTE recent some concern for increased valve gradient  Mitral stenosis   WBC normal    Presentation is concerning for acute Bacteremia and given AVR concern would be relapse of the previous bacterial infection either Pseudomonas or strep salivarius she had before. Blood cx in process will check Blood cx again in AM       Labs, Microbiology, Radiology and pertinent results from current hospitalization and care every where were reviewed by me as a part of the consultation. PLAN :  1. Cont IV Cipro x 400 mg x q 12 HRS AS previous isolate Pseudomonas had some resistance issues  2. Cont IV Vancomycin pending blood cx   3. ESR, CRP  4. Repeat Blood cx  5. TTE to begin with may need BRAYAN     Addendum : Blood cx from  4/16/21 now positive for Pseudomonas as suspected and will adjust IV abx    1. Add IV Meropenem x 1 gm Q 8 hrs  2. D/C IV Vancomycin   3. Given her age would avoid Aminoglycoside unless she continues to be bacteremic and await sensitivities on the Pseudomonas to see if we can add Gent/tobra  - BRAYAN becomes necessary     Discussed with patient/Family and Nursing   Risk of Complications/Morbidity: High      · Illness(es)/ Infection present that pose threat to bodily function. · There is potential for severe exacerbation of infection/side effects of treatment. Therapy requires intensive monitoring for antimicrobial agent toxicity. Thanks for allowing me to participate in your patient's care please call me with any questions or concerns.     Dr. Kvng Neves MD  [address] [place] [address] Physician  Phone: [phone]   Fax : [phone]

## 2021-04-17 NOTE — PROGRESS NOTES
Messaged MD that pt has spiked a 102.1 fever and tylenol not due until 2100. Will provide ice packs.

## 2021-04-17 NOTE — PROGRESS NOTES
Pt called nurse into room as they were shivering uncontrollably. Pt states these episodes occur sometimes, but usually not in the hospital.  Pt did not feel warm to the touch. Oral temp was 97.4. Wasn't sure if she had drank anything so took axillary temperature. Temperature was 98.4. Pt tachycardic with , RR 44, /81. Messaged MD. Tylenol given and pt placed on non-rebreather 5L per md request.  MD stated they would come assess the pt shortly.

## 2021-04-17 NOTE — PLAN OF CARE
Problem: Gas Exchange - Impaired:  Goal: Levels of oxygenation will improve  Description: Levels of oxygenation will improve  Outcome: Ongoing   In the afternoon. Pt stated that she felt she could not breath. Pt was shivering uncontrollably, but afebrile, with , RR 44. Pt put on non-rebreather temporarily per MD request.  30 minutes after tylenol, pt began to relax, RR and HR decreased. Problem: Infection, Septic Shock:  Goal: Will show no infection signs and symptoms  Description: Will show no infection signs and symptoms  Outcome: Not Met This Shift   In the afternoon. Pt stated that she felt she could not breath. Pt was shivering uncontrollably, but afebrile, with , RR 44. Pt put on non-rebreather temporarily per MD request.  30 minutes after tylenol, pt began to relax, RR and HR decreased.

## 2021-04-17 NOTE — PROGRESS NOTES
Progress Note    Admit Date: 4/16/2021  Diet: DIET GENERAL;    CC: fever/chills    Interval history: No change since admission. Low grade fever responsive to tylenol. Patient feeling winded after walking to bathroom. HPI: Mario Alberto Miller is an 80 y.o. female with history of aortic valve replacement with bioprosthetic valve (2013), moderate aortic insufficiency, GERD, MI in 2007, hypertension, and previous history of pseudomonas bacteremia (3/2020) and previously strep (5/2019) who presents from home with fever and chills of 3 day duration. The patient was working outside in the yard this morning and when she went inside she noticed that she began chilling and then developed rigors and felt that she needed to lay down in bed to cover herself up to keep warm. She reported subjective fevers at home and stated she felt similar to how she felt when she had her prior bloodstream infections. She called 911 to have her brought to the hospital because she was still feeling the rigors. She states that she has had bloodstream infections in the past but that a source was never identified. She is not complaining of any chest pain or shortness of breath, has no abdominal pain, dysuria, or pain anywhere or headache neck pain or skin infections.  She did have her Covid vaccination series, last one March 11.      In the ED she was given a dose of vancomycin and cefepime. Her prior cultures were reviewed and she grew Streptococcus salivarius in 2019 and Pseudomonas aeruginosa in 2020 (resistant to aztreonam, cefepime, ceftazidime, and zosyn, sensitive to ciprofloxacin and merrem). Temperature on admission was 99.4, however she had taken tylenol at home prior.  \" - taken from admission H&P    Medications:     Scheduled Meds:   insulin lispro  0-6 Units Subcutaneous TID WC    insulin lispro  0-3 Units Subcutaneous Nightly    vancomycin  1,000 mg Intravenous Q12H    aspirin  81 mg Oral Daily    amLODIPine  2.5 mg Oral Daily    magnesium oxide  400 mg Oral Daily    pravastatin  40 mg Oral Daily    sodium chloride flush  5-40 mL Intravenous 2 times per day    enoxaparin  40 mg Subcutaneous Daily    ciprofloxacin  400 mg Intravenous Q12H     Continuous Infusions:   dextrose      sodium chloride 100 mL/hr at 04/17/21 0912    sodium chloride       PRN Meds:glucose, dextrose, glucagon (rDNA), dextrose, pantoprazole, sodium chloride flush, sodium chloride, promethazine **OR** ondansetron, polyethylene glycol, acetaminophen **OR** acetaminophen, perflutren lipid microspheres, benzonatate, dextromethorphan-guaiFENesin    Objective:   Vitals:   T-max:  Patient Vitals for the past 8 hrs:   BP Temp Temp src Pulse Resp SpO2   04/17/21 0859 (!) 120/35 99.4 °F (37.4 °C) Oral 94 16 96 %   04/17/21 0347 (!) 140/33 99.1 °F (37.3 °C) Oral 72 14 97 %       Intake/Output Summary (Last 24 hours) at 4/17/2021 0946  Last data filed at 4/17/2021 0604  Gross per 24 hour   Intake 240 ml   Output 700 ml   Net -460 ml       Review of Systems  13 point ROS negative except as stated above  Physical Exam  General appearance:  No apparent distress, appears stated age and cooperative. HEENT:  Normal cephalic,atraumatic without obvious deformity. Pupils equal, round, and reactive to light. Extra ocular muscles intact. Conjunctivae/corneas clear. Edentulous. No caries noted. Neck: Supple, with full range of motion. No jugular venous distention. Trachea midline. Respiratory:  Normal respiratory effort. Clear to auscultation, bilaterally without Rales/Wheezes/Rhonchi. Cardiovascular:  Regular rate and rhythm with normal S1/S2. Systolic murmur heard most prominently at right sternal border  Abdomen: Soft, non-tender, non-distended with normal bowel sounds. Musculoskeletal:  No clubbing, cyanosis or edema bilaterally. Full range of motion without deformity. No septic or swollen appearing joints. Skin: Skin color, texture, turgor normal.  No rashes or lesions.  No boils, pimples or sores. Neurologic:  Neurovascularly intact without any focal sensory/motor deficits. Cranialnerves: II-XII intact, grossly non-focal.  Psychiatric:  Alert and oriented, thought content appropriate,normal insight  Capillary Refill: Brisk,< 3 seconds   Peripheral Pulses: +2 palpable, equal bilaterally   LABS:    CBC:   Recent Labs     04/16/21  1603 04/17/21  0400   WBC 8.6 9.0   HGB 14.5 14.0   HCT 41.7 41.2   PLT 69* 61*   MCV 93.3 94.6     Renal:    Recent Labs     04/16/21  1603 04/17/21  0400   * 140   K 3.7 4.1    108   CO2 22 20*   BUN 17 15   CREATININE 0.5* <0.5*   GLUCOSE 165* 124*   CALCIUM 9.4 9.0   ANIONGAP 12 12     -----------------------------------------------------------------  RAD:   XR CHEST (2 VW)   Final Result      No acute cardiopulmonary disease identified. Assessment/Plan:   Sepsis 2/2 likely bacteremia, source uncertain  -Tmax 25.8 (after tylenol), lactate 2.3, RR 20, elevated procal 0.85  -s/p sepsis protocol fluids  -UA and CXR negative for infection   -f/u blood cultures (2 sets of 2)  -f/u lactate to resolution  -Her prior cultures were reviewed and she grew Streptococcus salivarius in 2019 and Pseudomonas aeruginosa in 2020 (resistant to aztreonam, cefepime, ceftazidime, and zosyn, sensitive to ciprofloxacin and merrem).    -received dose of vanc and cefepime in ED  -continued on vancomycin and cipro 400 mg IV BID  -appreciate infectious disease recommendations  -f/u repeat ECHO, even if negative, may still require BRAYAN for further evaluation of the bioprosthetic valve for vegetations, dehiscence or worsening of aortic insufficiency  -continue maintenance fluids @ 100 cc/hr  -f/u COVID swab and MRSA nares     HTN  -continue home norvasc 2.5 mg PO qD     HLD  -continue home pravastatin 40 mg PO qD    Code Status: Limited  FEN: General diet  PPX: Lovenox, ABIGAILs  DISPO: [facility]    Discussed with Dr. Morales Gonzalez MD, PGY-2  04/17/21  9:46 AM This patient has been staffed and discussed with Selene Qiu MD.     Addendum to Resident H& P/Progress note:  I have personally seen,examined and evaluated the patient.  I have reviewed the current history, physical findings, labs and assessment and plan and agree with note as documented by resident MD ( Sanaz Gore)      Selene Qiu MD, Duane Cleaves

## 2021-04-18 LAB
ANION GAP SERPL CALCULATED.3IONS-SCNC: 10 MMOL/L (ref 3–16)
BASOPHILS ABSOLUTE: 0 K/UL (ref 0–0.2)
BASOPHILS RELATIVE PERCENT: 0.6 %
BUN BLDV-MCNC: 17 MG/DL (ref 7–20)
C-REACTIVE PROTEIN: 46.3 MG/L (ref 0–5.1)
CALCIUM SERPL-MCNC: 7.6 MG/DL (ref 8.3–10.6)
CHLORIDE BLD-SCNC: 107 MMOL/L (ref 99–110)
CO2: 17 MMOL/L (ref 21–32)
CREAT SERPL-MCNC: 0.8 MG/DL (ref 0.6–1.2)
EOSINOPHILS ABSOLUTE: 0 K/UL (ref 0–0.6)
EOSINOPHILS RELATIVE PERCENT: 0.4 %
GFR AFRICAN AMERICAN: >60
GFR NON-AFRICAN AMERICAN: >60
GLUCOSE BLD-MCNC: 113 MG/DL (ref 70–99)
GLUCOSE BLD-MCNC: 143 MG/DL (ref 70–99)
GLUCOSE BLD-MCNC: 155 MG/DL (ref 70–99)
GLUCOSE BLD-MCNC: 184 MG/DL (ref 70–99)
GLUCOSE BLD-MCNC: 190 MG/DL (ref 70–99)
HCT VFR BLD CALC: 36.8 % (ref 36–48)
HEMOGLOBIN: 12.6 G/DL (ref 12–16)
LYMPHOCYTES ABSOLUTE: 0.6 K/UL (ref 1–5.1)
LYMPHOCYTES RELATIVE PERCENT: 9.9 %
MCH RBC QN AUTO: 32.4 PG (ref 26–34)
MCHC RBC AUTO-ENTMCNC: 34.1 G/DL (ref 31–36)
MCV RBC AUTO: 94.9 FL (ref 80–100)
MONOCYTES ABSOLUTE: 0.6 K/UL (ref 0–1.3)
MONOCYTES RELATIVE PERCENT: 9.7 %
NEUTROPHILS ABSOLUTE: 5.1 K/UL (ref 1.7–7.7)
NEUTROPHILS RELATIVE PERCENT: 79.4 %
PDW BLD-RTO: 14.4 % (ref 12.4–15.4)
PERFORMED ON: ABNORMAL
PLATELET # BLD: 47 K/UL (ref 135–450)
PMV BLD AUTO: 9.8 FL (ref 5–10.5)
POTASSIUM REFLEX MAGNESIUM: 4.8 MMOL/L (ref 3.5–5.1)
PROCALCITONIN: 10.91 NG/ML (ref 0–0.15)
RBC # BLD: 3.88 M/UL (ref 4–5.2)
SEDIMENTATION RATE, ERYTHROCYTE: 23 MM/HR (ref 0–30)
SODIUM BLD-SCNC: 134 MMOL/L (ref 136–145)
VANCOMYCIN TROUGH: 5.3 UG/ML (ref 10–20)
WBC # BLD: 6.5 K/UL (ref 4–11)

## 2021-04-18 PROCEDURE — 6360000002 HC RX W HCPCS: Performed by: STUDENT IN AN ORGANIZED HEALTH CARE EDUCATION/TRAINING PROGRAM

## 2021-04-18 PROCEDURE — 84145 PROCALCITONIN (PCT): CPT

## 2021-04-18 PROCEDURE — 2580000003 HC RX 258: Performed by: INTERNAL MEDICINE

## 2021-04-18 PROCEDURE — 6370000000 HC RX 637 (ALT 250 FOR IP): Performed by: STUDENT IN AN ORGANIZED HEALTH CARE EDUCATION/TRAINING PROGRAM

## 2021-04-18 PROCEDURE — 36415 COLL VENOUS BLD VENIPUNCTURE: CPT

## 2021-04-18 PROCEDURE — 85652 RBC SED RATE AUTOMATED: CPT

## 2021-04-18 PROCEDURE — 85025 COMPLETE CBC W/AUTO DIFF WBC: CPT

## 2021-04-18 PROCEDURE — 2060000000 HC ICU INTERMEDIATE R&B

## 2021-04-18 PROCEDURE — 86140 C-REACTIVE PROTEIN: CPT

## 2021-04-18 PROCEDURE — 99232 SBSQ HOSP IP/OBS MODERATE 35: CPT | Performed by: HOSPITALIST

## 2021-04-18 PROCEDURE — 2580000003 HC RX 258: Performed by: STUDENT IN AN ORGANIZED HEALTH CARE EDUCATION/TRAINING PROGRAM

## 2021-04-18 PROCEDURE — 6360000002 HC RX W HCPCS: Performed by: INTERNAL MEDICINE

## 2021-04-18 PROCEDURE — 87040 BLOOD CULTURE FOR BACTERIA: CPT

## 2021-04-18 PROCEDURE — 80202 ASSAY OF VANCOMYCIN: CPT

## 2021-04-18 PROCEDURE — 80048 BASIC METABOLIC PNL TOTAL CA: CPT

## 2021-04-18 RX ADMIN — MAGNESIUM OXIDE TAB 400 MG (240 MG ELEMENTAL MG) 400 MG: 400 (240 MG) TAB at 08:54

## 2021-04-18 RX ADMIN — PRAVASTATIN SODIUM 40 MG: 40 TABLET ORAL at 08:54

## 2021-04-18 RX ADMIN — INSULIN LISPRO 1 UNITS: 100 INJECTION, SOLUTION INTRAVENOUS; SUBCUTANEOUS at 12:22

## 2021-04-18 RX ADMIN — INSULIN LISPRO 1 UNITS: 100 INJECTION, SOLUTION INTRAVENOUS; SUBCUTANEOUS at 18:07

## 2021-04-18 RX ADMIN — BENZONATATE 100 MG: 100 CAPSULE ORAL at 21:29

## 2021-04-18 RX ADMIN — SODIUM CHLORIDE: 9 INJECTION, SOLUTION INTRAVENOUS at 02:24

## 2021-04-18 RX ADMIN — AMLODIPINE BESYLATE 2.5 MG: 2.5 TABLET ORAL at 08:54

## 2021-04-18 RX ADMIN — PANTOPRAZOLE SODIUM 40 MG: 40 TABLET, DELAYED RELEASE ORAL at 20:25

## 2021-04-18 RX ADMIN — ASPIRIN 81 MG: 81 TABLET, COATED ORAL at 08:54

## 2021-04-18 RX ADMIN — MEROPENEM 1000 MG: 1 INJECTION, POWDER, FOR SOLUTION INTRAVENOUS at 18:01

## 2021-04-18 RX ADMIN — CIPROFLOXACIN 400 MG: 2 INJECTION, SOLUTION INTRAVENOUS at 01:29

## 2021-04-18 RX ADMIN — MEROPENEM 1000 MG: 1 INJECTION, POWDER, FOR SOLUTION INTRAVENOUS at 08:54

## 2021-04-18 RX ADMIN — SODIUM CHLORIDE: 9 INJECTION, SOLUTION INTRAVENOUS at 21:13

## 2021-04-18 RX ADMIN — MEROPENEM 1000 MG: 1 INJECTION, POWDER, FOR SOLUTION INTRAVENOUS at 01:23

## 2021-04-18 RX ADMIN — CIPROFLOXACIN 400 MG: 2 INJECTION, SOLUTION INTRAVENOUS at 12:15

## 2021-04-18 RX ADMIN — ACETAMINOPHEN 650 MG: 325 TABLET ORAL at 21:12

## 2021-04-18 ASSESSMENT — PAIN SCALES - GENERAL
PAINLEVEL_OUTOF10: 0

## 2021-04-18 NOTE — PLAN OF CARE
Problem: Gas Exchange - Impaired:  Goal: Levels of oxygenation will improve  Description: Levels of oxygenation will improve  4/18/2021 1648 by Rikki Torres RN  Outcome: Ongoing  Note: During this shift, pt's oxygen saturation has remained greater than 90% on room air. Pt exhibits dyspnea with exertion. No respiratory distress noted. Will continue to monitor and will update MD as needed with change in pt status. Electronically signed by Rikki Torres RN on 4/18/2021 at 4:44 PM       Problem: Infection, Septic Shock:  Goal: Will show no infection signs and symptoms  Description: Will show no infection signs and symptoms  4/18/2021 1648 by Rikki Torrse RN  Outcome: Ongoing  Note: Pt was admitted 4/16 with sepsis 2/2 pseudomonas bacteremia. Pt has remained afebrile during this shift. Pt is receiving IV ABX per MD order for treatment of infection. Pt to have BRAYAN tomorrow to assess for possible endocarditis. Will continue with current regimen as ordered. Electronically signed by Rikki Torres RN on 4/18/2021 at 4:49 PM       Problem: Serum Glucose Level - Abnormal:  Goal: Ability to maintain appropriate glucose levels will improve  Description: Ability to maintain appropriate glucose levels will improve  Outcome: Ongoing  Note: Blood glucose levels are being monitor ACHS as ordered by MD. Sliding scale humalog is being administered as ordered (see MAR). Electronically signed by Rikki Torres RN on 4/18/2021 at 4:50 PM       Problem: Falls - Risk of:  Goal: Will remain free from falls  Description: Will remain free from falls  4/18/2021 1648 by Rikki Torres RN  Outcome: Ongoing     Problem: Pain:  Goal: Control of acute pain  Description: Control of acute pain  Note: Pt has denied pain during this shift. Will continue to assess and administer pain medication as needed.  Electronically signed by Rikki Torres RN on 4/18/2021 at 4:37 PM

## 2021-04-18 NOTE — PLAN OF CARE
Problem: Infection, Septic Shock:  Goal: Will show no infection signs and symptoms  Description: Will show no infection signs and symptoms  Outcome: Ongoing    Preliminary result on blood cultures positive for pseudomonas aeruginosa; sensitivity to follow; vanc d/c'd; pt receiving IV cipro/merrem. Problem: Tissue Perfusion, Altered:  Goal: Circulatory function within specified parameters  Description: Circulatory function within specified parameters  Outcome: Ongoing    Pt asymptomatically hypotensive at start of shift, MD made aware and half liter bolus ordered and started. Problem: Gas Exchange - Impaired:  Goal: Levels of oxygenation will improve  Description: Levels of oxygenation will improve  Outcome: Ongoing  Sp02 in the high 80s low 90s; placed on 2L O2 and Sp02 maintained greater than 92% throughout shift. Problem: Pain:  Goal: Pain level will decrease  Description: Pain level will decrease  Outcome: Met This Shift    Son updated on pt status and plan of care x2 throughout shift.

## 2021-04-18 NOTE — PROGRESS NOTES
Progress Note    Admit Date: 4/16/2021  Diet: DIET GENERAL;    CC: fever/chills    Interval history: Patient spiked a fever of 102.1 overnight despite scheduled tylenol. Patient also exhibited rigors. Fungal cultures were ordered at that time. Patient's BP dipped down to 19B systolic, which improved to SBP 90s with 500mL bolus of IV fluids. Patient reports feeling much better this AM.    HPI: Hal Miller is an 80 y.o. female with history of aortic valve replacement with bioprosthetic valve (2013), moderate aortic insufficiency, GERD, MI in 2007, hypertension, and previous history of pseudomonas bacteremia (3/2020) and previously strep (5/2019) who presents from home with fever and chills of 3 day duration. The patient was working outside in the yard this morning and when she went inside she noticed that she began chilling and then developed rigors and felt that she needed to lay down in bed to cover herself up to keep warm. She reported subjective fevers at home and stated she felt similar to how she felt when she had her prior bloodstream infections. She called 911 to have her brought to the hospital because she was still feeling the rigors. She states that she has had bloodstream infections in the past but that a source was never identified. She is not complaining of any chest pain or shortness of breath, has no abdominal pain, dysuria, or pain anywhere or headache neck pain or skin infections.  She did have her Covid vaccination series, last one March 11.      In the ED she was given a dose of vancomycin and cefepime. Her prior cultures were reviewed and she grew Streptococcus salivarius in 2019 and Pseudomonas aeruginosa in 2020 (resistant to aztreonam, cefepime, ceftazidime, and zosyn, sensitive to ciprofloxacin and merrem). Temperature on admission was 99.4, however she had taken tylenol at home prior.  \" - taken from admission H&P    Medications:     Scheduled Meds:   meropenem  1,000 mg Intravenous Q8H  insulin lispro  0-6 Units Subcutaneous TID     insulin lispro  0-3 Units Subcutaneous Nightly    aspirin  81 mg Oral Daily    amLODIPine  2.5 mg Oral Daily    magnesium oxide  400 mg Oral Daily    pravastatin  40 mg Oral Daily    sodium chloride flush  5-40 mL Intravenous 2 times per day    [Held by provider] enoxaparin  40 mg Subcutaneous Daily    ciprofloxacin  400 mg Intravenous Q12H     Continuous Infusions:   IV infusion builder      dextrose      sodium chloride 100 mL/hr at 04/18/21 0224    sodium chloride       PRN Meds:glucose, dextrose, glucagon (rDNA), dextrose, pantoprazole, sodium chloride flush, sodium chloride, promethazine **OR** ondansetron, polyethylene glycol, acetaminophen **OR** acetaminophen, perflutren lipid microspheres, benzonatate, dextromethorphan-guaiFENesin    Objective:   Vitals:   T-max:  Patient Vitals for the past 8 hrs:   BP Temp Temp src Pulse Resp SpO2 Weight   04/18/21 0852 (!) 133/49 96 °F (35.6 °C) Oral 68 17 95 %    04/18/21 0600       166 lb 1.6 oz (75.3 kg)   04/18/21 0300 (!) 116/49 96.9 °F (36.1 °C) Oral 63 14 92 %        Intake/Output Summary (Last 24 hours) at 4/18/2021 0934  Last data filed at 4/18/2021 0725  Gross per 24 hour   Intake 3188 ml   Output 1000 ml   Net 2188 ml       Review of Systems  13 point ROS negative except as stated above    Physical Exam  General appearance:  No apparent distress, appears stated age and cooperative. HEENT:  Normal cephalic,atraumatic without obvious deformity. Pupils equal, round, and reactive to light. Extra ocular muscles intact. Conjunctivae/corneas clear. No caries noted. Neck: Supple, with full range of motion. No jugular venous distention. Trachea midline. Respiratory:  Normal respiratory effort. Clear to auscultation, bilaterally without Rales/Wheezes/Rhonchi. Cardiovascular:  Regular rate and rhythm with normal S1/S2.  Systolic murmur heard most prominently at right sternal border  Abdomen: Soft, non-tender, non-distended with normal bowel sounds. Musculoskeletal:  No clubbing, cyanosis or edema bilaterally. Full range of motion without deformity. No septic or swollen appearing joints. Skin: Skin color, texture, turgor normal.  No rashes or lesions. No boils, pimples or sores. Neurologic:  Neurovascularly intact without any focal sensory/motor deficits. Cranialnerves: II-XII intact, grossly non-focal.  Psychiatric:  Alert and oriented, thought content appropriate,normal insight  Capillary Refill: Brisk,< 3 seconds   Peripheral Pulses: +2 palpable, equal bilaterally   LABS:    CBC:   Recent Labs     04/16/21  1603 04/17/21  0400 04/18/21  0230   WBC 8.6 9.0 6.5   HGB 14.5 14.0 12.6   HCT 41.7 41.2 36.8   PLT 69* 61* 47*   MCV 93.3 94.6 94.9     Renal:    Recent Labs     04/16/21  1603 04/17/21  0400 04/18/21  0230   * 140 134*   K 3.7 4.1 4.8    108 107   CO2 22 20* 17*   BUN 17 15 17   CREATININE 0.5* <0.5* 0.8   GLUCOSE 165* 124* 190*   CALCIUM 9.4 9.0 7.6*   ANIONGAP 12 12 10     -----------------------------------------------------------------  RAD:   XR CHEST (2 VW)   Final Result      No acute cardiopulmonary disease identified. Assessment/Plan:   Sepsis 2/2 pseudomonas bacteremia, source uncertain  Blood cx from  4/16/21 now positive for Pseudomonas. Prior cultures were reviewed and she grew Streptococcus salivarius in 2019 and Pseudomonas aeruginosa in 2020 (resistant to aztreonam, cefepime, ceftazidime, and zosyn, sensitive to ciprofloxacin and merrem). S/p sepsis protocol fluids. UA and CXR negative for infection. Tmax 99.4 (after tylenol), lactate 2.3, RR 20, elevated procal 0.85 on admission. COVID -[place].   - f/u blood cultures (2 sets of 2), fungal cx, MRSA swab  - consult cards  - f/u BRAYAN  - on cipro 400 mg IV BID + IV Meropenem x 1 gm Q 8 hrs  - ID consulted - appreciate recs  - Per ID - Avoid Aminoglycoside unless she continues to be bacteremic and await sensitivities on the Pseudomonas to see if we can add Gent/tobra    - continue maintenance fluids @ 100 cc/hr     NAGMA  - bicarb gtt 150mEq @100  - monitor     HTN  -continue home norvasc 2.5 mg PO qD     HLD  -continue home pravastatin 40 mg PO qD    Code Status: Limited  FEN: General diet  PPX: Lovenox, SCDs  DISPO: [facility]      Tha Fernando MD, PGY-1  04/18/21  9:34 AM    This patient has been staffed and discussed with Luke Tomlinson MD.     Addendum to Resident H& P/Progress note:  I have personally seen,examined and evaluated the patient.  I have reviewed the current history, physical findings, labs and assessment and plan and agree with note as documented by resident MD ( Cornelia Springer)      Luke Tomlinson MD, FACP

## 2021-04-18 NOTE — PROGRESS NOTES
Physician Progress Note      Brendan Fermin  CSN #:                  [id number]  :                       1935  ADMIT DATE:       2021 3:37 PM  [address] [place] DATE:  RESPONDING  PROVIDER #:        Peace Chamberlain MD          QUERY TEXT:    Dear Provider,    Patient admitted with fever. Noted  H & P documentation of sepsis. In order to   support the diagnosis of sepsis, please include additional clinical   indicators in your documentation. Or please document if the diagnosis of   sepsis has been ruled out after further study    The medical record reflects the following:  Risk Factors: Hx bloodstream infections, aortic valve replacement with   bioprosthetic valve  Clinical Indicators: Subjective fever at home, chills and rigors. Took   Tylenol. T max  99.4. Procalcitonin 0.85 , LA 1.9-2.5 ,Blood cultures   pending. UA negative, CXR negative  Treatment: Infectious work up, NS bolus 30ml/kg, Cefepime, Cipro, Vancomycin  Options provided:  -- Sepsis present as evidenced by, Please document evidence.   -- Sepsis was ruled out after study  -- Other - I will add my own diagnosis  -- Disagree - Not applicable / Not valid  -- Disagree - Clinically unable to determine / Unknown  -- Refer to Clinical Documentation Reviewer    PROVIDER RESPONSE TEXT:    Sepsis is present as evidenced by episodes of fever up to 102.1 and positive   blood cultures for Ps aeruginosa    Query created by: Amber Rico on 2021 12:48 PM      Electronically signed by:  Peace Chamberlain MD 2021 10:16 AM

## 2021-04-19 LAB
ANION GAP SERPL CALCULATED.3IONS-SCNC: 6 MMOL/L (ref 3–16)
BASOPHILS ABSOLUTE: 0.1 K/UL (ref 0–0.2)
BASOPHILS RELATIVE PERCENT: 0.8 %
BUN BLDV-MCNC: 13 MG/DL (ref 7–20)
CALCIUM SERPL-MCNC: 7.4 MG/DL (ref 8.3–10.6)
CHLORIDE BLD-SCNC: 106 MMOL/L (ref 99–110)
CO2: 23 MMOL/L (ref 21–32)
CREAT SERPL-MCNC: 0.6 MG/DL (ref 0.6–1.2)
CULTURE, BLOOD 2: ABNORMAL
CULTURE, BLOOD 2: ABNORMAL
EOSINOPHILS ABSOLUTE: 0.3 K/UL (ref 0–0.6)
EOSINOPHILS RELATIVE PERCENT: 4.2 %
GFR AFRICAN AMERICAN: >60
GFR NON-AFRICAN AMERICAN: >60
GLUCOSE BLD-MCNC: 102 MG/DL (ref 70–99)
GLUCOSE BLD-MCNC: 121 MG/DL (ref 70–99)
GLUCOSE BLD-MCNC: 124 MG/DL (ref 70–99)
GLUCOSE BLD-MCNC: 126 MG/DL (ref 70–99)
GLUCOSE BLD-MCNC: 98 MG/DL (ref 70–99)
HCT VFR BLD CALC: 36.5 % (ref 36–48)
HEMOGLOBIN: 12.5 G/DL (ref 12–16)
LV EF: 53 %
LVEF MODALITY: NORMAL
LYMPHOCYTES ABSOLUTE: 1 K/UL (ref 1–5.1)
LYMPHOCYTES RELATIVE PERCENT: 16.3 %
MCH RBC QN AUTO: 32.6 PG (ref 26–34)
MCHC RBC AUTO-ENTMCNC: 34.4 G/DL (ref 31–36)
MCV RBC AUTO: 95 FL (ref 80–100)
MONOCYTES ABSOLUTE: 0.8 K/UL (ref 0–1.3)
MONOCYTES RELATIVE PERCENT: 12.3 %
NEUTROPHILS ABSOLUTE: 4.3 K/UL (ref 1.7–7.7)
NEUTROPHILS RELATIVE PERCENT: 66.4 %
ORGANISM: ABNORMAL
ORGANISM: ABNORMAL
PDW BLD-RTO: 14.3 % (ref 12.4–15.4)
PERFORMED ON: ABNORMAL
PERFORMED ON: NORMAL
PLATELET # BLD: 45 K/UL (ref 135–450)
PMV BLD AUTO: 10 FL (ref 5–10.5)
POTASSIUM REFLEX MAGNESIUM: 4 MMOL/L (ref 3.5–5.1)
RBC # BLD: 3.84 M/UL (ref 4–5.2)
SODIUM BLD-SCNC: 135 MMOL/L (ref 136–145)
WBC # BLD: 6.4 K/UL (ref 4–11)

## 2021-04-19 PROCEDURE — 2580000003 HC RX 258: Performed by: STUDENT IN AN ORGANIZED HEALTH CARE EDUCATION/TRAINING PROGRAM

## 2021-04-19 PROCEDURE — 97161 PT EVAL LOW COMPLEX 20 MIN: CPT

## 2021-04-19 PROCEDURE — 99232 SBSQ HOSP IP/OBS MODERATE 35: CPT | Performed by: HOSPITALIST

## 2021-04-19 PROCEDURE — 93306 TTE W/DOPPLER COMPLETE: CPT

## 2021-04-19 PROCEDURE — 6360000002 HC RX W HCPCS: Performed by: STUDENT IN AN ORGANIZED HEALTH CARE EDUCATION/TRAINING PROGRAM

## 2021-04-19 PROCEDURE — 85025 COMPLETE CBC W/AUTO DIFF WBC: CPT

## 2021-04-19 PROCEDURE — 99232 SBSQ HOSP IP/OBS MODERATE 35: CPT | Performed by: INTERNAL MEDICINE

## 2021-04-19 PROCEDURE — 36415 COLL VENOUS BLD VENIPUNCTURE: CPT

## 2021-04-19 PROCEDURE — 97530 THERAPEUTIC ACTIVITIES: CPT

## 2021-04-19 PROCEDURE — 2060000000 HC ICU INTERMEDIATE R&B

## 2021-04-19 PROCEDURE — 2580000003 HC RX 258: Performed by: INTERNAL MEDICINE

## 2021-04-19 PROCEDURE — 6360000002 HC RX W HCPCS: Performed by: INTERNAL MEDICINE

## 2021-04-19 PROCEDURE — 97116 GAIT TRAINING THERAPY: CPT

## 2021-04-19 PROCEDURE — 99223 1ST HOSP IP/OBS HIGH 75: CPT | Performed by: INTERNAL MEDICINE

## 2021-04-19 PROCEDURE — 80048 BASIC METABOLIC PNL TOTAL CA: CPT

## 2021-04-19 PROCEDURE — 97165 OT EVAL LOW COMPLEX 30 MIN: CPT

## 2021-04-19 PROCEDURE — 6370000000 HC RX 637 (ALT 250 FOR IP): Performed by: STUDENT IN AN ORGANIZED HEALTH CARE EDUCATION/TRAINING PROGRAM

## 2021-04-19 RX ORDER — ALBUTEROL SULFATE 2.5 MG/3ML
2.5 SOLUTION RESPIRATORY (INHALATION) EVERY 6 HOURS PRN
Status: DISCONTINUED | OUTPATIENT
Start: 2021-04-19 | End: 2021-04-23 | Stop reason: HOSPADM

## 2021-04-19 RX ADMIN — MAGNESIUM OXIDE TAB 400 MG (240 MG ELEMENTAL MG) 400 MG: 400 (240 MG) TAB at 08:35

## 2021-04-19 RX ADMIN — CIPROFLOXACIN 400 MG: 2 INJECTION, SOLUTION INTRAVENOUS at 12:36

## 2021-04-19 RX ADMIN — MEROPENEM 1000 MG: 1 INJECTION, POWDER, FOR SOLUTION INTRAVENOUS at 01:59

## 2021-04-19 RX ADMIN — CIPROFLOXACIN 400 MG: 2 INJECTION, SOLUTION INTRAVENOUS at 00:18

## 2021-04-19 RX ADMIN — SODIUM CHLORIDE: 9 INJECTION, SOLUTION INTRAVENOUS at 18:28

## 2021-04-19 RX ADMIN — Medication 10 ML: at 08:36

## 2021-04-19 RX ADMIN — MEROPENEM 1000 MG: 1 INJECTION, POWDER, FOR SOLUTION INTRAVENOUS at 16:34

## 2021-04-19 RX ADMIN — ASPIRIN 81 MG: 81 TABLET, COATED ORAL at 08:35

## 2021-04-19 RX ADMIN — GUAIFENESIN AND DEXTROMETHORPHAN HYDROBROMIDE 1 TABLET: 600; 30 TABLET, EXTENDED RELEASE ORAL at 21:38

## 2021-04-19 RX ADMIN — BENZONATATE 100 MG: 100 CAPSULE ORAL at 16:35

## 2021-04-19 RX ADMIN — PRAVASTATIN SODIUM 40 MG: 40 TABLET ORAL at 08:35

## 2021-04-19 RX ADMIN — AMLODIPINE BESYLATE 2.5 MG: 2.5 TABLET ORAL at 08:35

## 2021-04-19 RX ADMIN — MEROPENEM 1000 MG: 1 INJECTION, POWDER, FOR SOLUTION INTRAVENOUS at 08:35

## 2021-04-19 ASSESSMENT — PAIN SCALES - GENERAL: PAINLEVEL_OUTOF10: 0

## 2021-04-19 NOTE — PROGRESS NOTES
Occupational Therapy   Occupational Therapy Initial Assessment, Tx, D/C  Date: 2021   Patient Name: Dennis Keys  MRN: [id number]     : 1935    Date of Service: 2021    Discharge Recommendations: Dennis Keys scored a 21/24 on the AM-PAC ADL Inpatient form. Current research shows that an AM-PAC score of 18 or greater is typically associated with a discharge to the patient's home setting. Based on the patient's AM-PAC score, and their current ADL deficits, it is recommended that the patient have 2-3 sessions per week of Occupational Therapy at d/c to increase the patient's independence. At this time, this patient demonstrates the endurance and safety to discharge home with (home vs OP services) and a follow up treatment frequency of 2-3x/wk. Please see assessment section for further patient specific details. If patient discharges prior to next session this note will serve as a discharge summary. Please see below for the latest assessment towards goals. OT Equipment Recommendations  Equipment Needed: No    Assessment   Assessment: Pt from home alone, pt reporting prev ind with fxl mobility, transfers, ADL. Pt currently requires SBA to Supervision with fxl mobility, transfers, ADL. Pt with no further acute care OT needs. Pt expressing no needs or concerns to return home or therapy needs. D/C acute OT. Prognosis: Good  Decision Making: Low Complexity  OT Education: OT Role;Plan of Care;Transfer Training;ADL Adaptive Strategies; Energy Conservation  Patient Education: verb understanding  REQUIRES OT FOLLOW UP: No  Activity Tolerance  Activity Tolerance: Patient Tolerated treatment well  Activity Tolerance: pt reporting fatigue, requesting back to bed  Safety Devices  Safety Devices in place: Yes  Type of devices: All fall risk precautions in place;Gait belt;Bed alarm in place; Left in bed;Nurse notified;Call light within reach           Patient Diagnosis(es): The encounter diagnosis was Septicemia ([facility] [address] 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, Aortic stenosis, Benign neoplasm of colon, Cancer ([facility] [address] Carcinoma in situ of cervix uteri, Coronary atherosclerosis of unspecified type of vessel, native or graft, Essential hypertension, benign, History of kidney stones, Internal hemorrhoids without mention of complication, MI, old, Other and unspecified hyperlipidemia, Palpitations, Peptic ulcer, unspecified site, unspecified as acute or chronic, without mention of hemorrhage, perforation, or obstruction, Sebaceous cyst of breast, Trigger finger, 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 has a past surgical history that includes Colonoscopy (03/30/2011); back surgery (1977); Cervix removal (1964); Colonoscopy (02/23/2009); Cataract removal (5/2012); Colonoscopy (5/8/2013 ); Cystocopy ([id number]); Cardiac catheterization (6/6/2013 ); other surgical history (6/27/2013); Aortic valve replacement (7/12/2013 ); Colonoscopy (5/4/2015, 3/7/2016); Upper gastrointestinal endoscopy (3/7/2016); Upper gastrointestinal endoscopy (N/A, 1/28/2019); Colonoscopy (1/28/2019); Colonoscopy (1/28/2019); and Colonoscopy (1/28/2019).            Restrictions  Position Activity Restriction  Other position/activity restrictions: up with assist    Subjective   General  Additional Pertinent Hx: 80 y.o. female with history of aortic valve replacement with bioprosthetic valve (2013), moderate aortic insufficiency, GERD, MI in 2007, hypertension, and previous history of pseudomonas bacteremia (3/2020) and previously strep (5/2019) who presents from home with fever and chills of 3 day duration. The patient was working outside in the yard this morning and when she went inside she noticed that she began chilling and then developed rigors and felt that she needed to lay down in bed to cover herself up to keep warm. She reported subjective fevers at home and stated she felt similar to how she felt when she had her prior bloodstream infections. She called 911 to have her brought to the hospital because she was still feeling the rigors. She states that she has had bloodstream infections in the past but that a source was never identified. Referring Practitioner: Karina Fernandez MD  Diagnosis: fever and chills  Subjective  Subjective: pt in bed upon arrival, agreeable to therapy session, reporting no pain, reporting cough    Social/Functional History  Social/Functional History  Lives With: Alone  Type of Home: House  Home Layout: One level, Laundry in basement  Home Access: Stairs to enter with rails(one to enter with rail)  Bathroom Shower/Tub: Tub/Shower unit  Bathroom Toilet: Standard(sink nearby; has BSC x2 but not using)  Bathroom Equipment: Grab bars in shower, Shower chair  Home Equipment: [employer], Rolling walker(not using DME PTA)  Receives Help From: Family, Friend(s)(neighbors check on pt daily)  ADL Assistance: Independent  Homemaking Assistance: Independent  Ambulation Assistance: Independent  Transfer Assistance: Independent  Active : Yes  Occupation: Retired  Type of occupation: teacher  Additional Comments: Pt denies any recent falls. Objective        Orientation  Overall Orientation Status: Within Functional Limits     Balance  Sitting Balance: Modified independent   Standing Balance: Stand by assistance(to spvn)  Standing Balance  Time: ~5 min  Activity: to and from bathroom, mobility in room, stance at sink  Functional Mobility  Functional - Mobility Device: No device  Activity: To/from bathroom; Other  Assist Level: Stand by assistance(to spvn)  Functional Mobility Comments: SBA to spvn steady gait, no LOB, no AD  Toilet Transfers  Toilet - Technique: Ambulating  Equipment Used: Standard toilet  Toilet Transfer: Stand by assistance  ADL  Grooming: Setup;Supervision(wash face and hands)  LE Dressing: Supervision(socks)  Toileting: Stand by assistance;Supervision        Bed mobility  Supine to Sit: Modified independent  Sit to Supine: Modified independent  Scooting: Modified independent  Transfers  Sit to stand: Stand by assistance  Stand to sit: Stand by assistance     Cognition  Overall Cognitive Status: WFL                 LUE AROM (degrees)  LUE AROM : WFL  Left Hand AROM (degrees)  Left Hand AROM: WFL  RUE AROM (degrees)  RUE AROM : WFL  Right Hand AROM (degrees)  Right Hand AROM: WFL  LUE Strength  Gross LUE Strength: WFL  RUE Strength  Gross RUE Strength: WFL                     AM-PAC Score        AM-PAC Inpatient Daily Activity Raw Score: 21 (04/19/21 1432)  AM-PAC Inpatient ADL T-Scale Score : 44.27 (04/19/21 1432)  ADL Inpatient CMS 0-100% Score: 32.79 (04/19/21 1432)  ADL Inpatient CMS G-Code Modifier : CJ (04/19/21 1432)      Therapy Time   Individual Concurrent Group Co-treatment   Time In 1403         Time Out 1426         Minutes 23              Timed Code Treatment Minutes:   13    Total Treatment Minutes:  [address] [facility], [facility], OTR/L

## 2021-04-19 NOTE — PLAN OF CARE
Problem: Pain:  Goal: Pain level will decrease  Description: Pain level will decrease  4/19/2021 0934 by Yue Hale RN  Note: No reports of pain so far, will cont to monitor     Problem: Infection, Septic Shock:  Goal: Will show no infection signs and symptoms  Description: Will show no infection signs and symptoms  Note: Pt afebrile so far this shift. No s/sx of infection noted. IV antibiotics ordered (see MAR for details). Will cont to monitor     Problem: Falls - Risk of:  Goal: Will remain free from falls  Description: Will remain free from falls  4/19/2021 0934 by Yue Hale RN  Note: Pt is a Fall Risk. See [employer] Fall Risk Score. Pt bed in low position and side rails up. Call light and belongings in reach. Pt encouraged to call for assistance. Will continue with hourly rounds for PO intake, pain needs, toileting, and repositioning as needed.

## 2021-04-19 NOTE — PROGRESS NOTES
Patient resting in bed, temp 100.6, thick non-productive cough, patient assisted to bathroom and back to bed, sodium bicarb infusion complete, patient switched back to normal saline infusion, tylenol given for mild fever, tessalon for cough, assisted to position of comfort, patient encouraged to call with needs, call light in reach, items within reach, will continue to monitor

## 2021-04-19 NOTE — CONSULTS
[facility]  Cardiology Inpatient Consult Service                                                                                          Pt Name: Brittani Cristina  Age: 80 y.o. Sex: female  : 1935  Location: [address] [place] [address]    Referring Physician: Isis Matthews MD    Reason for Consult:     Reason for Consultation/Chief Complaint: Fever, Chills, Fatigue       HPI:      Brittani Cristina is a 80 y.o. female with a past medical history of Hypertension, Type II diabetes, aortic valve replacement with bioprosthetic valve (), pseudomonas bacteremia who presented to the hospital with fever and Chills. The patient was working in the back yard when she noticed onset of chills and rigors. She reported subjective fevers at home and stated she felt similar to how she felt when she had her prior bloodstream infections. In the ED she was given a dose of vancomycin and cefepime. Blood cultures have since been positive for Pseudomonas. She denies chest pain, chest pressure/discomfort, claudication, exertional chest pressure/discomfort, irregular heart beat, lower extremity edema, orthopnea and syncope.       Histories     Past Medical History:   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, Aortic stenosis, Benign neoplasm of colon, Cancer ([facility] [address] Carcinoma in situ of cervix uteri, Coronary atherosclerosis of unspecified type of vessel, native or graft, Essential hypertension, benign, History of kidney stones, Internal hemorrhoids without mention of complication, MI, old, Other and unspecified hyperlipidemia, Palpitations, Peptic ulcer, unspecified site, unspecified as acute or chronic, without mention of hemorrhage, perforation, or obstruction, Sebaceous cyst of breast, Trigger finger, 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 Yes Historical Provider, MD   blood glucose monitor strips Test 1-2 times a day & as needed for symptoms of irregular blood glucose. Dispense sufficient amount for indicated testing frequency plus additional to accommodate PRN testing needs. 2/23/21   Kleber Coppola MD   blood glucose test strips (ONE TOUCH ULTRA TEST) strip 1 each by In Vitro route 2 times daily As needed. 2/11/21   Kleber Coppola MD          Inpatient Medications:   meropenem  1,000 mg Intravenous Q8H    insulin lispro  0-6 Units Subcutaneous TID WC    insulin lispro  0-3 Units Subcutaneous Nightly    aspirin  81 mg Oral Daily    amLODIPine  2.5 mg Oral Daily    magnesium oxide  400 mg Oral Daily    pravastatin  40 mg Oral Daily    sodium chloride flush  5-40 mL Intravenous 2 times per day    [Held by provider] enoxaparin  40 mg Subcutaneous Daily    ciprofloxacin  400 mg Intravenous Q12H       IV drips:   dextrose      sodium chloride 100 mL/hr at 04/18/21 2113    sodium chloride         PRN:  glucose, dextrose, glucagon (rDNA), dextrose, pantoprazole, sodium chloride flush, sodium chloride, promethazine **OR** ondansetron, polyethylene glycol, acetaminophen **OR** acetaminophen, perflutren lipid microspheres, benzonatate, dextromethorphan-guaiFENesin    Allergy:     Actos [pioglitazone hydrochloride], Lisinopril, Penicillins, Lipitor, Zocor [simvastatin], and Glucophage [metformin hydrochloride]       Review of Systems:     All 12 point review of symptoms completed. Pertinent positives identified in the HPI, all other review of symptoms negative as below. CONSTITUTIONAL: Nounanticipated weight loss.  No change in energy level, sleep pattern, or activity level. SKIN: No rash or pruritis. EYES: No visual changes or diplopia. No scleral icterus. ENT: No Headaches, hearing loss or vertigo. No mouth sores or sore throat. CARDIOVASCULAR: No chest pain/chest pressure/chest discomfort. No palpitations. RESPIRATORY: No cough or wheezing, no sputum production. No hematemesis. GASTROINTESTINAL: No N/V/D. No abdominal pain, appetite loss, blood in stools. GENITOURINARY: No dysuria, trouble voiding, or hematuria. MUSCULOSKELETAL:  No gait disturbance, weakness or joint complaints. NEUROLOGICAL: No headache, diplopia, change in muscle strength, numbness or tingling. No change in gait, balance, coordination, mood, affect, memory, mentation, behavior. PSHYCH: No anxiety, loss of interest, change in sexual behavior, feelings of self-harm, or confusion. ENDOCRINE: No malaise, fatigue or temperature intolerance. No excessive thirst, fluid intake, or urination. No tremor. HEMATOLOGIC: No abnormal bruising or bleeding. ALLERGY: No nasal congestion or hives.       Physical Examination:     Vitals:    04/18/21 2300 04/19/21 0340 04/19/21 0727 04/19/21 1050   BP: (!) 126/58 (!) 131/37 (!) 140/40 (!) 124/46   Pulse: 72 65 66 63   Resp: 20 20 22 20   Temp: 98.9 °F (37.2 °C) 98.6 °F (37 °C) 97.5 °F (36.4 °C) 98.2 °F (36.8 °C)   TempSrc: Oral Oral Oral Oral   SpO2: 96% (!) 89% 93% 90%   Weight:  166 lb 10.7 oz (75.6 kg)     Height:           Wt Readings from Last 3 Encounters:   04/19/21 166 lb 10.7 oz (75.6 kg)   02/23/21 165 lb (74.8 kg)   10/08/20 165 lb (74.8 kg)       Objective      General Appearance:  Alert, cooperative, no distress, appears stated age Appropriate weight   Head:  Normocephalic, without obvious abnormality, atraumatic   Eyes:  PERRL, conjunctiva/corneas clear EOM intact  Ears normal   Throat no lesions       Nose: Nares normal, no drainage or sinus tenderness   Throat: Lips, mucosa, and tongue normal   Neck: Supple, symmetrical, trachea midline, no adenopathy, thyroid: not enlarged, symmetric, no tenderness/mass/nodules, no carotid bruit or JVD       Lungs:   Clear to auscultation bilaterally, respirations unlabored   Chest Wall:  No tenderness or deformity   Heart:  Regular rate and rhythm, S1, S2 normal, no murmur, rub or gallop PMI intact   Abdomen:   Soft, non-tender, bowel sounds active all four quadrants,  no masses, no organomegaly       Extremities: Extremities normal, atraumatic, no cyanosis or edema   Pulses: 2+ and symmetric   Skin: Skin color, texture, turgor normal, no rashes or lesions   Pysch: Normal mood and affect   Neurologic: Normal gross motor and sensory exam.  Cranial nerves intact        Labs:     Recent Labs     04/16/21  1603 04/17/21  0400 04/18/21  0230 04/19/21  0428   * 140 134* 135*   K 3.7 4.1 4.8 4.0   BUN 17 15 17 13   CREATININE 0.5* <0.5* 0.8 0.6    108 107 106   CO2 22 20* 17* 23   GLUCOSE 165* 124* 190* 126*   CALCIUM 9.4 9.0 7.6* 7.4*     Recent Labs     04/16/21  1603 04/17/21  0400 04/18/21  0230 04/19/21  0428   WBC 8.6 9.0 6.5 6.4   HGB 14.5 14.0 12.6 12.5   HCT 41.7 41.2 36.8 36.5   PLT 69* 61* 47* 45*   MCV 93.3 94.6 94.9 95.0     No results for input(s): CHOLTOT, TRIG, HDL in the last 72 hours. Invalid input(s): LIPIDCOMM, CHOLHDL, VLDCHOL, LDL  No results for input(s): PTT, INR in the last 72 hours. Invalid input(s): PT  No results for input(s): CKTOTAL, CKMB, CKMBINDEX, TROPONINI in the last 72 hours. No results for input(s): BNP in the last 72 hours. No results for input(s): TSH in the last 72 hours. No results for input(s): CHOL, HDL, LDLCALC, TRIG in the last 72 hours.]    Lab Results   Component Value Date    TROPONINI <0.01 01/06/2019         Imaging:     I personally reviewed imaging studies including CXR, Stress test, TTE/BRAYAN. Last ECG (if available) - EKG:  I have reviewed EKG with the following interpretation NSR, no ST-T changes.      Telemetry:  NSR    Last Stress (if available):    Last TTE/BRAYAN(if available):    - Left ventricle: The cavity size is normal. Wall thickness was increased in a pattern of mild   LVH. Systolic function was normal. The estimated ejection fraction was in the range of 55% to 60%. Wall    motion was normal; there were no regional wall motion abnormalities. Doppler parameters are    consistent with abnormal left ventricular relaxation (grade 1 diastolic dysfunction). - Ventricular septum: The outflow septum has a sigmoid appearance. - Aortic valve: Poorly visualized. There is a 19 mm [employer] bioprosthesis by history. Transvalvular    Last Cath (if available): CARDIAC CATHETERIZATION 6/6/2013   Dr. Altamirano Parent - coronaries WNL , LVEF WNL     Last CMR  (if available):      Assessment / Plan:     Bacteremia with history of bioprosthetic valve   - AVR 7/2013 bioprosthetic   - History of Ps aeruginosa bacteremia 3/31/20 & Currently Positive Blood cultures  - Antibiotics per Infectious Disease   - Currently pending echo results to look for source of bacteremia   - May need BRAYAN depending on results of echo   - Continue Aspirin     Hypertension   - Currently on Amlodipine     Nancy Jay, PGY 3   Will staff with Dr. Ian Thomas       Staff Note      Patient seen and evaluated with the medical resident. I was physically present during the critical portions of the service when performed by the resident including the assessment and management of the patient. Will assess TTE and decide if BRAYAN is needed. Thank you. I agree with the findings and plans as described.

## 2021-04-19 NOTE — PLAN OF CARE
Problem: Gas Exchange - Impaired:  Goal: Levels of oxygenation will improve  Description: Levels of oxygenation will improve  4/19/2021 0126 by Elvia Oppenheim, RN  Note: 02 96% on room air, denies shortness of breath       Problem: Falls - Risk of:  Goal: Will remain free from falls  Description: Will remain free from falls  4/19/2021 0126 by Elvia Oppenheim, RN  Note:  Pt free from injury or falls at this time, fall precautions in place, bed in low position, side rail up x2, Fay Fall Risk: Medium (25-44), up with assist, calls with needs, call light in reach, will continue to monitor. Pt verbalizes understanding of fall risk procedures. Problem: Pain:  Goal: Pain level will decrease  Description: Pain level will decrease  Note: Denies pain at this time, vitals stable, assisted to position of comfort.

## 2021-04-19 NOTE — PROGRESS NOTES
Progress Note    Admit Date: 4/16/2021  Diet: Diet NPO, After Midnight    CC: fever/chills    Interval history: Patient spiked a fever of 100.6 overnight. She also continued to have thick, non-roductive cough. Patient is exhausted this AM, says she had a bad night and has been short of breath. HPI: Vin Miller is an 80 y.o. female with history of aortic valve replacement with bioprosthetic valve (2013), moderate aortic insufficiency, GERD, MI in 2007, hypertension, and previous history of pseudomonas bacteremia (3/2020) and previously strep (5/2019) who presents from home with fever and chills of 3 day duration. The patient was working outside in the yard this morning and when she went inside she noticed that she began chilling and then developed rigors and felt that she needed to lay down in bed to cover herself up to keep warm. She reported subjective fevers at home and stated she felt similar to how she felt when she had her prior bloodstream infections. She called 911 to have her brought to the hospital because she was still feeling the rigors. She states that she has had bloodstream infections in the past but that a source was never identified. She is not complaining of any chest pain or shortness of breath, has no abdominal pain, dysuria, or pain anywhere or headache neck pain or skin infections.  She did have her Covid vaccination series, last one March 11.      In the ED she was given a dose of vancomycin and cefepime. Her prior cultures were reviewed and she grew Streptococcus salivarius in 2019 and Pseudomonas aeruginosa in 2020 (resistant to aztreonam, cefepime, ceftazidime, and zosyn, sensitive to ciprofloxacin and merrem). Temperature on admission was 99.4, however she had taken tylenol at home prior.  \" - taken from admission H&P    Medications:     Scheduled Meds:   meropenem  1,000 mg Intravenous Q8H    insulin lispro  0-6 Units Subcutaneous TID     insulin lispro  0-3 Units Subcutaneous Nightly    aspirin  81 mg Oral Daily    amLODIPine  2.5 mg Oral Daily    magnesium oxide  400 mg Oral Daily    pravastatin  40 mg Oral Daily    sodium chloride flush  5-40 mL Intravenous 2 times per day    [Held by provider] enoxaparin  40 mg Subcutaneous Daily    ciprofloxacin  400 mg Intravenous Q12H     Continuous Infusions:   dextrose      sodium chloride 100 mL/hr at 04/18/21 2113    sodium chloride       PRN Meds:glucose, dextrose, glucagon (rDNA), dextrose, pantoprazole, sodium chloride flush, sodium chloride, promethazine **OR** ondansetron, polyethylene glycol, acetaminophen **OR** acetaminophen, perflutren lipid microspheres, benzonatate, dextromethorphan-guaiFENesin    Objective:   Vitals:   T-max:  Patient Vitals for the past 8 hrs:   BP Temp Temp src Pulse Resp SpO2 Weight   04/19/21 0727 (!) 140/40 97.5 °F (36.4 °C) Oral 66 22 93 %    04/19/21 0340 (!) 131/37 98.6 °F (37 °C) Oral 65 20 (!) 89 % 166 lb 10.7 oz (75.6 kg)       Intake/Output Summary (Last 24 hours) at 4/19/2021 1004  Last data filed at 4/19/2021 0730  Gross per 24 hour   Intake 2600 ml   Output 1400 ml   Net 1200 ml           Physical Exam  General appearance:  No apparent distress, appears stated age and cooperative. HEENT:  Normal cephalic,atraumatic without obvious deformity. Pupils equal, round, and reactive to light. Extra ocular muscles intact. Conjunctivae/corneas clear. No caries noted. Neck: Supple, with full range of motion. No jugular venous distention. Trachea midline. Respiratory:  Normal respiratory effort. Clear to auscultation, bilaterally without Rales/Wheezes/Rhonchi. Cardiovascular:  Regular rate and rhythm with normal S1/S2. Abdomen: Soft, non-tender, non-distended with normal bowel sounds. Musculoskeletal:  No clubbing, cyanosis or edema bilaterally. Full range of motion without deformity. No septic or swollen appearing joints. Skin: Skin color, texture, turgor normal.  No rashes or lesions.  No boils, pimples or sores. Neurologic:  Neurovascularly intact without any focal sensory/motor deficits. Cranialnerves: II-XII intact, grossly non-focal.  Psychiatric:  Alert and oriented, thought content appropriate,normal insight  Capillary Refill: Brisk,< 3 seconds   Peripheral Pulses: +2 palpable, equal bilaterally   LABS:    CBC:   Recent Labs     04/17/21 0400 04/18/21 0230 04/19/21  0428   WBC 9.0 6.5 6.4   HGB 14.0 12.6 12.5   HCT 41.2 36.8 36.5   PLT 61* 47* 45*   MCV 94.6 94.9 95.0     Renal:    Recent Labs     04/17/21 0400 04/18/21 0230 04/19/21  0428    134* 135*   K 4.1 4.8 4.0    107 106   CO2 20* 17* 23   BUN 15 17 13   CREATININE <0.5* 0.8 0.6   GLUCOSE 124* 190* 126*   CALCIUM 9.0 7.6* 7.4*   ANIONGAP 12 10 6     -----------------------------------------------------------------  RAD:   XR CHEST (2 VW)   Final Result      No acute cardiopulmonary disease identified. Assessment/Plan:   Sepsis 2/2 pseudomonas bacteremia, source uncertain  Blood cx from  4/16/21 now positive for Pseudomonas. Prior cultures were reviewed and she grew Streptococcus salivarius in 2019 and Pseudomonas aeruginosa in 2020 (resistant to aztreonam, cefepime, ceftazidime, and zosyn, sensitive to ciprofloxacin and merrem). S/p sepsis protocol fluids. UA and CXR negative for infection. Tmax 99.4 (after tylenol), lactate 2.3, RR 20, elevated procal 0.85 on admission. COVID -[place]. 1 set of blood cultures positive for pseudomonas.   - f/u blood cultures (2 sets of 2), fungal cx, MRSA swab  - consult cards  - f/u BRAYAN  - on cipro 400 mg IV BID + IV Meropenem x 1 gm Q 8 hrs  - ID consulted - appreciate recs  - continue maintenance fluids @ 100 cc/hr     NAGMA - resolved    Chronic:  HTN - continue home norvasc 2.5 mg PO qD  HLD - continue home pravastatin 40 mg PO qD    Code Status: Limited  FEN:NPO   PPX: Lovenox, SCDs  DISPO: SOTERO Mason MD, PGY-1  04/19/21  10:04 AM    This patient has been staffed and discussed with Carole Anand MD.       Addendum to Resident H& P/Progress note:  I have personally seen,examined and evaluated the patient. I have reviewed the current history, physical findings, labs and assessment and plan and agree with note as documented by resident MD ( )    The patient underwent TTE earlier today:  Left ventricular cavity size is normal. There is mild to moderate concentric    left ventricular hypertrophy. Overall left ventricular systolic function    appears normal with an ejection fraction of 50-55%. No regional wall motion    abnormalities are noted. Diastolic filling parameters suggest grade II    diastolic dysfunction. The mitral valve leaflets are slightly thickened with    normal leaflet mobility. Mitral annular calcification is present. The peak    mitral valve velocity is 1.78 m/s and the mean pressure gradient is 4 mmHg.    A bioprosthetic artificial aortic valve appears well seated with a maximum    velocity of 4.12m/s and a mean gradient of 41mmHg. Moderate aortic    regurgitation. Mild to moderate tricuspid regurgitation. Estimated pulmonary    artery systolic pressure is at 43 mmHg assuming a right atrial pressure of 8    mmHg. The right ventricle is normal in size. TAPSE measures 1.78 cm and the    RVS velocity measures 8.7cm/s. The left atrium is at the upper limits of    normal in size. IVC size is dilated (>2.1 cm) but collapses > 50% with    respiration consistent with elevated RA pressure (8 mmHg).    There are small lesions on both the mitral and aortic valves suspicious for    vegetation(s). Will continue current management.  Awaiting for blood cx sensitivities     Carole Anand MD, [address]

## 2021-04-19 NOTE — PROGRESS NOTES
ID Follow-up NOTE    CC:   Pseudomonas bacteremia  Antibiotics: Meropenem, Ciprofloxacin    Admit Date: 2021  Hospital Day: 4    Subjective:     Patient c/o 'tired', couldn't sleep  SOB at night, wakes up. Objective:     Patient Vitals for the past 8 hrs:   BP Temp Temp src Pulse Resp SpO2 Weight   21 0727 (!) 140/40 97.5 °F (36.4 °C) Oral 66 22 93 %    21 0340 (!) 131/37 98.6 °F (37 °C) Oral 65 20 (!) 89 % 166 lb 10.7 oz (75.6 kg)     I/O last 3 completed shifts: In: 9360 [P.O.:720; I.V.:1800; IV Piggyback:200]  Out: 1600 [Urine:1600]  No intake/output data recorded. EXAM:  GENERAL: No apparent distress.     HEENT: Membranes moist, no oral lesion  NECK:  Supple, no lymphadenopathy  LUNGS: Clear b/l, no rales, no dullness  CARDIAC: RRR, no murmur appreciated  ABD:  + BS, soft / NT  EXT:  No rash, no edema, no lesions  NEURO: No focal neurologic findings  PSYCH: Orientation, sensorium, mood normal  LINES:  Peripheral iv       Data Review:  Lab Results   Component Value Date    WBC 6.4 2021    HGB 12.5 2021    HCT 36.5 2021    MCV 95.0 2021    PLT 45 (L) 2021     Lab Results   Component Value Date    CREATININE 0.6 2021    BUN 13 2021     (L) 2021    K 4.0 2021     2021    CO2 23 2021       Hepatic Function Panel:   Lab Results   Component Value Date    ALKPHOS 61 2021    ALT 21 2021    AST 33 2021    PROT 7.0 2021    PROT 7.4 10/09/2012    BILITOT 1.0 2021    BILIDIR <0.2 2020    IBILI see below 2020    LABALBU 4.0 2021       MICRO:   BC Ps aeruginosa  Pseudomonas aeruginosa  Antibiotic Interpretation PEBBLES   cefepime Intermediate 16 mcg/mL   ciprofloxacin Sensitive <=1 mcg/mL   gentamicin Intermediate 8 mcg/mL   meropenem Sensitive <=1 mcg/mL   piperacillin-tazobactam Sensitive <=16 mcg/mL   tobramycin Sensitive <=4 mcg/mL      BC no growth    IMAGIN/16 CXR neg      Scheduled Meds:   meropenem  1,000 mg Intravenous Q8H    insulin lispro  0-6 Units Subcutaneous TID WC    insulin lispro  0-3 Units Subcutaneous Nightly    aspirin  81 mg Oral Daily    amLODIPine  2.5 mg Oral Daily    magnesium oxide  400 mg Oral Daily    pravastatin  40 mg Oral Daily    sodium chloride flush  5-40 mL Intravenous 2 times per day    [Held by provider] enoxaparin  40 mg Subcutaneous Daily    ciprofloxacin  400 mg Intravenous Q12H       Continuous Infusions:   dextrose      sodium chloride 100 mL/hr at 04/18/21 2113    sodium chloride         PRN Meds:  glucose, dextrose, glucagon (rDNA), dextrose, pantoprazole, sodium chloride flush, sodium chloride, promethazine **OR** ondansetron, polyethylene glycol, acetaminophen **OR** acetaminophen, perflutren lipid microspheres, benzonatate, dextromethorphan-guaiFENesin      Assessment:     Hx HTN, AS  AVR 7/2013 (Jemima Calvillo), CAD, nephrolithiasis  Hx E coli bacteremia, 3/2013  Hx Streptococcal salivarius bacteremia 5/31/2019  Hx Ps aeruginosa bacteremia 3/31/20    Admit 4/16/21 - fever, rigor / shaking chill (acute onset, no other sx)  Pseudomonas aeruginosa bacteremia - in setting bioprosthetic AVR    Plan:     Cont meropenem, ciprofloxacin for now  Await Pseudomonas sensitivity - will adjust antibiotics with results   Await Echo result - may need BRAYAN    Discussed with pt   Nika Simmons MD

## 2021-04-19 NOTE — PROGRESS NOTES
Physical Therapy    Facility/Department: [place] [address] [facility]  Initial Assessment    NAME: Nick Kaye  : 1935  MRN: [id number]    Date of Service: 2021    Discharge Recommendations:Rosalinda Perry scored a 20/24 on the AM-PAC short mobility form. Current research shows that an AM-PAC score of 18 or greater is typically associated with a discharge to the patient's home setting. Based on the patient's AM-PAC score and their current functional mobility deficits, it is recommended that the patient have 2-3 sessions per week of Physical Therapy at d/c to increase the patient's independence. At this time, this patient demonstrates the endurance and safety to discharge home with (home  services) and a follow up treatment frequency of 2-3x/wk. Please see assessment section for further patient specific details. If patient discharges prior to next session this note will serve as a discharge summary. Please see below for the latest assessment towards goals. PT Equipment Recommendations  Equipment Needed: No    Assessment   Assessment: 81 yo admitted 21 for fever/chills. Pt demo mobility below her reported baseline of independent at home without AD. Pt plans to return home at discharge and reports someone could stay with her initially if needed. If home, recommend 24 hour supervision initially, home PT. Treatment Diagnosis: mobility impairment due to fever/chills  Decision Making: Low Complexity  Patient Education: Pt educated on PT role, importance of OOB mobility, need to call for assist to get up and she verbalized understanding. REQUIRES PT FOLLOW UP: Yes  Activity Tolerance  Activity Tolerance: Patient Tolerated treatment well;Patient limited by endurance       Patient Diagnosis(es): The encounter diagnosis was Septicemia ([facility] [address]      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, Aortic stenosis, Benign neoplasm of colon, Cancer ([facility] [address] Carcinoma in situ of cervix uteri, Coronary atherosclerosis of unspecified type of vessel, native or graft, Essential hypertension, benign, History of kidney stones, Internal hemorrhoids without mention of complication, MI, old, Other and unspecified hyperlipidemia, Palpitations, Peptic ulcer, unspecified site, unspecified as acute or chronic, without mention of hemorrhage, perforation, or obstruction, Sebaceous cyst of breast, Trigger finger, 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 has a past surgical history that includes Colonoscopy (03/30/2011); back surgery (1977); Cervix removal (1964); Colonoscopy (02/23/2009); Cataract removal (5/2012); Colonoscopy (5/8/2013 ); Cystocopy (05004062); Cardiac catheterization (6/6/2013 ); other surgical history (6/27/2013); Aortic valve replacement (7/12/2013 ); Colonoscopy (5/4/2015, 3/7/2016); Upper gastrointestinal endoscopy (3/7/2016); Upper gastrointestinal endoscopy (N/A, 1/28/2019); Colonoscopy (1/28/2019); Colonoscopy (1/28/2019); and Colonoscopy (1/28/2019). Restrictions  Position Activity Restriction  Other position/activity restrictions: up with assist     Vision/Hearing  Vision: Impaired  Vision Exceptions: Wears glasses at all times  Hearing: Within functional limits       Subjective  General  Chart Reviewed: Yes  Additional Pertinent Hx: 80 y.o. female with history of aortic valve replacement with bioprosthetic valve (2013), moderate aortic insufficiency, GERD, MI in 2007, hypertension, and previous history of pseudomonas bacteremia (3/2020) and previously strep (5/2019) who presented to ED  from home 4/16/21 with fever and chills of 3 day duration. Cardiology consult; echo. Family / Caregiver Present: No  Diagnosis: Sepsis 2/2 pseudomonas bacteremia, source uncertain  Follows Commands: Within Functional Limits  Subjective  Subjective: Pt found supine in bed and agreeable to PT with encouragement. \" I had a terrible night. \"  Pain Screening  Patient Currently in Pain: Denies         Orientation  Orientation  Overall Orientation Status: Within Functional Limits     Social/Functional History  Social/Functional History  Lives With: Alone  Type of Home: House  Home Layout: One level, Laundry in basement  Home Access: Stairs to enter with rails(one to enter with rail)  Bathroom Shower/Tub: Tub/Shower unit  Bathroom Toilet: Standard(sink nearby; has BSC x2 but not using)  Bathroom Equipment: Grab bars in shower, Shower chair  Home Equipment: 44 [employer] walker(not using DME PTA)  Receives Help From: Family, Friend(s)(neighbors check on pt daily)  ADL Assistance: Independent  Homemaking Assistance: Independent  Ambulation Assistance: Independent  Transfer Assistance: Independent  Active : Yes  Occupation: Retired  Type of occupation: teacher  Additional Comments: Pt denies any recent falls.        Objective          AROM RLE (degrees)  RLE AROM: WFL  AROM LLE (degrees)  LLE AROM : WFL     Strength RLE  Strength RLE: WFL  Strength LLE  Strength LLE: WFL        Bed mobility  Supine to Sit: Modified independent  Sit to Supine: Modified independent  Scooting: Modified independent     Transfers  Sit to Stand: Stand by assistance(x2 trials)  Stand to sit: Stand by assistance(x2 trials)     Ambulation  Device: No Device  Assistance: Contact guard assistance(progressing to SBA)  Quality of Gait: slow, steady gait with occ stagger step that pt was able to self correct; pt fatigued quickly  Distance: 70 ft              Plan   Plan  Times per week: 2-5  Current Treatment Recommendations: Transfer Training, Balance Training, Stair training, Functional

## 2021-04-20 ENCOUNTER — APPOINTMENT (OUTPATIENT)
Dept: GENERAL RADIOLOGY | Age: 86
DRG: 871 | End: 2021-04-20
Payer: MEDICARE

## 2021-04-20 LAB
ANION GAP SERPL CALCULATED.3IONS-SCNC: 9 MMOL/L (ref 3–16)
BASOPHILS ABSOLUTE: 0 K/UL (ref 0–0.2)
BASOPHILS RELATIVE PERCENT: 0.7 %
BLOOD CULTURE, ROUTINE: NORMAL
BUN BLDV-MCNC: 10 MG/DL (ref 7–20)
CALCIUM SERPL-MCNC: 7.8 MG/DL (ref 8.3–10.6)
CHLORIDE BLD-SCNC: 108 MMOL/L (ref 99–110)
CO2: 23 MMOL/L (ref 21–32)
CREAT SERPL-MCNC: <0.5 MG/DL (ref 0.6–1.2)
EOSINOPHILS ABSOLUTE: 0.3 K/UL (ref 0–0.6)
EOSINOPHILS RELATIVE PERCENT: 4.2 %
GFR AFRICAN AMERICAN: >60
GFR NON-AFRICAN AMERICAN: >60
GLUCOSE BLD-MCNC: 101 MG/DL (ref 70–99)
GLUCOSE BLD-MCNC: 127 MG/DL (ref 70–99)
GLUCOSE BLD-MCNC: 128 MG/DL (ref 70–99)
GLUCOSE BLD-MCNC: 131 MG/DL (ref 70–99)
GLUCOSE BLD-MCNC: 191 MG/DL (ref 70–99)
HCT VFR BLD CALC: 38.5 % (ref 36–48)
HEMOGLOBIN: 13.1 G/DL (ref 12–16)
LYMPHOCYTES ABSOLUTE: 1.1 K/UL (ref 1–5.1)
LYMPHOCYTES RELATIVE PERCENT: 18.4 %
MCH RBC QN AUTO: 32.1 PG (ref 26–34)
MCHC RBC AUTO-ENTMCNC: 34.1 G/DL (ref 31–36)
MCV RBC AUTO: 94.3 FL (ref 80–100)
MONOCYTES ABSOLUTE: 0.6 K/UL (ref 0–1.3)
MONOCYTES RELATIVE PERCENT: 10.6 %
MRSA CULTURE ONLY: NORMAL
NEUTROPHILS ABSOLUTE: 3.9 K/UL (ref 1.7–7.7)
NEUTROPHILS RELATIVE PERCENT: 66.1 %
PDW BLD-RTO: 14.3 % (ref 12.4–15.4)
PERFORMED ON: ABNORMAL
PLATELET # BLD: 60 K/UL (ref 135–450)
PLATELET SLIDE REVIEW: ADEQUATE
PMV BLD AUTO: 10.6 FL (ref 5–10.5)
POTASSIUM REFLEX MAGNESIUM: 4.2 MMOL/L (ref 3.5–5.1)
PRO-BNP: 5558 PG/ML (ref 0–449)
PROCALCITONIN: 2.06 NG/ML (ref 0–0.15)
RBC # BLD: 4.09 M/UL (ref 4–5.2)
RBC # BLD: NORMAL 10*6/UL
SODIUM BLD-SCNC: 140 MMOL/L (ref 136–145)
WBC # BLD: 6 K/UL (ref 4–11)

## 2021-04-20 PROCEDURE — 6360000002 HC RX W HCPCS: Performed by: STUDENT IN AN ORGANIZED HEALTH CARE EDUCATION/TRAINING PROGRAM

## 2021-04-20 PROCEDURE — 2060000000 HC ICU INTERMEDIATE R&B

## 2021-04-20 PROCEDURE — 71045 X-RAY EXAM CHEST 1 VIEW: CPT

## 2021-04-20 PROCEDURE — 83880 ASSAY OF NATRIURETIC PEPTIDE: CPT

## 2021-04-20 PROCEDURE — 6360000002 HC RX W HCPCS: Performed by: INTERNAL MEDICINE

## 2021-04-20 PROCEDURE — 85025 COMPLETE CBC W/AUTO DIFF WBC: CPT

## 2021-04-20 PROCEDURE — 6370000000 HC RX 637 (ALT 250 FOR IP): Performed by: STUDENT IN AN ORGANIZED HEALTH CARE EDUCATION/TRAINING PROGRAM

## 2021-04-20 PROCEDURE — 84145 PROCALCITONIN (PCT): CPT

## 2021-04-20 PROCEDURE — 2580000003 HC RX 258: Performed by: INTERNAL MEDICINE

## 2021-04-20 PROCEDURE — 2580000003 HC RX 258: Performed by: STUDENT IN AN ORGANIZED HEALTH CARE EDUCATION/TRAINING PROGRAM

## 2021-04-20 PROCEDURE — 99232 SBSQ HOSP IP/OBS MODERATE 35: CPT | Performed by: HOSPITALIST

## 2021-04-20 PROCEDURE — 99232 SBSQ HOSP IP/OBS MODERATE 35: CPT | Performed by: INTERNAL MEDICINE

## 2021-04-20 PROCEDURE — 36415 COLL VENOUS BLD VENIPUNCTURE: CPT

## 2021-04-20 PROCEDURE — 80048 BASIC METABOLIC PNL TOTAL CA: CPT

## 2021-04-20 PROCEDURE — 99233 SBSQ HOSP IP/OBS HIGH 50: CPT | Performed by: INTERNAL MEDICINE

## 2021-04-20 RX ORDER — FUROSEMIDE 10 MG/ML
20 INJECTION INTRAMUSCULAR; INTRAVENOUS ONCE
Status: DISCONTINUED | OUTPATIENT
Start: 2021-04-20 | End: 2021-04-22

## 2021-04-20 RX ORDER — FUROSEMIDE 10 MG/ML
20 INJECTION INTRAMUSCULAR; INTRAVENOUS 2 TIMES DAILY
Status: COMPLETED | OUTPATIENT
Start: 2021-04-20 | End: 2021-04-21

## 2021-04-20 RX ADMIN — PANTOPRAZOLE SODIUM 40 MG: 40 TABLET, DELAYED RELEASE ORAL at 08:26

## 2021-04-20 RX ADMIN — GUAIFENESIN AND DEXTROMETHORPHAN HYDROBROMIDE 1 TABLET: 600; 30 TABLET, EXTENDED RELEASE ORAL at 22:10

## 2021-04-20 RX ADMIN — CIPROFLOXACIN 400 MG: 2 INJECTION, SOLUTION INTRAVENOUS at 00:21

## 2021-04-20 RX ADMIN — BENZONATATE 100 MG: 100 CAPSULE ORAL at 08:33

## 2021-04-20 RX ADMIN — FUROSEMIDE 20 MG: 10 INJECTION, SOLUTION INTRAMUSCULAR; INTRAVENOUS at 10:26

## 2021-04-20 RX ADMIN — GUAIFENESIN AND DEXTROMETHORPHAN HYDROBROMIDE 1 TABLET: 600; 30 TABLET, EXTENDED RELEASE ORAL at 08:33

## 2021-04-20 RX ADMIN — MEROPENEM 1000 MG: 1 INJECTION, POWDER, FOR SOLUTION INTRAVENOUS at 01:22

## 2021-04-20 RX ADMIN — CIPROFLOXACIN 400 MG: 2 INJECTION, SOLUTION INTRAVENOUS at 11:34

## 2021-04-20 RX ADMIN — MAGNESIUM OXIDE TAB 400 MG (240 MG ELEMENTAL MG) 400 MG: 400 (240 MG) TAB at 08:26

## 2021-04-20 RX ADMIN — ASPIRIN 81 MG: 81 TABLET, COATED ORAL at 08:26

## 2021-04-20 RX ADMIN — PRAVASTATIN SODIUM 40 MG: 40 TABLET ORAL at 08:26

## 2021-04-20 RX ADMIN — BENZONATATE 100 MG: 100 CAPSULE ORAL at 18:12

## 2021-04-20 RX ADMIN — CIPROFLOXACIN 400 MG: 2 INJECTION, SOLUTION INTRAVENOUS at 23:50

## 2021-04-20 RX ADMIN — MEROPENEM 1000 MG: 1 INJECTION, POWDER, FOR SOLUTION INTRAVENOUS at 18:06

## 2021-04-20 RX ADMIN — Medication 5 ML: at 21:00

## 2021-04-20 RX ADMIN — AMLODIPINE BESYLATE 2.5 MG: 2.5 TABLET ORAL at 08:26

## 2021-04-20 RX ADMIN — FUROSEMIDE 20 MG: 10 INJECTION, SOLUTION INTRAMUSCULAR; INTRAVENOUS at 18:13

## 2021-04-20 RX ADMIN — Medication 10 ML: at 08:26

## 2021-04-20 ASSESSMENT — PAIN SCALES - GENERAL
PAINLEVEL_OUTOF10: 0

## 2021-04-20 NOTE — PROGRESS NOTES
RESPIRATORY THERAPY ASSESSMENT    Name:  Dragan Spring Record Number:  [id number]  Age: 80 y.o. Gender: female  : 1935  Today's Date:  2021  Room:  [address] [place] [address]    Assessment     Is the patient being admitted for a COPD or Asthma exacerbation? No   (If yes the patient will be seen every 4 hours for the first 24 hours and then reassessed)    Patient Admission Diagnosis      Allergies  Allergies   Allergen Reactions    Actos [Pioglitazone Hydrochloride]      angioedema     Lisinopril Other (See Comments)     Swelling of tongue    Penicillins Hives and Itching    Lipitor      myalgias    Zocor [Simvastatin] Other (See Comments)     Leg tingling , myalgias    Glucophage [Metformin Hydrochloride]      diarrhea       Minimum Predicted Vital Capacity:     812          Actual Vital Capacity:      n/a              Pulmonary History:No history  Home Oxygen Therapy:  room air  Home Respiratory Therapy:None   Current Respiratory Therapy:  hhn albuterol prn          Respiratory Severity Index(RSI)   Patients with orders for inhalation medications, oxygen, or any therapeutic treatment modality will be placed on Respiratory Protocol. They will be assessed with the first treatment and at least every 72 hours thereafter. The following severity scale will be used to determine frequency of treatment intervention. Smoking History: No Smoking History = 0    Social History  Social History     Tobacco Use    Smoking status: Never Smoker    Smokeless tobacco: Never Used   Substance Use Topics    Alcohol use:  Yes     Alcohol/week: 0.0 standard drinks     Comment: Occasional glass of wine    Drug use: No       Recent Surgical History: None = 0  Past Surgical History  Past Surgical History:   Procedure Laterality Date    AORTIC VALVE REPLACEMENT  2013     Dr. Micheline Dudley  2013     Dr. Aman Kraus - coronaries WNL , LVEF WNL     CATARACT REMOVAL  5/2012    Dr. Tim Cash - bilateral    Vennie Spore    Dr. Spencer Rodarte  03/30/2011    DR. Mello  - Internal hemorrhoids, sigmoid diverticulosis, hyperplastic polyps. Repeat in five  years.  COLONOSCOPY  02/23/2009    nternal hemorrhoids, Diverticulosis, Colon polyps. Repeat two years    COLONOSCOPY  5/8/2013     Dr. Jero Singh  - internal hemorrhoids , diverticulosis and polyps     COLONOSCOPY  5/4/2015, 3/7/2016    - hemorrhoids, diverticulosis, hyperplastic colon polyps, repeat in 1 year    COLONOSCOPY  1/28/2019    COLONOSCOPY WITH BIOPSY performed by Jesse Rojas MD at [address]  1/28/2019    COLONOSCOPY POLYPECTOMY ABLATION performed by Jesse Rojas MD at [address]  1/28/2019    COLONOSCOPY CONTROL HEMORRHAGE performed by Jesse Rojas MD at [address] [facility]  [id number]     DR. Dacosta - CYSTOSCOPY RETROGRADE, RIGHT STENT PLACEMENT    OTHER SURGICAL HISTORY  6/27/2013    Dr. Kamari Johnson - CYSTOSCOPY RIGHT URETEROSCOPY, RENAL STONE EXTRACTION    UPPER GASTROINTESTINAL ENDOSCOPY  3/7/2016    gastritis and esophagitis     UPPER GASTROINTESTINAL ENDOSCOPY N/A 1/28/2019    EGD BIOPSY performed by Jesse Rojas MD at [facility] ENDOSCOPY       Level of Consciousness: Alert, Oriented, and Cooperative = 0    Level of Activity: Walking with assistance = 1    Respiratory Pattern: Regular Pattern; RR 8-20 = 0    Breath Sounds: Diminshed bilaterally and/or crackles = 2    Sputum   ,  ,    Cough: Strong, spontaneous, non-productive = 0    Vital Signs   BP (!) 131/44   Pulse 63   Temp 98.3 °F (36.8 °C) (Oral)   Resp 18   Ht 5' 4\" (1.626 m)   Wt 166 lb 10.7 oz (75.6 kg)   SpO2 92%   BMI 28.61 kg/m²   SPO2 (COPD values may differ): 88-89% on room air or greater than 92% on FiO2 28- 35% = 2    Peak Flow (asthma only): not applicable = 0    RSI: 5-6 = Q4hr PRN (every four hours as needed) for dyspnea        Plan       Goals: medication delivery    Patient/caregiver was educated on the proper method of use for Respiratory Care Devices:  Yes      Level of patient/caregiver understanding able to:   ? Verbalize understanding   ? Demonstrate understanding       ? Teach back        ? Needs reinforcement       ? No available caregiver               ? Other:     Response to education:  Excellent     Is patient being placed on Home Treatment Regimen? Yes     Does the patient have everything they need prior to discharge? Yes     Comments: admits with fever/ chills    Plan of Care: hhn albuterol prn    Electronically signed by Rekha Cullen RCP on 4/19/2021 at 11:39 PM    Respiratory Protocol Guidelines     1. Assessment and treatment by Respiratory Therapy will be initiated for medication and therapeutic interventions upon initiation of aerosolized medication. 2. Physician will be contacted for respiratory rate (RR) greater than 35 breaths per minute. Therapy will be held for heart rate (HR) greater than 140 beats per minute, pending direction from physician. 3. Bronchodilators will be administered via Metered Dose Inhaler (MDI) with spacer when the following criteria are met:  a. Alert and cooperative     b. HR < 140 bpm  c. RR < 30 bpm                d. Can demonstrate a 23 second inspiratory hold  4. Bronchodilators will be administered via Hand Held Nebulizer [facility]) to patients when ANY of the following criteria are met  a. Incognizant or uncooperative          b. Patients treated with HHN at Home        c. Unable to demonstrate proper use of MDI with spacer     d. RR > 30 bpm   5. Bronchodilators will be delivered via Metered Dose Inhaler (MDI), HHN, Aerogen to intubated patients on mechanical ventilation. 6. Inhalation medication orders will be delivered and/or substituted as outlined below. Aerosolized Medications Ordering and Administration Guidelines:    1.  All Medications will be ordered by a physician, and their frequency and/or modality will be adjusted as defined by the patients Respiratory Severity Index (RSI) score. 2. If the patient does not have documented COPD, consider discontinuing anticholinergics when RSI is less than 9.  3. If the bronchospasm worsens (increased RSI), then the bronchodilator frequency can be increased to a maximum of every 4 hours. If greater than every 4 hours is required, the physician will be contacted. 4. If the bronchospasm improves, the frequency of the bronchodilator can be decreased, based on the patient's RSI, but not less than home treatment regimen frequency. 5. Bronchodilator(s) will be discontinued if patient has a RSI less than 9 and has received no scheduled or as needed treatment for 72  Hrs. Patients Ordered on a Mucolytic Agent:    1. Must always be administered with a bronchodilator. 2. Discontinue if patient experiences worsened bronchospasm, or secretions have lessened to the point that the patient is able to clear them with a cough. Anti-inflammatory and Combination Medications:    1. If the patient lacks prior history of lung disease, is not using inhaled anti-inflammatory medication at home, and lacks wheezing by examination or by history for at least 24 hours, contact physician for possible discontinuation.

## 2021-04-20 NOTE — PLAN OF CARE
Problem: Infection, Septic Shock:  Goal: Will show no infection signs and symptoms  Description: Will show no infection signs and symptoms  4/20/2021 1057 by Lynda Lu RN  Note: Pt afebrile so far this shift. No s/sx of infection noted. IV antibiotics ordered (see MAR for details). Will cont to monitor     Problem: Falls - Risk of:  Goal: Will remain free from falls  Description: Will remain free from falls  4/20/2021 1057 by Lynda Lu RN  Note: Pt is a Fall Risk. See Isabel Tyson Fall Risk Score. Pt bed in low position and side rails up. Call light and belongings in reach. Pt encouraged to call for assistance. Will continue with hourly rounds for PO intake, pain needs, toileting, and repositioning as needed. Problem: Pain:  Goal: Pain level will decrease  Description: Pain level will decrease  Note: No reports of pain so far this shift.  Will cont to monitor

## 2021-04-20 NOTE — PROGRESS NOTES
ID Follow-up NOTE    CC:   Pseudomonas bacteremia  Antibiotics: Meropenem, Ciprofloxacin    Admit Date: 4/16/2021  Hospital Day: 5    Subjective:     Patient reports she feels good, \"much better\". Objective:     Patient Vitals for the past 8 hrs:   BP Temp Temp src Pulse Resp SpO2   04/20/21 1425    68     04/20/21 1424 (!) 156/39 96.6 °F (35.9 °C) Oral 68 25 94 %   04/20/21 1106 (!) 158/37 96.6 °F (35.9 °C) Oral 75 28 92 %   04/20/21 1018    71  90 %   04/20/21 0818 (!) 157/45 97.5 °F (36.4 °C) Oral 66 22 91 %     I/O last 3 completed shifts: In: 840 [P.O.:840]  Out: 2375 [Urine:2375]  No intake/output data recorded. EXAM:  GENERAL: No apparent distress.   RA  HEENT: Membranes moist, no oral lesion  NECK:  Supple, no lymphadenopathy  LUNGS: Clear b/l, no rales, no dullness  CARDIAC: RRR, no murmur appreciated  ABD:  + BS, soft / NT  EXT:  No rash, no edema, no lesions  NEURO: No focal neurologic findings  PSYCH: Orientation, sensorium, mood normal  LINES:  Peripheral iv       Data Review:  Lab Results   Component Value Date    WBC 6.0 04/20/2021    HGB 13.1 04/20/2021    HCT 38.5 04/20/2021    MCV 94.3 04/20/2021    PLT 60 (L) 04/20/2021     Lab Results   Component Value Date    CREATININE <0.5 (L) 04/20/2021    BUN 10 04/20/2021     04/20/2021    K 4.2 04/20/2021     04/20/2021    CO2 23 04/20/2021       Hepatic Function Panel:   Lab Results   Component Value Date    ALKPHOS 61 02/26/2021    ALT 21 02/26/2021    AST 33 02/26/2021    PROT 7.0 02/26/2021    PROT 7.4 10/09/2012    BILITOT 1.0 02/26/2021    BILIDIR <0.2 05/18/2020    IBILI see below 05/18/2020    LABALBU 4.0 02/26/2021       MICRO:  4/16 BC Ps aeruginosa  Pseudomonas aeruginosa  Antibiotic Interpretation PEBBLES   cefepime Intermediate 16 mcg/mL   ciprofloxacin Sensitive <=1 mcg/mL   gentamicin Intermediate 8 mcg/mL   meropenem Sensitive <=1 mcg/mL   piperacillin-tazobactam Sensitive <=16 mcg/mL   tobramycin Sensitive <=4 mcg/mL      BC no growth    IMAGIN/16 CXR neg      Scheduled Meds:   furosemide  20 mg Intravenous Once    furosemide  20 mg Intravenous BID    meropenem  1,000 mg Intravenous Q8H    insulin lispro  0-6 Units Subcutaneous TID WC    insulin lispro  0-3 Units Subcutaneous Nightly    aspirin  81 mg Oral Daily    amLODIPine  2.5 mg Oral Daily    magnesium oxide  400 mg Oral Daily    pravastatin  40 mg Oral Daily    sodium chloride flush  5-40 mL Intravenous 2 times per day    [Held by provider] enoxaparin  40 mg Subcutaneous Daily    ciprofloxacin  400 mg Intravenous Q12H       Continuous Infusions:   dextrose      sodium chloride         PRN Meds:  albuterol, glucose, dextrose, glucagon (rDNA), dextrose, pantoprazole, sodium chloride flush, sodium chloride, promethazine **OR** ondansetron, polyethylene glycol, acetaminophen **OR** acetaminophen, perflutren lipid microspheres, benzonatate, dextromethorphan-guaiFENesin      Assessment:     Hx HTN, AS  AVR 2013 (Giuliano Pryor), CAD, nephrolithiasis  Hx E coli bacteremia, 3/2013  Hx Streptococcal salivarius bacteremia 2019  Hx Ps aeruginosa bacteremia 3/31/20    Admit 21 - fever, rigor / shaking chill (acute onset, no other sx)  Pseudomonas aeruginosa bacteremia - in setting bioprosthetic AVR    Plan:     Cont meropenem, ciprofloxacin for now  Await results BRAYAN - scheduled for later today    If BRAYAN, neg, rx with ciprofloxacin x 2 weeks and then monitor    Discussed with pt   Camilo Bautista MD

## 2021-04-20 NOTE — PROGRESS NOTES
Progress Note    Admit Date: 4/16/2021  Diet: DIET GENERAL;    CC: fever/chills    Interval history: Patient complained of SOB overnight on 2L O2. She noted she felt her ankles were more swollen than previously. Her IV fluids were stopped, CXR ordered, and albuterol nebs ordered. Patient felt better after sitting up. This AM the patient is not requiring any supplemental O2. She denies CP, SOB, fevers, abdominal pain. She explains that she had her prostehetic valve placed in 2013, but has been having \"septic blood infections\" since the 1990s. HPI: Con Miller is an 80 y.o. female with history of aortic valve replacement with bioprosthetic valve (2013), moderate aortic insufficiency, GERD, MI in 2007, hypertension, and previous history of pseudomonas bacteremia (3/2020) and previously strep (5/2019) who presents from home with fever and chills of 3 day duration. The patient was working outside in the yard this morning and when she went inside she noticed that she began chilling and then developed rigors and felt that she needed to lay down in bed to cover herself up to keep warm. She reported subjective fevers at home and stated she felt similar to how she felt when she had her prior bloodstream infections. She called 911 to have her brought to the hospital because she was still feeling the rigors. She states that she has had bloodstream infections in the past but that a source was never identified. She is not complaining of any chest pain or shortness of breath, has no abdominal pain, dysuria, or pain anywhere or headache neck pain or skin infections.  She did have her Covid vaccination series, last one March 11.      In the ED she was given a dose of vancomycin and cefepime. Her prior cultures were reviewed and she grew Streptococcus salivarius in 2019 and Pseudomonas aeruginosa in 2020 (resistant to aztreonam, cefepime, ceftazidime, and zosyn, sensitive to ciprofloxacin and merrem).  Temperature on 1 set of blood cultures positive for pseudomonas. TTE 4/19: There are small lesions on both the mitral and aortic valves suspicious for vegetation(s). - f/u blood cultures (2 sets of 2), fungal cx, MRSA swab  - consult cards- possible BRAYAN when breathing status is optimized  - on cipro 400 mg IV BID + IV Meropenem x 1 gm Q 8 hrs  - ID consulted - appreciate recs  - CT surgery consulted - appreciate recs    Acute on chronic G2DD  Echo 4/19: EF~ 50-55%. G2DD. Thickened MV leaflets. Mitral annular calcification is present. Moderate AR. Mild to moderate TR. IVC size is dilated (>2.1 cm) but collapses > 50% with respiration consistent with elevated RA pressure (8 mmHg). There are small lesions on both the mitral and aortic valves suspicious for vegetation(s). - CXR 4/20: pulmonary vascular congestion and mild interstitial pulmonary edema.     - stopped IV fluids  - 20 IV lasix x1    NAGMA - resolved    Chronic:  HTN - continue home norvasc 2.5 mg PO qD  HLD - continue home pravastatin 40 mg PO qD    Code Status: Limited  FEN:NPO   PPX: Lovenox, SCDs  DISPO: [facility]      Nancy Bach MD, PGY-1  04/20/21  10:28 AM    This patient has been staffed and discussed with Vicente Deluca MD.     Addendum to Resident H& P/Progress note:  I have personally seen,examined and evaluated the patient. I have reviewed the current history, physical findings, labs and assessment and plan and agree with note as documented by resident MD ( )  Discussed the patient's condition and BRAYAN / treatment with Dr. Greer Oreilly, cardiology. Treatment with Lasix has been initiated for volume overload.     Vicente Deluca MD, FACP

## 2021-04-20 NOTE — PROGRESS NOTES
[facility] Daily Progress Note        Admit Date:  4/16/2021    Reason for Consultation/Chief Complaint: Fever, Chills, Fatigue     Subjective:  Ms. Vincent Herring Was seen at bedside this morning. She had worsening shortness of breath overnight and required 2 liters. This morning she is still slightly short of breath but with improvement. She denies any chest pain. No light headedness. No Palpitations. Chest xray done last night showed vascular congestion. Objective:   BP (!) 157/45   Pulse 71   Temp 97.5 °F (36.4 °C) (Oral)   Resp 22   Ht 5' 4\" (1.626 m)   Wt 166 lb 12.8 oz (75.7 kg)   SpO2 90%   BMI 28.63 kg/m²       Intake/Output Summary (Last 24 hours) at 4/20/2021 1053  Last data filed at 4/20/2021 1046  Gross per 24 hour   Intake 840 ml   Output 1675 ml   Net -835 ml       TELEMETRY: Sinus     Physical Exam:  General:  Awake, alert, NAD  Skin:  Warm and dry  Neck:  JVP +  Chest:  Clear to auscultation, respiration normal  Cardiovascular:  RRR S1S2  Abdomen:  Soft, Non tender, BS present in all 4 quadrants.   Extremities:  + edema    Medications:    furosemide  20 mg Intravenous Once    furosemide  20 mg Intravenous BID    meropenem  1,000 mg Intravenous Q8H    insulin lispro  0-6 Units Subcutaneous TID WC    insulin lispro  0-3 Units Subcutaneous Nightly    aspirin  81 mg Oral Daily    amLODIPine  2.5 mg Oral Daily    magnesium oxide  400 mg Oral Daily    pravastatin  40 mg Oral Daily    sodium chloride flush  5-40 mL Intravenous 2 times per day    [Held by provider] enoxaparin  40 mg Subcutaneous Daily    ciprofloxacin  400 mg Intravenous Q12H      dextrose      sodium chloride       albuterol, glucose, dextrose, glucagon (rDNA), dextrose, pantoprazole, sodium chloride flush, sodium chloride, promethazine **OR** ondansetron, polyethylene glycol, acetaminophen **OR** acetaminophen, perflutren lipid microspheres, benzonatate, dextromethorphan-guaiFENesin    Lab Data:  CBC:   Recent Labs     04/18/21  0230 04/19/21  0428 04/20/21  0440   WBC 6.5 6.4 6.0   HGB 12.6 12.5 13.1   HCT 36.8 36.5 38.5   MCV 94.9 95.0 94.3   PLT 47* 45* 60*     BMP:   Recent Labs     04/18/21  0230 04/19/21  0428 04/20/21  0440   * 135* 140   K 4.8 4.0 4.2    106 108   CO2 17* 23 23   BUN 17 13 10   CREATININE 0.8 0.6 <0.5*     LIVER PROFILE: No results for input(s): AST, ALT, LIPASE, BILIDIR, BILITOT, ALKPHOS in the last 72 hours. Invalid input(s): AMYLASE,  ALB  PT/INR: No results for input(s): PROTIME, INR in the last 72 hours. APTT: No results for input(s): APTT in the last 72 hours. BNP:  No results for input(s): BNP in the last 72 hours. IMAGING: Findings are suggestive of low-grade congestive heart failure, with   pulmonary vascular congestion and mild interstitial pulmonary edema.     Patchy atelectasis present, but no clear signs of an infiltrate    Assessment & Plan    Bacteremia with history of bioprosthetic valve   - AVR 7/2013 bioprosthetic   - History of Ps aeruginosa bacteremia 3/31/20 & Currently Positive Blood cultures  - Antibiotics per Infectious Disease   - Echo showed no conclusive evidence of Vegatation / source of bacteremia, given high level of suspicion will get a BRAYAN once breathing has stabilized   - Will consult CTS (Dr. Antoine ) to discuss possible options with patient regarding AVR   - Continue Aspirin, continue Antibiotics     Acute Hypoxic respiratory failure 2/2 Diastolic CHF   - Secondary to volume overload   - IVC dilated on echo, but does collapse   - improved symptoms with Lasix  - Will trial a few doses of Lasix      Hypertension   - Currently on Amlodipine      Arabella Soriano, PGY 3   Will Staff with Dr. Omar Feliciano       Staff Note      Patient seen and evaluated with the medical resident. I was physically present during the critical portions of the service when performed by the resident including the assessment and management of the patient.   Will reassess this PM for possible BRAYAN but pt seems to be volume overloaded on CXR taken last night when she became SOB. I have ordered additional lasix and when comfortable will get BRAYAN to guide overall therapy but TTE is suspicious for AV and possibly MV vegetation. I agree with the findings and plans as described.

## 2021-04-20 NOTE — CARE COORDINATION
Case Management Assessment           Daily Note                 Date/ Time of Note: 4/20/2021 12:41 PM         Note completed by: Cecilio Mix    Patient Name: Dk Hobbs  YOB: 1935    Diagnosis:Fever and chills [R50.9]  Patient Admission Status: Inpatient    Date of Admission:4/16/2021  3:37 PM Length of Stay: 4 GLOS:      Current Plan of Care: CT surgery consult, ID consult, plans for BRAYAN for possible AV or MV vegitation, IV abx, IV lasix  ________________________________________________________________________________________  PT AM-PAC: 20 / 24 per last evaluation on: 4.19    OT AM-PAC: 21 / 24 per last evaluation on: 4.19    DME Needs for discharge: n/a  ________________________________________________________________________________________  Discharge Plan: Home    Tentative discharge date: TBD    Current barriers to discharge: medical clearance      ________________________________________________________________________________________  Case Management Notes:  Patient is from home, independent pta. No CM needs at this time. CM will continue to follow as cultures are pending, may need IV abx when discharge. Ada and her family were provided with choice of provider; she and her family are in agreement with the discharge plan.     Care Transition Patient: Yes    Cecilio Mix RN  [facility]  Case Management Department  Ph: [phone]  Fax: [phone]

## 2021-04-20 NOTE — PROGRESS NOTES
Pt began to experience dyspnea/tachypnea/orthopnea;  Stated, \"I'm short of breath and feel like I'm choking on mucous. \"    RR ranging 20-32, Sp02 92% on 2L, faint expiratory wheezing heard in upper airway with fine crackles auscultated on LLL. Pt sat up in chair and IV fluids paused. Pt stated that their feet appeared to be \"swelling up\" trace non-pitting edema noted in feet; no edema above ankles. MD made aware of pt status, CXR and PRN albuterol nebs ordered. Fluid rate decreased from 100 to 75    Pt stated they felt much better after sitting up; pt returned to bed and was able to rest with rr >10 thereafter.

## 2021-04-21 ENCOUNTER — TELEPHONE (OUTPATIENT)
Dept: FAMILY MEDICINE CLINIC | Age: 86
End: 2021-04-21

## 2021-04-21 LAB
ANION GAP SERPL CALCULATED.3IONS-SCNC: 9 MMOL/L (ref 3–16)
BASOPHILS ABSOLUTE: 0 K/UL (ref 0–0.2)
BASOPHILS RELATIVE PERCENT: 0.5 %
BLOOD CULTURE, ROUTINE: NORMAL
BUN BLDV-MCNC: 9 MG/DL (ref 7–20)
CALCIUM SERPL-MCNC: 8.2 MG/DL (ref 8.3–10.6)
CHLORIDE BLD-SCNC: 106 MMOL/L (ref 99–110)
CO2: 25 MMOL/L (ref 21–32)
CREAT SERPL-MCNC: <0.5 MG/DL (ref 0.6–1.2)
CULTURE, BLOOD 2: NORMAL
EOSINOPHILS ABSOLUTE: 0.2 K/UL (ref 0–0.6)
EOSINOPHILS RELATIVE PERCENT: 3.8 %
GFR AFRICAN AMERICAN: >60
GFR NON-AFRICAN AMERICAN: >60
GLUCOSE BLD-MCNC: 106 MG/DL (ref 70–99)
GLUCOSE BLD-MCNC: 115 MG/DL (ref 70–99)
GLUCOSE BLD-MCNC: 123 MG/DL (ref 70–99)
HCT VFR BLD CALC: 37.6 % (ref 36–48)
HEMOGLOBIN: 13.1 G/DL (ref 12–16)
LYMPHOCYTES ABSOLUTE: 1.2 K/UL (ref 1–5.1)
LYMPHOCYTES RELATIVE PERCENT: 18.7 %
MCH RBC QN AUTO: 32.2 PG (ref 26–34)
MCHC RBC AUTO-ENTMCNC: 34.7 G/DL (ref 31–36)
MCV RBC AUTO: 92.7 FL (ref 80–100)
MONOCYTES ABSOLUTE: 0.6 K/UL (ref 0–1.3)
MONOCYTES RELATIVE PERCENT: 10.2 %
NEUTROPHILS ABSOLUTE: 4.2 K/UL (ref 1.7–7.7)
NEUTROPHILS RELATIVE PERCENT: 66.8 %
PDW BLD-RTO: 13.8 % (ref 12.4–15.4)
PERFORMED ON: ABNORMAL
PERFORMED ON: ABNORMAL
PLATELET # BLD: 75 K/UL (ref 135–450)
PMV BLD AUTO: 10.1 FL (ref 5–10.5)
POTASSIUM REFLEX MAGNESIUM: 3.8 MMOL/L (ref 3.5–5.1)
RBC # BLD: 4.06 M/UL (ref 4–5.2)
SODIUM BLD-SCNC: 140 MMOL/L (ref 136–145)
WBC # BLD: 6.2 K/UL (ref 4–11)

## 2021-04-21 PROCEDURE — 85025 COMPLETE CBC W/AUTO DIFF WBC: CPT

## 2021-04-21 PROCEDURE — 97530 THERAPEUTIC ACTIVITIES: CPT

## 2021-04-21 PROCEDURE — 2580000003 HC RX 258: Performed by: INTERNAL MEDICINE

## 2021-04-21 PROCEDURE — 80048 BASIC METABOLIC PNL TOTAL CA: CPT

## 2021-04-21 PROCEDURE — 2060000000 HC ICU INTERMEDIATE R&B

## 2021-04-21 PROCEDURE — 2580000003 HC RX 258: Performed by: STUDENT IN AN ORGANIZED HEALTH CARE EDUCATION/TRAINING PROGRAM

## 2021-04-21 PROCEDURE — 99232 SBSQ HOSP IP/OBS MODERATE 35: CPT | Performed by: INTERNAL MEDICINE

## 2021-04-21 PROCEDURE — 99232 SBSQ HOSP IP/OBS MODERATE 35: CPT | Performed by: HOSPITALIST

## 2021-04-21 PROCEDURE — 97116 GAIT TRAINING THERAPY: CPT

## 2021-04-21 PROCEDURE — 6360000002 HC RX W HCPCS: Performed by: STUDENT IN AN ORGANIZED HEALTH CARE EDUCATION/TRAINING PROGRAM

## 2021-04-21 PROCEDURE — 36415 COLL VENOUS BLD VENIPUNCTURE: CPT

## 2021-04-21 PROCEDURE — 6360000002 HC RX W HCPCS: Performed by: INTERNAL MEDICINE

## 2021-04-21 PROCEDURE — 99233 SBSQ HOSP IP/OBS HIGH 50: CPT | Performed by: INTERNAL MEDICINE

## 2021-04-21 RX ADMIN — MEROPENEM 1000 MG: 1 INJECTION, POWDER, FOR SOLUTION INTRAVENOUS at 16:59

## 2021-04-21 RX ADMIN — FUROSEMIDE 20 MG: 10 INJECTION, SOLUTION INTRAMUSCULAR; INTRAVENOUS at 10:00

## 2021-04-21 RX ADMIN — MEROPENEM 1000 MG: 1 INJECTION, POWDER, FOR SOLUTION INTRAVENOUS at 01:05

## 2021-04-21 RX ADMIN — MEROPENEM 1000 MG: 1 INJECTION, POWDER, FOR SOLUTION INTRAVENOUS at 10:00

## 2021-04-21 RX ADMIN — Medication 5 ML: at 21:51

## 2021-04-21 RX ADMIN — FUROSEMIDE 20 MG: 10 INJECTION, SOLUTION INTRAMUSCULAR; INTRAVENOUS at 16:59

## 2021-04-21 RX ADMIN — CIPROFLOXACIN 400 MG: 2 INJECTION, SOLUTION INTRAVENOUS at 14:41

## 2021-04-21 ASSESSMENT — PAIN SCALES - GENERAL
PAINLEVEL_OUTOF10: 0

## 2021-04-21 NOTE — PROGRESS NOTES
Physical Therapy  Facility/Department: [place] [address] [facility]  Daily Treatment Note/discharge note    NAME: Gianni Sumner  : 1935  MRN: [id number]    Date of Service: 2021    Discharge Recommendations:Rosalinda Long scored a 23/24 on the AM-PAC short mobility form. Current research shows that an AM-PAC score of 18 or greater is typically associated with a discharge to the patient's home setting. Based on the patient's AM-PAC score and their current functional mobility deficits, it is recommended that the patient have 2-3 sessions per week of Physical Therapy at d/c to increase the patient's independence. At this time, this patient demonstrates the endurance and safety to discharge home with  (home  services) and a follow up treatment frequency of 2-3x/wk. Please see assessment section for further patient specific details. PT Equipment Recommendations  Equipment Needed: No    Assessment   Assessment: Pt demo slight improved mobility but poor endurance. Pt states she has not eaten in 2 days (NPO for test; no appetite). Pt also reports she has been walking in room and sitting in chair since admit. Pt demo mobility /endurance below her reported baseline of independent at home without AD. Pt plans to return home at discharge. If home, recommend 24 hour supervision initially, home PT safety eval. No further acute PT needs. REcommend nursing continue to encourage ambulation PRN and often with supervision. Treatment Diagnosis: mobility impairment due to fever/chills  Patient Education: Pt educated on PT role, importance of OOB mobility, need to call for assist to get up and she verbalized understanding. REQUIRES PT FOLLOW UP: No  Activity Tolerance  Activity Tolerance: Patient Tolerated treatment well;Patient limited by endurance     Patient Diagnosis(es): The encounter diagnosis was Septicemia ([facility] [address]      has a past medical history of Allergic rhinitis, cause unspecified, Allergic urticaria, Angioneurotic edema Subjective  Subjective: Pt found supine in bed and agreeable to PT. \" I've been getting up and walking around by myself. \"  Pain Screening  Patient Currently in Pain: Denies         Orientation  Orientation  Overall Orientation Status: Within Functional Limits       Objective   Bed mobility  Supine to Sit: Modified independent  Sit to Supine: Modified independent  Scooting: Modified independent     Transfers  Sit to Stand: Modified independent(x2 trials)  Stand to sit: Modified independent(x2 trials)     Ambulation 1  Device: No Device  Assistance: Modified Independent  Quality of Gait: slow, steady gait but pt fatigued quickly requiring one standing rest break; no overt LOB noted  Distance: 120 ft, 20 ft x2  Stairs/Curb  Stairs? : (pt declined this am)                              AM-PAC Score  AM-PAC Inpatient Mobility Raw Score : 23 (04/21/21 1109)  AM-PAC Inpatient T-Scale Score : 56.93 (04/21/21 1109)  Mobility Inpatient CMS 0-100% Score: 11.2 (04/21/21 1109)  Mobility Inpatient CMS G-Code Modifier : CI (04/21/21 1109)          Goals  Short term goals  Time Frame for Short term goals: discharge  Short term goal 1: sit to/from stand mod I   met 4/21  Short term goal 2: ambulate 150 ft with or without AD mod I   partially met 4/21  Short term goal 3: up/down 3 steps with rail mod I  pt declined 4/21  Patient Goals   Patient goals : return home    Plan  signed off for acute PT    Safety Devices  Type of devices: Nurse notified, Bed alarm in place, Call light within reach, Left in bed(pt declined up in chair due to fatigue)     Therapy Time   Individual Concurrent Group Co-treatment   Time In 1045         Time Out 1110         Minutes 25           Timed Code Treatment Minutes:25       Total Treatment Minutes:  [address], PT

## 2021-04-21 NOTE — PLAN OF CARE
Problem: Gas Exchange - Impaired:  Goal: Levels of oxygenation will improve  Description: Levels of oxygenation will improve  Outcome: Met This Shift  Note: On room air and denies any shortness of Breath at this time, with occasional non productive cough and PRN cough medicine being administered. Will continue to monitor. Problem: Infection, Septic Shock:  Goal: Will show no infection signs and symptoms  Description: Will show no infection signs and symptoms  Outcome: Ongoing  Note: Patient remains on 2 IV ATBx cipro and merem per ID order. VS stable at this time and denies any respiratory distress. Currently room air still. Will continue to monitor.

## 2021-04-21 NOTE — PROGRESS NOTES
[facility] Daily Progress Note        Admit Date:  4/16/2021    Reason for Consultation/Chief Complaint: Fever, Chills, Fatigue     Subjective:  Ms. Erwin Saleem Was seen at bedside this morning. She was doing much better this morning. She denies any chest pain. Denies any shortness of breath. No light headedness. No Palpitations. Improved with diuretics. Objective:   BP (!) 144/100   Pulse 70   Temp 98.1 °F (36.7 °C) (Oral)   Resp 18   Ht 5' 4\" (1.626 m)   Wt 178 lb 5.6 oz (80.9 kg)   SpO2 93%   BMI 30.61 kg/m²       Intake/Output Summary (Last 24 hours) at 4/21/2021 1236  Last data filed at 4/21/2021 1004  Gross per 24 hour   Intake 0 ml   Output 2450 ml   Net -2450 ml       TELEMETRY: Sinus     Physical Exam:  General:  Awake, alert, NAD  Skin:  Warm and dry  Neck:  JVP +  Chest:  Clear to auscultation, respiration normal  Cardiovascular:  RRR S1S2  Abdomen:  Soft, Non tender, BS present in all 4 quadrants.   Extremities:  + edema    Medications:    furosemide  20 mg Intravenous Once    furosemide  20 mg Intravenous BID    meropenem  1,000 mg Intravenous Q8H    insulin lispro  0-6 Units Subcutaneous TID WC    insulin lispro  0-3 Units Subcutaneous Nightly    aspirin  81 mg Oral Daily    amLODIPine  2.5 mg Oral Daily    magnesium oxide  400 mg Oral Daily    pravastatin  40 mg Oral Daily    sodium chloride flush  5-40 mL Intravenous 2 times per day    enoxaparin  40 mg Subcutaneous Daily    ciprofloxacin  400 mg Intravenous Q12H      dextrose      sodium chloride       albuterol, glucose, dextrose, glucagon (rDNA), dextrose, pantoprazole, sodium chloride flush, sodium chloride, promethazine **OR** ondansetron, polyethylene glycol, acetaminophen **OR** acetaminophen, perflutren lipid microspheres, benzonatate, dextromethorphan-guaiFENesin    Lab Data:  CBC:   Recent Labs     04/19/21  0428 04/20/21  0440 04/21/21  0421   WBC 6.4 6.0 6.2   HGB 12.5 13.1 13.1   HCT 36.5 38.5 37.6   MCV 95.0 94.3 92.7   PLT 45* 60* 75*     BMP:   Recent Labs     04/19/21  0428 04/20/21  0440 04/21/21  0421   * 140 140   K 4.0 4.2 3.8    108 106   CO2 23 23 25   BUN 13 10 9   CREATININE 0.6 <0.5* <0.5*     LIVER PROFILE: No results for input(s): AST, ALT, LIPASE, BILIDIR, BILITOT, ALKPHOS in the last 72 hours. Invalid input(s): AMYLASE,  ALB  PT/INR: No results for input(s): PROTIME, INR in the last 72 hours. APTT: No results for input(s): APTT in the last 72 hours. BNP:  No results for input(s): BNP in the last 72 hours. IMAGING: Findings are suggestive of low-grade congestive heart failure, with   pulmonary vascular congestion and mild interstitial pulmonary edema.     Patchy atelectasis present, but no clear signs of an infiltrate    Assessment & Plan    Bacteremia with history of bioprosthetic valve   - AVR 7/2013 bioprosthetic   - History of Ps aeruginosa bacteremia 3/31/20 & Currently Positive Blood cultures  - Antibiotics per Infectious Disease   - Echo showed no conclusive evidence of Vegatation / source of bacteremia,   - BRAYAN scheduled for tomorrow   - CTS (Dr. Amanda Jordan) on board   - Continue Aspirin, continue Antibiotics     Acute Hypoxic respiratory failure 2/2 Diastolic CHF   - Secondary to volume overload   - IVC dilated on echo, but does collapse   - improved symptoms with Lasix     Hypertension   - Currently on Amlodipine      Cecelia Thorne, PGY 3   Will Staff with Dr. Grant Espinal       Staff Note      Patient seen and evaluated with the medical resident. I was physically present during the critical portions of the service when performed by the resident including the assessment and management of the patient. Acute diastolic HF from volume from needed antibiotics and treatment for sepsis and not unexpected. This is greatly improved today. Plan for BRAYAN tomorrow if clinically stable. I agree with the findings and plans as described.

## 2021-04-21 NOTE — PROGRESS NOTES
ECHO:  4/16 TTE   Summary   Left ventricular cavity size is normal. There is mild to moderate concentric   left ventricular hypertrophy. Overall left ventricular systolic function   appears normal with an ejection fraction of 50-55%. No regional wall motion   abnormalities are noted. Diastolic filling parameters suggest grade II   diastolic dysfunction. The mitral valve leaflets are slightly thickened with   normal leaflet mobility. Mitral annular calcification is present. The peak   mitral valve velocity is 1.78 m/s and the mean pressure gradient is 4 mmHg. A bioprosthetic artificial aortic valve appears well seated with a maximum   velocity of 4.12m/s and a mean gradient of 41mmHg. Moderate aortic   regurgitation. Mild to moderate tricuspid regurgitation. Estimated pulmonary   artery systolic pressure is at 43 mmHg assuming a right atrial pressure of 8   mmHg. The right ventricle is normal in size. TAPSE measures 1.78 cm and the   RVS velocity measures 8.7cm/s. The left atrium is at the upper limits of   normal in size. IVC size is dilated (>2.1 cm) but collapses > 50% with   respiration consistent with elevated RA pressure (8 mmHg). There are small lesions on both the mitral and aortic valves suspicious for   vegetation(s).       Scheduled Meds:   furosemide  20 mg Intravenous Once    meropenem  1,000 mg Intravenous Q8H    insulin lispro  0-6 Units Subcutaneous TID WC    insulin lispro  0-3 Units Subcutaneous Nightly    aspirin  81 mg Oral Daily    amLODIPine  2.5 mg Oral Daily    magnesium oxide  400 mg Oral Daily    pravastatin  40 mg Oral Daily    sodium chloride flush  5-40 mL Intravenous 2 times per day    enoxaparin  40 mg Subcutaneous Daily    ciprofloxacin  400 mg Intravenous Q12H       Continuous Infusions:   dextrose      sodium chloride         PRN Meds:  albuterol, glucose, dextrose, glucagon (rDNA), dextrose, pantoprazole, sodium chloride flush, sodium chloride, promethazine **OR** ondansetron, polyethylene glycol, acetaminophen **OR** acetaminophen, perflutren lipid microspheres, benzonatate, dextromethorphan-guaiFENesin      Assessment:     Hx HTN, AS  AVR 7/2013 (Allyson Carvalho), CAD, nephrolithiasis  Hx E coli bacteremia, 3/2013  Hx Streptococcal salivarius bacteremia 5/31/2019  Hx Ps aeruginosa bacteremia 3/31/20    Admit 4/16/21 - fever, rigor / shaking chill (acute onset, no other sx)  Pseudomonas aeruginosa bacteremia - in setting bioprosthetic AVR, abnormal TTE (\"suspicious\" lesions on MV, AoV)    Plan:     Cont meropenem, ciprofloxacin for now  Await results BRAYAN     If BRAYAN, neg, rx with ciprofloxacin x 2 weeks and then monitor  IF BRAYAN pos, PICC / 2 agents, longer course     Discussed with pt.  RN  Lew Bardales MD

## 2021-04-21 NOTE — TELEPHONE ENCOUNTER
----- Message from Bri Piedra sent at 4/21/2021  3:54 PM EDT -----  Subject: Message to Provider    QUESTIONS  Information for Provider? [facility]   [address] and wanted to inform Dr. Satya Johnson.  ---------------------------------------------------------------------------  --------------  Vale PADILLA  What is the best way for the office to contact you? OK to leave message on   voicemail  Preferred Call Back Phone Number? [phone]  ---------------------------------------------------------------------------  --------------  SCRIPT ANSWERS  Relationship to Patient? Other  Representative Name? Dasha Lin  Is the Representative on the appropriate HIPAA document in Epic?  Yes

## 2021-04-21 NOTE — PLAN OF CARE
Problem: Infection, Septic Shock:  Goal: Will show no infection signs and symptoms  Description: Will show no infection signs and symptoms  Outcome: Ongoing  Note: Pt was afebrile throughout shift and her vitals signs remained within her normal limits. IV ABX throughout the shift.

## 2021-04-21 NOTE — PROGRESS NOTES
Physician Progress Note      Birgida Johnson  CSN #:                  [id number]  :                       1935  ADMIT DATE:       2021 3:37 PM  [address] [place] DATE:  RESPONDING  PROVIDER #:        Adin Salas MD          QUERY TEXT:    Dear Provider,    Pt  with  acute on chr dCHF . Noted   cardiology consultant's   documentation of \" Acute Hypoxic respiratory failure 2/2 Diastolic CHF \". If   possible, please document  if you are in agreement with the cardiology   consultants documentation of acute hypoxic respiratory failure,as such: The medical record reflects the following:  Risk Factors: Acute on chronic diastolic CHF *  Clinical Indicators:  Nursing note patient developed   dyspnea/tachypnea/orthopnea; Stated, \"I'm short of breath and feel like I'm   choking on mucous  , RR 20-31  Sat 92% on 2L/NC placed for comfort. CXR and   PRN albuterol nebs ordered. Fluid rate decreased from 100 to 75  Treatment:  02 supplementation  Options provided:  -- Acute hypoxic respiratory failure ruled in as evidence by, .  -- Acute hypoxic respiratory failure ruled out  -- Other - I will add my own diagnosis  -- Disagree - Not applicable / Not valid  -- Disagree - Clinically unable to determine / Unknown  -- Refer to Clinical Documentation Reviewer    PROVIDER RESPONSE TEXT:    The diagnosis of acute hypoxic respiratory failure was ruled out.     Query created by: Shirley Lion on 2021 12:24 PM      Electronically signed by:  Adin Salas MD 2021 1:55 PM

## 2021-04-21 NOTE — CARE COORDINATION
Case Management Assessment           Daily Note                 Date/ Time of Note: 4/21/2021 11:47 AM         Note completed by: Ariel Cotton    Patient Name: Elmer Dunn  YOB: 1935    Diagnosis:Fever and chills [R50.9]  Patient Admission Status: Inpatient    Date of Admission:4/16/2021  3:37 PM Length of Stay: 5 GLOS:      Current Plan of Care: awaiting BRAYAN, CT surgery consult, IV abx ID following  ________________________________________________________________________________________  PT AM-PAC: 23 / 24 per last evaluation on: 4.21    OT AM-PAC: 21 / 24 per last evaluation on: 4.21    DME Needs for discharge: n/a  ________________________________________________________________________________________  Discharge Plan: Home    Tentative discharge date: TBD    Current barriers to discharge: medical clearance      ________________________________________________________________________________________  Case Management Notes:  Patient from home, independent pta. CM to follow as pt may need IV abx at discharge. Ada and her family were provided with choice of provider; she and her family are in agreement with the discharge plan.     Care Transition Patient: Yes    Ariel Cotton RN  [facility]  Case Management Department  Ph: [phone]  Fax: [phone]

## 2021-04-21 NOTE — PROGRESS NOTES
Progress Note    Admit Date: 4/16/2021  Diet: Diet NPO, After Midnight    CC: fever/chills    Interval history: No acute events overnight. Patient remains afebrile. This AM the patient is not requiring any supplemental O2. She denies CP, SOB, fevers, abdominal pain. She reports feeling much better. HPI: Tova Miller is an 80 y.o. female with history of aortic valve replacement with bioprosthetic valve (2013), moderate aortic insufficiency, GERD, MI in 2007, hypertension, and previous history of pseudomonas bacteremia (3/2020) and previously strep (5/2019) who presents from home with fever and chills of 3 day duration. The patient was working outside in the yard this morning and when she went inside she noticed that she began chilling and then developed rigors and felt that she needed to lay down in bed to cover herself up to keep warm. She reported subjective fevers at home and stated she felt similar to how she felt when she had her prior bloodstream infections. She called 911 to have her brought to the hospital because she was still feeling the rigors. She states that she has had bloodstream infections in the past but that a source was never identified. She is not complaining of any chest pain or shortness of breath, has no abdominal pain, dysuria, or pain anywhere or headache neck pain or skin infections.  She did have her Covid vaccination series, last one March 11.      In the ED she was given a dose of vancomycin and cefepime. Her prior cultures were reviewed and she grew Streptococcus salivarius in 2019 and Pseudomonas aeruginosa in 2020 (resistant to aztreonam, cefepime, ceftazidime, and zosyn, sensitive to ciprofloxacin and merrem). Temperature on admission was 99.4, however she had taken tylenol at home prior.  \" - taken from admission H&P    Medications:     Scheduled Meds:   furosemide  20 mg Intravenous Once    furosemide  20 mg Intravenous BID    meropenem  1,000 mg Intravenous Q8H    insulin lispro  0-6 Units Subcutaneous TID     insulin lispro  0-3 Units Subcutaneous Nightly    aspirin  81 mg Oral Daily    amLODIPine  2.5 mg Oral Daily    magnesium oxide  400 mg Oral Daily    pravastatin  40 mg Oral Daily    sodium chloride flush  5-40 mL Intravenous 2 times per day    [Held by provider] enoxaparin  40 mg Subcutaneous Daily    ciprofloxacin  400 mg Intravenous Q12H     Continuous Infusions:   dextrose      sodium chloride       PRN Meds:albuterol, glucose, dextrose, glucagon (rDNA), dextrose, pantoprazole, sodium chloride flush, sodium chloride, promethazine **OR** ondansetron, polyethylene glycol, acetaminophen **OR** acetaminophen, perflutren lipid microspheres, benzonatate, dextromethorphan-guaiFENesin    Objective:   Vitals:   T-max:  Patient Vitals for the past 8 hrs:   BP Temp Temp src Pulse Resp SpO2 Weight   04/21/21 0800 (!) 150/51 98.1 °F (36.7 °C) Oral 70 18 93 %    04/21/21 0422 (!) 155/58 98.7 °F (37.1 °C) Oral 69 19 92 % 178 lb 5.6 oz (80.9 kg)       Intake/Output Summary (Last 24 hours) at 4/21/2021 1001  Last data filed at 4/21/2021 0422  Gross per 24 hour   Intake 120 ml   Output 3050 ml   Net -2930 ml       Physical Exam    General appearance:  No apparent distress, appears stated age and cooperative. HEENT:  Normal cephalic,atraumatic without obvious deformity. Pupils equal, round, and reactive to light. Extra ocular muscles intact. Conjunctivae/corneas clear. No caries noted. Neck: Supple, with full range of motion. No jugular venous distention. Trachea midline. Respiratory:  Normal respiratory effort. No rales. Cardiovascular:  Regular rate and rhythm with normal S1/S2. Abdomen: Soft, non-tender, non-distended with normal bowel sounds. Musculoskeletal:  No clubbing, cyanosis or edema bilaterally. Full range of motion without deformity. No septic or swollen appearing joints. Skin: Skin color, texture, turgor normal.  No rashes or lesions.  No boils, pimples or sores. Neurologic:  Neurovascularly intact without any focal sensory/motor deficits. Cranialnerves: II-XII intact, grossly non-focal.  Psychiatric:  Alert and oriented, thought content appropriate,normal insight  Capillary Refill: Brisk,< 3 seconds   Peripheral Pulses: +2 palpable, equal bilaterally   LABS:    CBC:   Recent Labs     04/19/21 0428 04/20/21 0440 04/21/21 0421   WBC 6.4 6.0 6.2   HGB 12.5 13.1 13.1   HCT 36.5 38.5 37.6   PLT 45* 60* 75*   MCV 95.0 94.3 92.7     Renal:    Recent Labs     04/19/21 0428 04/20/21 0440 04/21/21 0421   * 140 140   K 4.0 4.2 3.8    108 106   CO2 23 23 25   BUN 13 10 9   CREATININE 0.6 <0.5* <0.5*   GLUCOSE 126* 131* 115*   CALCIUM 7.4* 7.8* 8.2*   ANIONGAP 6 9 9     -----------------------------------------------------------------  RAD:   XR CHEST PORTABLE   Final Result   Findings are suggestive of low-grade congestive heart failure, with    pulmonary vascular congestion and mild interstitial pulmonary edema. Patchy atelectasis present, but no clear signs of an infiltrate      XR CHEST (2 VW)   Final Result      No acute cardiopulmonary disease identified. Assessment/Plan:   Sepsis 2/2 pseudomonas bacteremia, source uncertain  Hx of Streptococcus salivarius in 2019 and Pseudomonas aeruginosa in 2020 (resistant to aztreonam, cefepime, ceftazidime, and zosyn, sensitive to ciprofloxacin and merrem). S/p sepsis protocol fluids. UA and CXR negative for infection. TTE 4/19: small lesions on both mitral and aortic valves suspicious for vegetation(s). - f/u blood cultures (2 sets of 2), fungal cx, MRSA swab  - consult cards- BRAYAN tomorrow  - on cipro 400 mg IV BID + IV Meropenem x 1 gm Q 8 hrs  - ID consulted - appreciate recs  - CT surgery consulted - appreciate recs    Acute on chronic G2DD  Echo 4/19: EF~ 50-55%. G2DD. Thickened MV leaflets. Mitral annular calcification is present. Moderate AR. Mild to moderate TR.  IVC size is dilated (>2.1 cm) but collapses > 50% with respiration consistent with elevated RA pressure (8 mmHg). There are small lesions on both the mitral and aortic valves suspicious for vegetation(s). - CXR 4/20: pulmonary vascular congestion and mild interstitial pulmonary edema.     - 20 IV lasix bid    NAGMA - resolved    Chronic:  HTN - continue home norvasc 2.5 mg PO qD  HLD - continue home pravastatin 40 mg PO qD    Code Status: Limited  FEN: Carb control ; NPO after midnight  PPX: SCDs, Lovenox  DISPO: [facility]      Davi Warren MD, PGY-1  04/21/21  10:01 AM    This patient has been staffed and discussed with Roya Delgado MD.     Addendum to Resident H& P/Progress note:  I have personally seen,examined and evaluated the patient.  I have reviewed the current history, physical findings, labs and assessment and plan and agree with note as documented by resident MD ( Amber Goode)      Roya Delgado MD, FACP

## 2021-04-22 ENCOUNTER — APPOINTMENT (OUTPATIENT)
Dept: CARDIAC CATH/INVASIVE PROCEDURES | Age: 86
DRG: 871 | End: 2021-04-22
Payer: MEDICARE

## 2021-04-22 LAB
ANION GAP SERPL CALCULATED.3IONS-SCNC: 7 MMOL/L (ref 3–16)
BASOPHILS ABSOLUTE: 0.1 K/UL (ref 0–0.2)
BASOPHILS RELATIVE PERCENT: 2.1 %
BLOOD CULTURE, ROUTINE: NORMAL
BUN BLDV-MCNC: 11 MG/DL (ref 7–20)
CALCIUM SERPL-MCNC: 9.1 MG/DL (ref 8.3–10.6)
CHLORIDE BLD-SCNC: 102 MMOL/L (ref 99–110)
CO2: 28 MMOL/L (ref 21–32)
CREAT SERPL-MCNC: 0.5 MG/DL (ref 0.6–1.2)
CULTURE, BLOOD 2: NORMAL
EOSINOPHILS ABSOLUTE: 0.2 K/UL (ref 0–0.6)
EOSINOPHILS RELATIVE PERCENT: 4 %
GFR AFRICAN AMERICAN: >60
GFR NON-AFRICAN AMERICAN: >60
GLUCOSE BLD-MCNC: 109 MG/DL (ref 70–99)
GLUCOSE BLD-MCNC: 115 MG/DL (ref 70–99)
GLUCOSE BLD-MCNC: 120 MG/DL (ref 70–99)
GLUCOSE BLD-MCNC: 128 MG/DL (ref 70–99)
HCT VFR BLD CALC: 38.6 % (ref 36–48)
HEMOGLOBIN: 13.2 G/DL (ref 12–16)
INR BLD: 1.37 (ref 0.86–1.14)
LV EF: 50 %
LVEF MODALITY: NORMAL
LYMPHOCYTES ABSOLUTE: 1.2 K/UL (ref 1–5.1)
LYMPHOCYTES RELATIVE PERCENT: 19.2 %
MCH RBC QN AUTO: 32.1 PG (ref 26–34)
MCHC RBC AUTO-ENTMCNC: 34.3 G/DL (ref 31–36)
MCV RBC AUTO: 93.6 FL (ref 80–100)
MONOCYTES ABSOLUTE: 0.6 K/UL (ref 0–1.3)
MONOCYTES RELATIVE PERCENT: 10.3 %
NEUTROPHILS ABSOLUTE: 4 K/UL (ref 1.7–7.7)
NEUTROPHILS RELATIVE PERCENT: 64.4 %
PDW BLD-RTO: 13.9 % (ref 12.4–15.4)
PERFORMED ON: ABNORMAL
PLATELET # BLD: 87 K/UL (ref 135–450)
PMV BLD AUTO: 9.4 FL (ref 5–10.5)
POTASSIUM REFLEX MAGNESIUM: 3.6 MMOL/L (ref 3.5–5.1)
PROCALCITONIN: 0.62 NG/ML (ref 0–0.15)
PROTHROMBIN TIME: 15.9 SEC (ref 10–13.2)
RBC # BLD: 4.12 M/UL (ref 4–5.2)
SODIUM BLD-SCNC: 137 MMOL/L (ref 136–145)
WBC # BLD: 6.3 K/UL (ref 4–11)

## 2021-04-22 PROCEDURE — 2580000003 HC RX 258: Performed by: INTERNAL MEDICINE

## 2021-04-22 PROCEDURE — 93320 DOPPLER ECHO COMPLETE: CPT

## 2021-04-22 PROCEDURE — 6360000002 HC RX W HCPCS: Performed by: STUDENT IN AN ORGANIZED HEALTH CARE EDUCATION/TRAINING PROGRAM

## 2021-04-22 PROCEDURE — 36415 COLL VENOUS BLD VENIPUNCTURE: CPT

## 2021-04-22 PROCEDURE — 93312 ECHO TRANSESOPHAGEAL: CPT | Performed by: INTERNAL MEDICINE

## 2021-04-22 PROCEDURE — 93325 DOPPLER ECHO COLOR FLOW MAPG: CPT

## 2021-04-22 PROCEDURE — 80048 BASIC METABOLIC PNL TOTAL CA: CPT

## 2021-04-22 PROCEDURE — 99152 MOD SED SAME PHYS/QHP 5/>YRS: CPT

## 2021-04-22 PROCEDURE — 99233 SBSQ HOSP IP/OBS HIGH 50: CPT | Performed by: THORACIC SURGERY (CARDIOTHORACIC VASCULAR SURGERY)

## 2021-04-22 PROCEDURE — 85025 COMPLETE CBC W/AUTO DIFF WBC: CPT

## 2021-04-22 PROCEDURE — 99232 SBSQ HOSP IP/OBS MODERATE 35: CPT | Performed by: HOSPITALIST

## 2021-04-22 PROCEDURE — 85610 PROTHROMBIN TIME: CPT

## 2021-04-22 PROCEDURE — 6360000002 HC RX W HCPCS: Performed by: INTERNAL MEDICINE

## 2021-04-22 PROCEDURE — 6370000000 HC RX 637 (ALT 250 FOR IP): Performed by: STUDENT IN AN ORGANIZED HEALTH CARE EDUCATION/TRAINING PROGRAM

## 2021-04-22 PROCEDURE — 84145 PROCALCITONIN (PCT): CPT

## 2021-04-22 PROCEDURE — 99233 SBSQ HOSP IP/OBS HIGH 50: CPT | Performed by: INTERNAL MEDICINE

## 2021-04-22 PROCEDURE — 2060000000 HC ICU INTERMEDIATE R&B

## 2021-04-22 PROCEDURE — 93312 ECHO TRANSESOPHAGEAL: CPT

## 2021-04-22 RX ORDER — FUROSEMIDE 10 MG/ML
20 INJECTION INTRAMUSCULAR; INTRAVENOUS 2 TIMES DAILY
Status: DISCONTINUED | OUTPATIENT
Start: 2021-04-22 | End: 2021-04-23 | Stop reason: HOSPADM

## 2021-04-22 RX ORDER — LIDOCAINE HYDROCHLORIDE 10 MG/ML
5 INJECTION, SOLUTION EPIDURAL; INFILTRATION; INTRACAUDAL; PERINEURAL ONCE
Status: COMPLETED | OUTPATIENT
Start: 2021-04-22 | End: 2021-04-23

## 2021-04-22 RX ORDER — SODIUM CHLORIDE 0.9 % (FLUSH) 0.9 %
5-40 SYRINGE (ML) INJECTION EVERY 12 HOURS SCHEDULED
Status: DISCONTINUED | OUTPATIENT
Start: 2021-04-22 | End: 2021-04-23 | Stop reason: HOSPADM

## 2021-04-22 RX ORDER — SODIUM CHLORIDE 9 MG/ML
25 INJECTION, SOLUTION INTRAVENOUS PRN
Status: DISCONTINUED | OUTPATIENT
Start: 2021-04-22 | End: 2021-04-23 | Stop reason: HOSPADM

## 2021-04-22 RX ORDER — SODIUM CHLORIDE 0.9 % (FLUSH) 0.9 %
5-40 SYRINGE (ML) INJECTION PRN
Status: DISCONTINUED | OUTPATIENT
Start: 2021-04-22 | End: 2021-04-23 | Stop reason: HOSPADM

## 2021-04-22 RX ADMIN — BENZONATATE 100 MG: 100 CAPSULE ORAL at 00:20

## 2021-04-22 RX ADMIN — Medication 10 ML: at 23:44

## 2021-04-22 RX ADMIN — MEROPENEM 1000 MG: 1 INJECTION, POWDER, FOR SOLUTION INTRAVENOUS at 17:43

## 2021-04-22 RX ADMIN — MEROPENEM 1000 MG: 1 INJECTION, POWDER, FOR SOLUTION INTRAVENOUS at 01:32

## 2021-04-22 RX ADMIN — CIPROFLOXACIN 400 MG: 2 INJECTION, SOLUTION INTRAVENOUS at 00:12

## 2021-04-22 ASSESSMENT — PAIN SCALES - GENERAL
PAINLEVEL_OUTOF10: 0

## 2021-04-22 NOTE — PROGRESS NOTES
NUTRITION NOTE   Admission Date: 4/16/2021     Type and Reason for Visit: Initial    NUTRITION RECOMMENDATIONS:   1. PO Diet: Continue CC3 diet. NUTRITION ASSESSMENT:  Pt assessed for LOS x5 days. Pt reports no change in appetite or po intakes. Pt reports she has only been consuming 1 meal/d d/t frequent changes to NPO status for procedures. Pt reports that she has gained weight during adm d/t fluids. Pt reports no nutrition concerns at this time. MALNUTRITION ASSESSMENT  Context of Malnutrition: Acute Illness   Malnutrition Status: No malnutrition    NUTRITION DIAGNOSIS No nutrition diagnosis at this time. NUTRITION INTERVENTION  Food and/or Nutrient Delivery: Continue Current Diet   Nutrition Education/Counseling: No recommendation at this time Coordination of Nutrition Care: Continue to monitor while inpatient     NUTRITION RISK LEVEL: Risk Level: Low        The patient will still be monitored per nutrition standards of care. Consult dietitian if nutrition interventions essential to patient care is needed.      Toby Gamez, [address] [place] [address], [address] [place] [address] Drive:  [phone]  Office:  [phone]

## 2021-04-22 NOTE — PLAN OF CARE
Problem: Infection, Septic Shock:  Goal: Will show no infection signs and symptoms  Description: Will show no infection signs and symptoms  Outcome: Ongoing  Note: Remains on IV antibiotics, plan for BRAYAN today. ID is on board. Afebrile throughout this shift. Problem: Gas Exchange - Impaired:  Goal: Levels of oxygenation will improve  Description: Levels of oxygenation will improve  Outcome: Met This Shift  Note: Patient remains on room air with saturations > 92%, still with occasional non productive cough and gets PRN cough suppressant. Problem: Falls - Risk of:  Goal: Will remain free from falls  Description: Will remain free from falls  Outcome: Met This Shift  Note: Standard safety measures in place, patient ambulates steadily and independently and calls appropriately for any needs. Will continue to monitor.

## 2021-04-22 NOTE — PROGRESS NOTES
Progress Note    Admit Date: 4/16/2021  Diet: Diet NPO, After Midnight Exceptions are: Sips of Water with Meds    CC: fever/chills    Interval history: No acute events overnight. Patient remains afebrile. This AM the patient is not requiring any supplemental O2. She denies CP, SOB, fevers, abdominal pain. She reports feeling much better. HPI: Vin Miller is an 80 y.o. female with history of aortic valve replacement with bioprosthetic valve (2013), moderate aortic insufficiency, GERD, MI in 2007, hypertension, and previous history of pseudomonas bacteremia (3/2020) and previously strep (5/2019) who presents from home with fever and chills of 3 day duration. The patient was working outside in the yard this morning and when she went inside she noticed that she began chilling and then developed rigors and felt that she needed to lay down in bed to cover herself up to keep warm. She reported subjective fevers at home and stated she felt similar to how she felt when she had her prior bloodstream infections. She called 911 to have her brought to the hospital because she was still feeling the rigors. She states that she has had bloodstream infections in the past but that a source was never identified. She is not complaining of any chest pain or shortness of breath, has no abdominal pain, dysuria, or pain anywhere or headache neck pain or skin infections.  She did have her Covid vaccination series, last one March 11.      In the ED she was given a dose of vancomycin and cefepime. Her prior cultures were reviewed and she grew Streptococcus salivarius in 2019 and Pseudomonas aeruginosa in 2020 (resistant to aztreonam, cefepime, ceftazidime, and zosyn, sensitive to ciprofloxacin and merrem). Temperature on admission was 99.4, however she had taken tylenol at home prior.  \" - taken from admission H&P    Medications:     Scheduled Meds:   furosemide  20 mg Intravenous Once    meropenem  1,000 mg Intravenous Q8H    insulin lispro  0-6 Units Subcutaneous TID     insulin lispro  0-3 Units Subcutaneous Nightly    aspirin  81 mg Oral Daily    amLODIPine  2.5 mg Oral Daily    magnesium oxide  400 mg Oral Daily    pravastatin  40 mg Oral Daily    sodium chloride flush  5-40 mL Intravenous 2 times per day    enoxaparin  40 mg Subcutaneous Daily    ciprofloxacin  400 mg Intravenous Q12H     Continuous Infusions:   dextrose      sodium chloride       PRN Meds:albuterol, glucose, dextrose, glucagon (rDNA), dextrose, pantoprazole, sodium chloride flush, sodium chloride, promethazine **OR** ondansetron, polyethylene glycol, acetaminophen **OR** acetaminophen, perflutren lipid microspheres, benzonatate, dextromethorphan-guaiFENesin    Objective:   Vitals:   T-max:  Patient Vitals for the past 8 hrs:   BP Temp Temp src Pulse Resp SpO2 Weight   04/22/21 0806 (!) 163/52 98 °F (36.7 °C) Oral 64 16 98 %    04/22/21 0618       173 lb 11.6 oz (78.8 kg)       Intake/Output Summary (Last 24 hours) at 4/22/2021 1322  Last data filed at 4/22/2021 0319  Gross per 24 hour   Intake 120 ml   Output 1600 ml   Net -1480 ml       Physical Exam    General appearance:  No apparent distress, appears stated age and cooperative. HEENT:  Normal cephalic,atraumatic without obvious deformity. Pupils equal, round, and reactive to light. Extra ocular muscles intact. Conjunctivae/corneas clear. No caries noted. Neck: Supple, with full range of motion. No jugular venous distention. Trachea midline. Respiratory:  Normal respiratory effort. No rales. Cardiovascular:  Regular rate and rhythm with normal S1/S2. Abdomen: Soft, non-tender, non-distended with normal bowel sounds. Musculoskeletal:  No clubbing, cyanosis or edema bilaterally. Full range of motion without deformity. No septic or swollen appearing joints. Skin: Skin color, texture, turgor normal.  No rashes or lesions. No boils, pimples or sores.    Neurologic:  Neurovascularly intact without any focal sensory/motor deficits. Cranialnerves: II-XII intact, grossly non-focal.  Psychiatric:  Alert and oriented, thought content appropriate,normal insight  Capillary Refill: Brisk,< 3 seconds   Peripheral Pulses: +2 palpable, equal bilaterally   LABS:    CBC:   Recent Labs     04/20/21 0440 04/21/21 0421 04/22/21  0522   WBC 6.0 6.2 6.3   HGB 13.1 13.1 13.2   HCT 38.5 37.6 38.6   PLT 60* 75* 87*   MCV 94.3 92.7 93.6     Renal:    Recent Labs     04/20/21 0440 04/21/21 0421 04/22/21  0521    140 137   K 4.2 3.8 3.6    106 102   CO2 23 25 28   BUN 10 9 11   CREATININE <0.5* <0.5* 0.5*   GLUCOSE 131* 115* 109*   CALCIUM 7.8* 8.2* 9.1   ANIONGAP 9 9 7     -----------------------------------------------------------------  RAD:   XR CHEST PORTABLE   Final Result   Findings are suggestive of low-grade congestive heart failure, with    pulmonary vascular congestion and mild interstitial pulmonary edema. Patchy atelectasis present, but no clear signs of an infiltrate      XR CHEST (2 VW)   Final Result      No acute cardiopulmonary disease identified. Assessment/Plan:   Sepsis 2/2 pseudomonas bacteremia, source uncertain  Hx of Streptococcus salivarius in 2019 and Pseudomonas aeruginosa in 2020 (resistant to aztreonam, cefepime, ceftazidime, and zosyn, sensitive to ciprofloxacin and merrem). S/p sepsis protocol fluids. UA and CXR negative for infection. TTE 4/19: small lesions on both mitral and aortic valves suspicious for vegetation(s). - f/u blood cultures (2 sets of 2), fungal cx, MRSA swab  - consult cards- BRAYAN today showed \"an echogenic mobile structure seen on the aortic valve and severe aortic valve insufficiency\"  - on cipro 400 mg IV BID + IV Meropenem x 1 gm Q 8 hrs  - ID consulted - appreciate recs  - CT surgery consulted - appreciate recs    Acute on chronic G2DD  Echo 4/19: EF~ 50-55%. G2DD. Thickened MV leaflets. Mitral annular calcification is present. Moderate AR. Mild to moderate TR. IVC size is dilated (>2.1 cm) but collapses > 50% with respiration consistent with elevated RA pressure (8 mmHg). There are small lesions on both the mitral and aortic valves suspicious for vegetation(s). - CXR 4/20: pulmonary vascular congestion and mild interstitial pulmonary edema.     - 20 IV lasix bid    NAGMA - resolved    Chronic:  HTN - continue home norvasc 2.5 mg PO qD  HLD - continue home pravastatin 40 mg PO qD    Code Status: Limited  FEN: Carb control   PPX: SCDs, Lovenox  DISPO: SOTERO Reyes MD, PGY-1  04/22/21  1:22 PM    This patient has been staffed and discussed with Peter Langford MD.     Addendum to Resident H& P/Progress note:  I have personally seen,examined and evaluated the patient. I have reviewed the current history, physical findings, labs and assessment and plan and agree with note as documented by resident MD ( )    The patient had BRAYAN earlier today:  Left ventricular cavity size is normal. There is mild to moderate concentric   left ventricular hypertrophy. Overall left ventricular systolic function   appears normal with an ejection fraction of 50%. No regional wall motion   abnormalities are noted. The mitral valve leaflets are thickened. Mitral   annular calcification is present. Mild mitral regurgitation is present. A   thickened bioprosthetic artificial aortic valve appears well seated. and   there is an echogenic mobile structure seen on the valve. There is severe   aortic valve insufficiency. Mild to moderate tricuspid regurgitation. A   bubble study was performed and showed no evidence of right to left shunting. No intracardiac thrombus was seen.     We will discuss further management with ID specialist, Dr. Yenni Browning MD, [address]

## 2021-04-22 NOTE — PLAN OF CARE
H&P Update    I have reviewed the history and physical and examined the patient and find no relevant changes. I have reviewed with the patient and/or family the risks, benefits, and alternatives to the procedure. Pre-sedation Assessment    Patient:  Get Stephens   :   1935     Intended Procedure: BRAYAN      Witness: Nurses notes reviewed and agreed. Medications reviewed  Allergies: Allergies   Allergen Reactions    Actos [Pioglitazone Hydrochloride]      angioedema     Lisinopril Other (See Comments)     Swelling of tongue    Penicillins Hives and Itching    Lipitor      myalgias    Zocor [Simvastatin] Other (See Comments)     Leg tingling , myalgias    Glucophage [Metformin Hydrochloride]      diarrhea     Mallampati score : 2    Pre-Procedure Assessment/Plan:  ASA 2 - Patient with mild systemic disease with no functional limitations    Level of Sedation Plan:  Moderate sedation    Post Procedure plan: Return to same level of care

## 2021-04-22 NOTE — PROGRESS NOTES
Physician Progress Note      Brigida Johnson  CSN #:                  [id number]  :                       1935  ADMIT DATE:       2021 3:37 PM  [address] [place] DATE:  RESPONDING  PROVIDER #:        Ryan Solano MD          QUERY TEXT:    Dear Provider,    Pt admitted with sepsis and has \"Acute on chronic G2DD\"  documented. If   possible, please document in progress notes and discharge summary further   specificity regarding the type of CHF:    The medical record reflects the following:  Risk Factors: Hypertension, Aortic stenosis s/p AVR, DM  Clinical Indicators:  echo shows LVEF 50-55%, mild to moderate LVH, Grade   II diastolic dysfunction. There are small lesions on both the mitral and   aortic valves suspicious for  vegetation(s). Pro-BNP 5558   CXR shown findings suggestive of low-grade congestive heart failure, with   pulmonary vascular congestion and mild interstitial pulmonary edema  Treatment: IV Lasix, I & O, daily weight  Options provided:  -- Acute on Chronic Diastolic CHF/HFpEF  -- Other - I will add my own diagnosis  -- Disagree - Not applicable / Not valid  -- Disagree - Clinically unable to determine / Unknown  -- Refer to Clinical Documentation Reviewer    PROVIDER RESPONSE TEXT:    This patient is in acute on chronic diastolic CHF/HFpEF.     Query created by: Shirley Lion on 2021 12:11 PM      Electronically signed by:  Ryan Solano MD 2021 2:12 PM

## 2021-04-22 NOTE — CARE COORDINATION
CM spoke with patient at bedside. She has had home IV abx in the past, is comfortable with it if needed at discharge. CM faxed referral to [facility] and spoke with intake. It looks as though pt had [facility] in the past.  No PICC line yet today. If no IV abx needed at d/c, pt would go home no needs.     Mirela Wolfe, RN, BSN,   4th Floor Progressive Care Unit  [phone]

## 2021-04-22 NOTE — PROGRESS NOTES
[address] Daily Progress Note      Admit Date:  4/16/2021    CC:  AVR with possible endocarditis. Subjective:  Ms. Doug Cordova is comfortable but has a cough    Objective:   BP (!) 150/60   Pulse 64   Temp 98.1 °F (36.7 °C) (Oral)   Resp 18   Ht 5' 4\" (1.626 m)   Wt 173 lb 11.6 oz (78.8 kg)   SpO2 96%   BMI 29.82 kg/m²       Intake/Output Summary (Last 24 hours) at 4/22/2021 1724  Last data filed at 4/22/2021 1423  Gross per 24 hour   Intake 120 ml   Output 500 ml   Net -380 ml       TELEMETRY: Sinus,    Physical Exam:  General:  Awake, alert, NAD  Eyes:  EOMI PERRL anicteric  Skin:  Warm and dry. Neck:  JVP normal  Chest:  Diminshed. Cardiovascular:RRR, Normal S1S2  II/VI HS and II/VI D Murmur. No Rub. No Gallops. Abdomen:  Soft NT  + bs  Extremities: No edema  Neuro:  CN II-XII intact. No focal deficits. No weakness. No lateralizing findings. Psych:  Normal thought and affect. MSK:  No Cyanosis nor Clubbing.   Symmetrical strength upper and lower extremities    Medications:    lidocaine 1 % injection  5 mL Intradermal Once    sodium chloride flush  5-40 mL Intravenous 2 times per day    furosemide  20 mg Intravenous Once    meropenem  1,000 mg Intravenous Q8H    insulin lispro  0-6 Units Subcutaneous TID WC    insulin lispro  0-3 Units Subcutaneous Nightly    aspirin  81 mg Oral Daily    amLODIPine  2.5 mg Oral Daily    magnesium oxide  400 mg Oral Daily    pravastatin  40 mg Oral Daily    enoxaparin  40 mg Subcutaneous Daily    ciprofloxacin  400 mg Intravenous Q12H      sodium chloride      dextrose      sodium chloride       sodium chloride flush, sodium chloride, albuterol, glucose, dextrose, glucagon (rDNA), dextrose, pantoprazole, sodium chloride, promethazine **OR** ondansetron, polyethylene glycol, acetaminophen **OR** acetaminophen, perflutren lipid microspheres, benzonatate, dextromethorphan-guaiFENesin    Lab Data:  CBC:   Recent Labs     04/20/21  0440 04/21/21  0421 04/22/21  0522   WBC 6.0 6.2 6.3   HGB 13.1 13.1 13.2   HCT 38.5 37.6 38.6   MCV 94.3 92.7 93.6   PLT 60* 75* 87*     BMP:   Recent Labs     04/20/21  0440 04/21/21  0421 04/22/21  0521    140 137   K 4.2 3.8 3.6    106 102   CO2 23 25 28   BUN 10 9 11   CREATININE <0.5* <0.5* 0.5*         Assessment:  Patient Active Problem List    Diagnosis Date Noted    Nephrolithiasis 05/31/2013     Priority: High    Hypertension      Priority: High    Hypercholesteremia      Priority: High    Coronary atherosclerosis      Priority: High    Asthma      Priority: High    Osteopenia 06/20/2014     Priority: Medium    Vitamin D deficiency      Priority: Medium    Transient cerebral ischemia      Priority: Medium    Peptic ulcer      Priority: Medium    Septicemia ([facility] [address] 03/23/2013     Priority: Low    Diverticulosis of sigmoid colon 01/03/2012     Priority: Low    Benign neoplasm of colon      Priority: Low    Allergic rhinitis      Priority: Low    Internal hemorrhoids      Priority: Low    Carcinoma in situ of cervix uteri      Priority: Low    SOB (shortness of breath)     Hypervolemia     Sepsis due to Pseudomonas species without acute organ dysfunction (HCC)     Metabolic acidosis     Nausea and vomiting     Fever and chills 03/31/2020    Acute bacterial sinusitis 01/03/2020    Hypertension     Pseudomonal bacteremia 05/31/2019    History of aortic valve replacement with bioprosthetic valve 05/31/2019    Syncope and collapse 01/05/2019    Bifascicular block 01/05/2019    Thrombocytopenia ([facility] [address] 01/05/2019    S/P AVR (aortic valve replacement) 05/02/2014       Plan:  1. AI:  Moderate AI at BRAYAN with possible vegetation. I recommend prolonged course of IV ATB guided by blood culture results. I personally discussed these results with Dr. Jay Jay Martin, Dr. Pelon Granados and Dr. Jenny Pena. 2. Acute on chronic D HF:  Restart lasix 20 mg IVP bid. 3. HTN:  Start lisinopril 2.5 mg daily. Core Measures:  · Discharge instructions:   · LVEF documented:   · ACEI for LV dysfunction:   · Smoking Cessation:    Roberta Rome MD 4/22/2021 5:24 PM

## 2021-04-22 NOTE — PROGRESS NOTES
Personally reviewed today's BRAYAN, spoke with and examined this patient. Of note is that she has a nasty, productive cough that she says was a \"light hack\" when she came into the hospital. Her upper teeth are missing. She has a few brave soldiers left in her mandible that are of questionable integrity. These could possibly be the source of her three episodes of sepsis over the last year and a half or so. There is an evanescent, filamentous thing on the atrial surface of I think the anterior leaflet of her mitral valve. She has mild MR. She does have mild to moderate AI within the aortic valve. It looks as though she has a partially torn R coronary leaflet in her [employer] Trifecta valve, but this is difficult to interpret. The short axis view of the aortic valve does not show any mobile vegetation that one would typically see flipping up in down in this view. I have reviewed these findings with Dr. Ian Thomas personally. Her BRAYAN does not definitively suggest the need for surgical intervention, at least not at this time. Because of the concern that the prosthetic valve might be infected, she will need a prolonged course of abx (perhaps 6 to 8 weeks) and possibly chronic suppressive abx. Redo aortic valve surgery would be a challenging event for her or any 80year old no matter how good they look. Pulmonary toilet until her cough has cleared will be helpful. I doubt her few remaining lower teeth have any more utility than sentimental value and she should consider having these removed to prevent them from being a further health risk to her and her artificial heart valve.

## 2021-04-23 VITALS
DIASTOLIC BLOOD PRESSURE: 58 MMHG | SYSTOLIC BLOOD PRESSURE: 162 MMHG | OXYGEN SATURATION: 95 % | BODY MASS INDEX: 29.55 KG/M2 | HEIGHT: 64 IN | WEIGHT: 173.06 LBS | TEMPERATURE: 97.2 F | RESPIRATION RATE: 18 BRPM | HEART RATE: 65 BPM

## 2021-04-23 LAB
ANION GAP SERPL CALCULATED.3IONS-SCNC: 7 MMOL/L (ref 3–16)
BASOPHILS ABSOLUTE: 0 K/UL (ref 0–0.2)
BASOPHILS RELATIVE PERCENT: 0.6 %
BUN BLDV-MCNC: 13 MG/DL (ref 7–20)
CALCIUM SERPL-MCNC: 8.9 MG/DL (ref 8.3–10.6)
CHLORIDE BLD-SCNC: 106 MMOL/L (ref 99–110)
CO2: 27 MMOL/L (ref 21–32)
CREAT SERPL-MCNC: 0.6 MG/DL (ref 0.6–1.2)
EOSINOPHILS ABSOLUTE: 0.2 K/UL (ref 0–0.6)
EOSINOPHILS RELATIVE PERCENT: 3.7 %
GFR AFRICAN AMERICAN: >60
GFR NON-AFRICAN AMERICAN: >60
GLUCOSE BLD-MCNC: 106 MG/DL (ref 70–99)
GLUCOSE BLD-MCNC: 120 MG/DL (ref 70–99)
GLUCOSE BLD-MCNC: 94 MG/DL (ref 70–99)
HCT VFR BLD CALC: 36.2 % (ref 36–48)
HEMOGLOBIN: 12.8 G/DL (ref 12–16)
LYMPHOCYTES ABSOLUTE: 1.1 K/UL (ref 1–5.1)
LYMPHOCYTES RELATIVE PERCENT: 21.3 %
MCH RBC QN AUTO: 33 PG (ref 26–34)
MCHC RBC AUTO-ENTMCNC: 35.5 G/DL (ref 31–36)
MCV RBC AUTO: 92.9 FL (ref 80–100)
MONOCYTES ABSOLUTE: 0.5 K/UL (ref 0–1.3)
MONOCYTES RELATIVE PERCENT: 10.1 %
NEUTROPHILS ABSOLUTE: 3.5 K/UL (ref 1.7–7.7)
NEUTROPHILS RELATIVE PERCENT: 64.3 %
PDW BLD-RTO: 14.2 % (ref 12.4–15.4)
PERFORMED ON: ABNORMAL
PERFORMED ON: NORMAL
PLATELET # BLD: 80 K/UL (ref 135–450)
PMV BLD AUTO: 9.4 FL (ref 5–10.5)
POTASSIUM REFLEX MAGNESIUM: 4.2 MMOL/L (ref 3.5–5.1)
RBC # BLD: 3.89 M/UL (ref 4–5.2)
SODIUM BLD-SCNC: 140 MMOL/L (ref 136–145)
WBC # BLD: 5.4 K/UL (ref 4–11)

## 2021-04-23 PROCEDURE — 85025 COMPLETE CBC W/AUTO DIFF WBC: CPT

## 2021-04-23 PROCEDURE — 99239 HOSP IP/OBS DSCHRG MGMT >30: CPT | Performed by: HOSPITALIST

## 2021-04-23 PROCEDURE — 2580000003 HC RX 258: Performed by: INTERNAL MEDICINE

## 2021-04-23 PROCEDURE — 02HV33Z INSERTION OF INFUSION DEVICE INTO SUPERIOR VENA CAVA, PERCUTANEOUS APPROACH: ICD-10-PCS | Performed by: INTERNAL MEDICINE

## 2021-04-23 PROCEDURE — 6360000002 HC RX W HCPCS: Performed by: STUDENT IN AN ORGANIZED HEALTH CARE EDUCATION/TRAINING PROGRAM

## 2021-04-23 PROCEDURE — 6360000002 HC RX W HCPCS: Performed by: INTERNAL MEDICINE

## 2021-04-23 PROCEDURE — 36569 INSJ PICC 5 YR+ W/O IMAGING: CPT

## 2021-04-23 PROCEDURE — C1751 CATH, INF, PER/CENT/MIDLINE: HCPCS

## 2021-04-23 PROCEDURE — 2500000003 HC RX 250 WO HCPCS: Performed by: INTERNAL MEDICINE

## 2021-04-23 PROCEDURE — 36415 COLL VENOUS BLD VENIPUNCTURE: CPT

## 2021-04-23 PROCEDURE — 80048 BASIC METABOLIC PNL TOTAL CA: CPT

## 2021-04-23 PROCEDURE — 6370000000 HC RX 637 (ALT 250 FOR IP): Performed by: STUDENT IN AN ORGANIZED HEALTH CARE EDUCATION/TRAINING PROGRAM

## 2021-04-23 PROCEDURE — 99233 SBSQ HOSP IP/OBS HIGH 50: CPT | Performed by: INTERNAL MEDICINE

## 2021-04-23 RX ORDER — FUROSEMIDE 20 MG/1
20 TABLET ORAL DAILY
Qty: 60 TABLET | Refills: 3 | Status: SHIPPED | OUTPATIENT
Start: 2021-04-23 | End: 2021-05-04

## 2021-04-23 RX ORDER — CIPROFLOXACIN 500 MG/1
500 TABLET, FILM COATED ORAL 2 TIMES DAILY
Qty: 52 TABLET | Refills: 0 | Status: SHIPPED | OUTPATIENT
Start: 2021-04-23 | End: 2021-05-20 | Stop reason: SDUPTHER

## 2021-04-23 RX ORDER — BENZONATATE 100 MG/1
100 CAPSULE ORAL 3 TIMES DAILY PRN
Qty: 21 CAPSULE | Refills: 0 | Status: SHIPPED | OUTPATIENT
Start: 2021-04-23 | End: 2021-04-30

## 2021-04-23 RX ADMIN — LIDOCAINE HYDROCHLORIDE 5 ML: 10 INJECTION, SOLUTION EPIDURAL; INFILTRATION; INTRACAUDAL; PERINEURAL at 12:17

## 2021-04-23 RX ADMIN — ASPIRIN 81 MG: 81 TABLET, COATED ORAL at 09:01

## 2021-04-23 RX ADMIN — AMLODIPINE BESYLATE 2.5 MG: 2.5 TABLET ORAL at 09:01

## 2021-04-23 RX ADMIN — MEROPENEM 1000 MG: 1 INJECTION, POWDER, FOR SOLUTION INTRAVENOUS at 01:52

## 2021-04-23 RX ADMIN — ENOXAPARIN SODIUM 40 MG: 40 INJECTION SUBCUTANEOUS at 09:02

## 2021-04-23 RX ADMIN — PRAVASTATIN SODIUM 40 MG: 40 TABLET ORAL at 09:01

## 2021-04-23 RX ADMIN — MAGNESIUM OXIDE TAB 400 MG (240 MG ELEMENTAL MG) 400 MG: 400 (240 MG) TAB at 09:01

## 2021-04-23 RX ADMIN — CIPROFLOXACIN 400 MG: 2 INJECTION, SOLUTION INTRAVENOUS at 12:37

## 2021-04-23 RX ADMIN — FUROSEMIDE 20 MG: 10 INJECTION, SOLUTION INTRAMUSCULAR; INTRAVENOUS at 09:01

## 2021-04-23 RX ADMIN — CIPROFLOXACIN 400 MG: 2 INJECTION, SOLUTION INTRAVENOUS at 00:32

## 2021-04-23 RX ADMIN — MEROPENEM 1000 MG: 1 INJECTION, POWDER, FOR SOLUTION INTRAVENOUS at 09:16

## 2021-04-23 RX ADMIN — Medication 10 ML: at 09:17

## 2021-04-23 NOTE — PROCEDURES
SINGLE PICC PROCEDURE NOTE  Chart reviewed for allergies, diagnosis, labs, known contraindications, reason for line placement and planned length of treatment. Informed consent noted to be signed and on chart. Insertion procedure discussed with patient/family member. Three patient identifiers  Patient name,   and MRN -  completed &  confirmed verbally. Time out performed Hat, mask and eye shield donned. PICC site cleaned with chlorhexidine wipes then scrubbed with Chloraprep  One-Step applicator for 30 seconds x 1. Hand Hygiene  performed with 3% Chlorhexidine surgical scrub x1 min prior to  Sterile gloves, sterile gown being donned. Patient draped using maximal sterile barrier technique ( head to toe ). PICC site scrubbed a 2nd time with Chloraprep One-Step applicator x 30 sec. Modified Seldinger  technique/ultrasound assisted and tip locating system utilized for insertion. 1% Lidocaine 5 ml injected intradermal pre-insertion. PICC tip location in the SVC confirmed by ECG technology Sherlock 3CG. Positive brisk blood return obtained from all lumens  and each lumen flushed with 10 mls  0.9% Sterile Sodium Chloride. All lumens flush easily with no resistance. Valve placed on all lumens followed by Alcohol Swab Caps on end of each. Bio-patch in place. Catheter secured with non-sutured locking device per hospital protocol. Sterile Tegaderm applied over PICC site. Sterile field maintained during procedure. Positioning and rhythm wire accounted for post procedure and disposed of in sharps. Appearance of site is Clean dry and intact without bleeding or edema. All edges of Tegaderm occlusive. Site marked with date and initials of RN placing line. Teaching performed to pt/family and noted in education section. Bed placed in low position post-procedure. Top 2 side rails in up position call button in reach. Pt.  Response to procedure tolerated well. RN notified of all of the above. A Single Lumen Power PICC was trimmed at 45 CM and placed PABLO per Basilic vein,  4 cm showing from insertion site. Attempt for PICC on R side was unsucessful x3. Unable to advance wire past axilla in R cephalic vein. Unable to gain access to either brachial vein. Veins easily spasm. No complications on L. Able to easily gain access and advance wire and PICC.

## 2021-04-23 NOTE — PROGRESS NOTES
[address] Daily Progress Note      Admit Date:  4/16/2021    CC:  AVR with possible endocarditis. Subjective:  Ms. Hans Maurer receiving a PICC line and preparing for discharge. No acute events overnight. Objective:   BP (!) 162/58   Pulse 65   Temp 97.2 °F (36.2 °C) (Oral)   Resp 18   Ht 5' 4\" (1.626 m)   Wt 173 lb 1 oz (78.5 kg)   SpO2 95%   BMI 29.71 kg/m²       Intake/Output Summary (Last 24 hours) at 4/23/2021 1248  Last data filed at 4/23/2021 1243  Gross per 24 hour   Intake 797.46 ml   Output 2850 ml   Net -2052.54 ml       TELEMETRY: Sinus,    Physical Exam:  General:  Awake, alert, NAD  Eyes:  EOMI PERRL anicteric  Skin:  Warm and dry. Neck:  JVP normal  Chest:  Diminshed. Cardiovascular:RRR, Normal S1S2  II/VI HS and II/VI D Murmur. No Rub. No Gallops. Abdomen:  Soft NT  + bs  Extremities: No edema  Neuro:  CN II-XII intact. No focal deficits. No weakness. No lateralizing findings. Psych:  Normal thought and affect. MSK:  No Cyanosis nor Clubbing.   Symmetrical strength upper and lower extremities    Medications:    sodium chloride flush  5-40 mL Intravenous 2 times per day    furosemide  20 mg Intravenous BID    meropenem  1,000 mg Intravenous Q8H    insulin lispro  0-6 Units Subcutaneous TID WC    insulin lispro  0-3 Units Subcutaneous Nightly    aspirin  81 mg Oral Daily    amLODIPine  2.5 mg Oral Daily    magnesium oxide  400 mg Oral Daily    pravastatin  40 mg Oral Daily    enoxaparin  40 mg Subcutaneous Daily    ciprofloxacin  400 mg Intravenous Q12H      sodium chloride      dextrose      sodium chloride       sodium chloride flush, sodium chloride, albuterol, glucose, dextrose, glucagon (rDNA), dextrose, pantoprazole, sodium chloride, promethazine **OR** ondansetron, polyethylene glycol, acetaminophen **OR** acetaminophen, perflutren lipid microspheres, benzonatate, dextromethorphan-guaiFENesin    Lab Data:  CBC:   Recent Labs     04/21/21  0421 04/22/21  0522 04/23/21  0449   WBC 6.2 6.3 5.4   HGB 13.1 13.2 12.8   HCT 37.6 38.6 36.2   MCV 92.7 93.6 92.9   PLT 75* 87* 80*     BMP:   Recent Labs     04/21/21  0421 04/22/21  0521 04/23/21  0449    137 140   K 3.8 3.6 4.2    102 106   CO2 25 28 27   BUN 9 11 13   CREATININE <0.5* 0.5* 0.6         Assessment:  Patient Active Problem List    Diagnosis Date Noted    Nephrolithiasis 05/31/2013     Priority: High    Hypertension      Priority: High    Hypercholesteremia      Priority: High    Coronary atherosclerosis      Priority: High    Asthma      Priority: High    Osteopenia 06/20/2014     Priority: Medium    Vitamin D deficiency      Priority: Medium    Transient cerebral ischemia      Priority: Medium    Peptic ulcer      Priority: Medium    Septicemia ([facility] [address] 03/23/2013     Priority: Low    Diverticulosis of sigmoid colon 01/03/2012     Priority: Low    Benign neoplasm of colon      Priority: Low    Allergic rhinitis      Priority: Low    Internal hemorrhoids      Priority: Low    Carcinoma in situ of cervix uteri      Priority: Low    SOB (shortness of breath)     Hypervolemia     Sepsis due to Pseudomonas species without acute organ dysfunction (HCC)     Metabolic acidosis     Nausea and vomiting     Fever and chills 03/31/2020    Acute bacterial sinusitis 01/03/2020    Hypertension     Pseudomonal bacteremia 05/31/2019    History of aortic valve replacement with bioprosthetic valve 05/31/2019    Syncope and collapse 01/05/2019    Bifascicular block 01/05/2019    Thrombocytopenia ([facility] [address] 01/05/2019    S/P AVR (aortic valve replacement) 05/02/2014       Plan:  1. AI:  Moderate AI at BRAYAN with possible vegetation. prolonged course of IV ATB guided by blood culture results. She will finish course of IV merem and PO cipro 500 bid on 5/19, and on 5/20 she will start chronic suppressive dose of cipro PO 250mg bid. 2. Acute on chronic D HF:  Restart lasix 20 mg oral  3. HTN: lisinopril 2.5 mg on discharge    Core Measures:  · Discharge instructions:   · LVEF documented:   · ACEI for LV dysfunction:   · Smoking Cessation:    Brien Vázquez, PGY 3  4/23/2021 12:48 PM  Will Staff with Dr. Cece Urena

## 2021-04-23 NOTE — CARE COORDINATION
Case Management Assessment            Discharge Note                    Date / Time of Note: 4/23/2021 12:45 PM                  Discharge Note Completed by: Ortiz Gallegos    Patient Name: Kallie Mcginnis   YOB: 1935  Diagnosis: Fever and chills [R50.9]   Date / Time: 4/16/2021  3:37 PM    Current PCP: Jeri Narayanan MD  Clinic patient: No    Hospitalization in the last 30 days: No    Advance Directives:  Code Status: Limited  [place] DNR form completed and on chart: Not Indicated    Financial:  Payor: Vertell Claude / Plan: Debo Smyth PPO / Product Type: Medicare /      Pharmacy:    [place] [facility] [address]  [place] [address]  Phone: [phone] Fax: [phone]      Assistance purchasing medications?: Potential Assistance Purchasing Medications: No  Assistance provided by Case Management: None at this time    Does patient want to participate in local refill/ meds to beds program?: Yes    Meds To Beds General Rules:  1. Can ONLY be done Monday- Friday between 8:30am-5pm  2. Prescription(s) must be in pharmacy by 3pm to be filled same day  3. Copy of patient's insurance/ prescription drug card and patient face sheet must be sent along with the prescription(s)  4. Cost of Rx cannot be added to hospital bill. If financial assistance is needed, please contact unit  or ;  or  CANNOT provide pharmacy voucher for patients co-pays  5.  Patients can then  the prescription on their way out of the hospital at discharge, or pharmacy can deliver to the bedside if staff is available. (payment due at time of pick-up or delivery - cash, check, or card accepted)     Able to afford home medications/ co-pay costs: Yes    ADLS:  Current PT AM-PAC Score: 23 /24  Current OT AM-PAC Score: 21 /24      DISCHARGE Disposition: Home with [facility] [address]: [facility]: [facility] Phone: [phone] Fax: [phone]    LOC at discharge: Not Applicable  DANILO Completed: Yes    Notification completed in HENS/PAS?:  Not Applicable    IMM Completed:   Yes, Case management has presented and reviewed IMM letter #2 to the patient and/or family/ POA. Patient and/or family/POA verbalized understanding of their medicare rights and appeal process if needed. Patient and/or family/POA has signed, initialed and placed today's date (4.23.21) and time (1310) on IMM letter #2 on the the appropriate lines. Patient and/or family/POA, copy of letter offered and they are aware that this original copy of IMM letter #2 is available prior to discharge from the paper chart on the unit. Electronic documentation has been entered into epic for IMM letter #2 and original paper copy has been added to the paper chart at the nurses station. Transportation:  Transportation PLAN for discharge: family   Mode of Transport: Private Car  Reason for medical transport: Not [address]:  [address] ordered at discharge: Yes  [address]: [facility]  Phone: [phone]  Fax: [phone]  Orders faxed: Yes    Durable Medical Equipment:  DME Provider: na  Equipment obtained during hospitalization: n/a    Home Oxygen and Respiratory Equipment:  Oxygen needed at discharge?: Not [address]: Not Applicable  Portable tank available for discharge?: Not Indicated    Dialysis:  Dialysis patient: No    Dialysis Center:  Not Applicable    Hospice Services:  Location: Not Applicable  Agency: Not Applicable    Consents signed: Not Indicated    Referrals made at [facility] for outpatient continued care:  Not Applicable    Additional CM Notes:   Patient will discharge home with IV abx from [facility], orders in and faxed. Patient will have home care by [facility], first home dose tomorrow, orders in and faxed.   Patient's daughter to transport home today. The Plan for Transition of Care is related to the following treatment goals of Fever and chills [R50.9]    The Patient and/or patient representative Ada and her family were provided with a choice of provider and agrees with the discharge plan Yes    Freedom of choice list was provided with basic dialogue that supports the patient's individualized plan of care/goals and shares the quality data associated with the providers.  Yes    Care Transitions patient: Yes    Aleksandar Dill RN  [facility]  Case Management Department  Ph: [phone]  Fax: [phone]

## 2021-04-23 NOTE — DISCHARGE SUMMARY
continued to spike fevers. She was put on bicarb infusion as well, which resolved her metabolic acidosis. The patient underwent TTE 4/19: \"There are small lesions on both the mitral and aortic valves suspicious for    vegetation(s). \" Cardiology and CT surgery consulted. Patient became short of breath which resolved with IV lasix. BRAYAN was performed on 4/22, and per Dr. Jenny Pena (CT surgery), \"does not definitively suggest the need for surgical intervention at this time. \" She was recommended to undergo a prolonged course of antibiotics with chronic suppressive abx. He also suggested possibly having any remaining teeth removed in order to decrease any further health risk to her prosthetic valve. Patient feels well, has been afebrile, and is OK for discharge on IV antibiotics today. She will finish course of IV merem and PO cipro 500 bid on 5/19, and on 5/20 she will start chronic suppressive dose of cipro PO 250mg bid. Physical Exam:  BP (!) 168/50   Pulse 61   Temp 97.9 °F (36.6 °C) (Oral)   Resp 18   Ht 5' 4\" (1.626 m)   Wt 173 lb 1 oz (78.5 kg)   SpO2 95%   BMI 29.71 kg/m²     General appearance:  No apparent distress, appears stated age and cooperative. HEENT:  Normal cephalic,atraumatic without obvious deformity. Pupils equal, round, and reactive to light.  Extra ocular muscles intact. Conjunctivae/corneas clear. No caries noted.   Neck: Supple, with full range of motion. No jugular venous distention. Trachea midline. Respiratory:  Normal respiratory effort. No rales. Cardiovascular:  Regular rate and rhythm with normal S1/S2. Abdomen: Soft, non-tender, non-distended with normal bowel sounds. Musculoskeletal:  No clubbing, cyanosis or edema bilaterally.  Full range of motion without deformity. No septic or swollen appearing joints. Skin: Skin color, texture, turgor normal.  No rashes or lesions.  No boils, pimples or sores.   Neurologic:  Neurovascularly intact without any focal sensory/motor deficits. Cranialnerves: II-XII intact, grossly non-focal.  Psychiatric:  Alert and oriented, thought content appropriate,normal insight  Capillary Refill: Brisk,< 3 seconds   Peripheral Pulses: +2 palpable, equal bilaterally   LABS:    Consults: cardiology, ID and CT surgery  Significant Diagnostic Studies:  chest x-ray, TTE, BRAYAN, Urine cx, blood cx  Treatments: meropenem, ciprofloxacin, IV fluids, lasix  Disposition: home  Discharged Condition: Stable  Follow Up: Primary Care Physician in one week, CT surgery (Dr. Jenny Pena) in 1 week, Cardiology (Dr. Matilde Nichols) in 1 week, ID (Dr. Jay Jay Martin) in 3 weeks    DISCHARGE MEDICATION:     Medication List      START taking these medications    benzonatate 100 MG capsule  Commonly known as: TESSALON  Take 1 capsule by mouth 3 times daily as needed for Cough  Notes to patient: signs of an allergic reaction: hives; difficult breathing; swelling of your face, lips, tongue, or throat. ciprofloxacin 500 MG tablet  Commonly known as: CIPRO  Take 1 tablet by mouth 2 times daily for 26 days     dextromethorphan-guaiFENesin  MG per extended release tablet  Commonly known as: MUCINEX DM  Take 1 tablet by mouth 2 times daily as needed for Cough  Notes to patient: signs of an allergic reaction: hives; difficult breathing; swelling of your face, lips, tongue, or throat. furosemide 20 MG tablet  Commonly known as: Lasix  Take 1 tablet by mouth daily        CONTINUE taking these medications    amLODIPine 2.5 MG tablet  Commonly known as: NORVASC  Take 1 tablet by mouth daily     aspirin 81 MG tablet     * blood glucose test strips strip  Commonly known as: ONE TOUCH ULTRA TEST  1 each by In Vitro route 2 times daily As needed. * blood glucose test strips  Test 1-2 times a day & as needed for symptoms of irregular blood glucose. Dispense sufficient amount for indicated testing frequency plus additional to accommodate PRN testing needs.      cetirizine 10 MG tablet  Commonly known as: ZYRTEC     GNP GINGKO BILOBA EXTRACT PO     magnesium 250 MG Tabs tablet  Commonly known as: MAGNESIUM-OXIDE     ONE-A-DAY WOMENS 50 PLUS PO     pantoprazole 40 MG tablet  Commonly known as: PROTONIX     pravastatin 40 MG tablet  Commonly known as: PRAVACHOL  TAKE ONE TABLET BY MOUTH DAILY     vitamin D 1.25 MG (99057 UT) Caps capsule  Commonly known as: ERGOCALCIFEROL  TAKE ONE CAPSULE BY MOUTH ONCE WEEKLY         * This list has 2 medication(s) that are the same as other medications prescribed for you. Read the directions carefully, and ask your doctor or other care provider to review them with you. Where to Get Your Medications      [facility] [phone] [address] [address]    Phone: [phone]   · benzonatate 100 MG capsule  · ciprofloxacin 500 MG tablet  · dextromethorphan-guaiFENesin  MG per extended release tablet  · furosemide 20 MG tablet       Activity: activity as tolerated  Diet: cardiac diet  Wound Care: none needed    Time Spent on discharge is more than 30 minutes    Signed:  Enoch Cushing, MD, PGY-1  4/23/2021     Addendum to Resident H& P/Progress note:  I have personally seen,examined and evaluated the patient.  I have reviewed the current history, physical findings, labs and assessment and plan and agree with note as documented by resident MD ( Les Frankel)      Cihno Ruiz MD, FACP

## 2021-04-23 NOTE — PROGRESS NOTES
ID Follow-up NOTE    CC:   Pseudomonas bacteremia / endocarditis   Antibiotics: Meropenem, Ciprofloxacin    Admit Date: 4/16/2021  Hospital Day: 8    Subjective:     Patient reports she feels \"good\"  No complaint    Objective:     Patient Vitals for the past 8 hrs:   BP Temp Temp src Pulse Resp SpO2 Weight   04/23/21 0750 (!) 168/50 97.9 °F (36.6 °C) Oral   95 %    04/23/21 0600    61      04/23/21 0455 (!) 159/48 98.2 °F (36.8 °C) Oral 60 18 93 % 173 lb 1 oz (78.5 kg)   04/23/21 0400    60      04/23/21 0200    54        I/O last 3 completed shifts: In: 677.5 [P.O.:360; I.V.:117.5; IV Piggyback:200]  Out: 1350 [IVAHD:1881]  I/O this shift:  In: 120 [P.O.:120]  Out: 200 [Urine:200]    EXAM:  GENERAL: No apparent distress.   RA  HEENT: Membranes moist, no oral lesion  NECK:  Supple, no lymphadenopathy  LUNGS: Clear b/l, no rales, no dullness  CARDIAC: RRR, no murmur appreciated  ABD:  + BS, soft / NT  EXT:  No rash, no edema, no lesions  NEURO: No focal neurologic findings  PSYCH: Orientation, sensorium, mood normal  LINES:  Peripheral iv       Data Review:  Lab Results   Component Value Date    WBC 5.4 04/23/2021    HGB 12.8 04/23/2021    HCT 36.2 04/23/2021    MCV 92.9 04/23/2021    PLT 80 (L) 04/23/2021     Lab Results   Component Value Date    CREATININE 0.6 04/23/2021    BUN 13 04/23/2021     04/23/2021    K 4.2 04/23/2021     04/23/2021    CO2 27 04/23/2021       Hepatic Function Panel:   Lab Results   Component Value Date    ALKPHOS 61 02/26/2021    ALT 21 02/26/2021    AST 33 02/26/2021    PROT 7.0 02/26/2021    PROT 7.4 10/09/2012    BILITOT 1.0 02/26/2021    BILIDIR <0.2 05/18/2020    IBILI see below 05/18/2020    LABALBU 4.0 02/26/2021       MICRO:  4/16 BC Ps aeruginosa  Pseudomonas aeruginosa  Antibiotic Interpretation PEBBLES   cefepime Intermediate 16 mcg/mL   ciprofloxacin Sensitive <=1 mcg/mL   gentamicin Intermediate 8 mcg/mL   meropenem Sensitive <=1 mcg/mL piperacillin-tazobactam Sensitive <=16 mcg/mL   tobramycin Sensitive <=4 mcg/mL      BC no growth    IMAGIN/16 CXR neg    ECHO:   TTE   Summary   Left ventricular cavity size is normal. There is mild to moderate concentric   left ventricular hypertrophy. Overall left ventricular systolic function   appears normal with an ejection fraction of 50-55%. No regional wall motion   abnormalities are noted. Diastolic filling parameters suggest grade II   diastolic dysfunction. The mitral valve leaflets are slightly thickened with   normal leaflet mobility. Mitral annular calcification is present. The peak   mitral valve velocity is 1.78 m/s and the mean pressure gradient is 4 mmHg. A bioprosthetic artificial aortic valve appears well seated with a maximum   velocity of 4.12m/s and a mean gradient of 41mmHg. Moderate aortic   regurgitation. Mild to moderate tricuspid regurgitation. Estimated pulmonary   artery systolic pressure is at 43 mmHg assuming a right atrial pressure of 8   mmHg. The right ventricle is normal in size. TAPSE measures 1.78 cm and the   RVS velocity measures 8.7cm/s. The left atrium is at the upper limits of   normal in size. IVC size is dilated (>2.1 cm) but collapses > 50% with   respiration consistent with elevated RA pressure (8 mmHg). There are small lesions on both the mitral and aortic valves suspicious for   vegetation(s).       Scheduled Meds:   lidocaine 1 % injection  5 mL Intradermal Once    sodium chloride flush  5-40 mL Intravenous 2 times per day    furosemide  20 mg Intravenous BID    meropenem  1,000 mg Intravenous Q8H    insulin lispro  0-6 Units Subcutaneous TID WC    insulin lispro  0-3 Units Subcutaneous Nightly    aspirin  81 mg Oral Daily    amLODIPine  2.5 mg Oral Daily    magnesium oxide  400 mg Oral Daily    pravastatin  40 mg Oral Daily    enoxaparin  40 mg Subcutaneous Daily    ciprofloxacin  400 mg Intravenous Q12H       Continuous Infusions:   sodium chloride      dextrose      sodium chloride         PRN Meds:  sodium chloride flush, sodium chloride, albuterol, glucose, dextrose, glucagon (rDNA), dextrose, pantoprazole, sodium chloride, promethazine **OR** ondansetron, polyethylene glycol, acetaminophen **OR** acetaminophen, perflutren lipid microspheres, benzonatate, dextromethorphan-guaiFENesin      Assessment:     Hx HTN, AS  AVR 7/2013 ([facility]), CAD, nephrolithiasis  Hx E coli bacteremia, 3/2013  Hx Streptococcal salivarius bacteremia 5/31/2019  Hx Ps aeruginosa bacteremia 3/31/20    Admit 4/16/21 - fever, rigor / shaking chill (acute onset, no other sx)  Pseudomonas aeruginosa bacteremia - in setting bioprosthetic AVR, abnormal TTE (\"suspicious\" lesions on MV, AoV). BRAYAN with AoV abnormality, c/c vegetation. Discussed with Dr Kasia Ramirez.     Plan:     Cont meropenem, ciprofloxacin  Seen by CT surg, reviewed note    Plan to treat with meropenem + ciprofloxacin x 4 mo, followed by po ciprofloxacin (chronically)  PICC  See DANILO    F/u Cardiology, CT surgery, PCP  Can see me after on suppressive ciprofloxacin    Discussed with pt, RN, Dr Gerardo Mcintosh MD    INFUSION ORDERS:  Meropenem 2 gm iv q 12 hr through 5/19 (in addition to ciprofloxacin 500 mg po bid)  - Diagnosis - Pseudomonas bacteremia / endocarditis  - First dose given in hospital  - PICC   - Disposition / date discharge  - Check CBC w diff, CMP, ESR, CRP every Mon or Tue - FAX result to [phone]  - Call with antibiotic / infusion issues, [phone]  - Call with any change in status, transfer in or out of a facility or to hospital - [phone]  - No f/u in outpatient ID office necessary

## 2021-04-23 NOTE — PLAN OF CARE
Problem: Discharge Planning:  Goal: Participates in care planning  Description: Participates in care planning  Outcome: Completed  Note: Home today with IV  PICC line antibiotics     Problem: Discharge Planning:  Goal: Discharged to appropriate level of care  Description: Discharged to appropriate level of care  Outcome: Completed     Problem: Pain:  Description: Pain management should include both nonpharmacologic and pharmacologic interventions. Goal: Pain level will decrease  Description: Pain level will decrease  Outcome: Completed  Note: Denies any     Problem: Pain:  Description: Pain management should include both nonpharmacologic and pharmacologic interventions. Goal: Control of acute pain  Description: Control of acute pain  Outcome: Completed     Problem: Pain:  Description: Pain management should include both nonpharmacologic and pharmacologic interventions.   Goal: Control of chronic pain  Description: Control of chronic pain  Outcome: Completed     Problem: Gas Exchange - Impaired:  Goal: Levels of oxygenation will improve  Description: Levels of oxygenation will improve  4/23/2021 1020 by Leopold Henry, RN  Outcome: Completed  Note: On room air sats wnl's, rhonchi with cough , diminished breath sounds , non productive cough     Problem: Infection, Septic Shock:  Goal: Will show no infection signs and symptoms  Description: Will show no infection signs and symptoms  4/23/2021 1020 by Leopold Henry, RN  Outcome: Completed  Note: No fever     Problem: Infection - Ventilator-Associated Pneumonia:  Goal: Absence of pulmonary infection  Description: Absence of pulmonary infection  Outcome: Completed     Problem: Serum Glucose Level - Abnormal:  Goal: Ability to maintain appropriate glucose levels will improve  Description: Ability to maintain appropriate glucose levels will improve  Outcome: Completed  Note: Covering blood sugars with SSI     Problem: Tissue Perfusion, Altered:  Goal: Circulatory function within specified parameters  Description: Circulatory function within specified parameters  4/23/2021 1020 by Chin Lynn RN  Outcome: Completed     Problem: Venous Thromboembolism:  Goal: Will show no signs or symptoms of venous thromboembolism  Description: Will show no signs or symptoms of venous thromboembolism  Outcome: Completed     Problem: Venous Thromboembolism:  Goal: Absence of signs or symptoms of impaired coagulation  Description: Absence of signs or symptoms of impaired coagulation  Outcome: Completed     Problem: Falls - Risk of:  Goal: Will remain free from falls  Description: Will remain free from falls  4/23/2021 1020 by Chin Lynn RN  Outcome: Completed  Note: Up ad shyanne     Problem: Falls - Risk of:  Goal: Absence of physical injury  Description: Absence of physical injury  Outcome: Completed     Problem: Skin Integrity:  Goal: Will show no infection signs and symptoms  Description: Will show no infection signs and symptoms  Outcome: Completed     Problem: Skin Integrity:  Goal: Absence of new skin breakdown  Description: Absence of new skin breakdown  Outcome: Completed

## 2021-04-26 ENCOUNTER — FOLLOWUP TELEPHONE ENCOUNTER (OUTPATIENT)
Dept: TELEMETRY | Age: 86
End: 2021-04-26

## 2021-04-27 LAB
ALBUMIN SERPL-MCNC: 3.3 G/DL
ALP BLD-CCNC: 67 U/L
ALT SERPL-CCNC: 25 U/L
ANION GAP SERPL CALCULATED.3IONS-SCNC: NORMAL MMOL/L
AST SERPL-CCNC: 36 U/L
BASOPHILS ABSOLUTE: 0.1 /ΜL
BASOPHILS RELATIVE PERCENT: 1.1 %
BILIRUB SERPL-MCNC: 1 MG/DL (ref 0.1–1.4)
BUN BLDV-MCNC: 14 MG/DL
CALCIUM SERPL-MCNC: 8.6 MG/DL
CHLORIDE BLD-SCNC: 107 MMOL/L
CO2: 25 MMOL/L
CREAT SERPL-MCNC: 0.68 MG/DL
EOSINOPHILS ABSOLUTE: 0.3 /ΜL
EOSINOPHILS RELATIVE PERCENT: 4.4 %
GFR CALCULATED: NORMAL
GLUCOSE BLD-MCNC: 94 MG/DL
HCT VFR BLD CALC: 39.1 % (ref 36–46)
HEMOGLOBIN: 13.3 G/DL (ref 12–16)
LYMPHOCYTES ABSOLUTE: 1.1 /ΜL
LYMPHOCYTES RELATIVE PERCENT: 17.3 %
MCH RBC QN AUTO: 31.6 PG
MCHC RBC AUTO-ENTMCNC: 34 G/DL
MCV RBC AUTO: 92.9 FL
MONOCYTES ABSOLUTE: 0.4 /ΜL
MONOCYTES RELATIVE PERCENT: 7 %
NEUTROPHILS ABSOLUTE: 4.4 /ΜL
NEUTROPHILS RELATIVE PERCENT: 70.2 %
PDW BLD-RTO: 14.3 %
PLATELET # BLD: 98 K/ΜL
PMV BLD AUTO: 11 FL
POTASSIUM SERPL-SCNC: 4.5 MMOL/L
RBC # BLD: 4.21 10^6/ΜL
SEDIMENTATION RATE, ERYTHROCYTE: 30
SODIUM BLD-SCNC: 139 MMOL/L
TOTAL PROTEIN: 6.2
WBC # BLD: 6.2 10^3/ML

## 2021-04-28 ENCOUNTER — FOLLOWUP TELEPHONE ENCOUNTER (OUTPATIENT)
Dept: INPATIENT UNIT | Age: 86
End: 2021-04-28

## 2021-05-03 LAB
ALBUMIN SERPL-MCNC: 3.5 G/DL
ALP BLD-CCNC: 65 U/L
ALT SERPL-CCNC: 24 U/L
ANION GAP SERPL CALCULATED.3IONS-SCNC: NORMAL MMOL/L
AST SERPL-CCNC: 44 U/L
BASOPHILS ABSOLUTE: 0.1 /ΜL
BASOPHILS RELATIVE PERCENT: 1.2 %
BILIRUB SERPL-MCNC: 1.2 MG/DL (ref 0.1–1.4)
BUN BLDV-MCNC: 16 MG/DL
CALCIUM SERPL-MCNC: 8.7 MG/DL
CHLORIDE BLD-SCNC: 106 MMOL/L
CO2: 28 MMOL/L
CREAT SERPL-MCNC: 0.67 MG/DL
EOSINOPHILS ABSOLUTE: 0.3 /ΜL
EOSINOPHILS RELATIVE PERCENT: 6.2 %
GFR CALCULATED: NORMAL
GLUCOSE BLD-MCNC: 98 MG/DL
HCT VFR BLD CALC: 39.3 % (ref 36–46)
HEMOGLOBIN: 13.5 G/DL (ref 12–16)
LYMPHOCYTES ABSOLUTE: 1 /ΜL
LYMPHOCYTES RELATIVE PERCENT: 20.8 %
MCH RBC QN AUTO: 31.7 PG
MCHC RBC AUTO-ENTMCNC: 34.4 G/DL
MCV RBC AUTO: 92.2 FL
MONOCYTES ABSOLUTE: 0.5 /ΜL
MONOCYTES RELATIVE PERCENT: 9.4 %
NEUTROPHILS ABSOLUTE: 3 /ΜL
NEUTROPHILS RELATIVE PERCENT: 62.4 %
PDW BLD-RTO: 14.4 %
PLATELET # BLD: 103 K/ΜL
PMV BLD AUTO: 10.5 FL
POTASSIUM SERPL-SCNC: 4.5 MMOL/L
RBC # BLD: 4.26 10^6/ΜL
SEDIMENTATION RATE, ERYTHROCYTE: 32
SODIUM BLD-SCNC: 142 MMOL/L
TOTAL PROTEIN: 3.5
WBC # BLD: 4.8 10^3/ML

## 2021-05-04 ENCOUNTER — OFFICE VISIT (OUTPATIENT)
Dept: FAMILY MEDICINE CLINIC | Age: 86
End: 2021-05-04
Payer: MEDICARE

## 2021-05-04 VITALS
SYSTOLIC BLOOD PRESSURE: 106 MMHG | DIASTOLIC BLOOD PRESSURE: 58 MMHG | WEIGHT: 166 LBS | HEIGHT: 64 IN | OXYGEN SATURATION: 97 % | BODY MASS INDEX: 28.34 KG/M2 | HEART RATE: 67 BPM

## 2021-05-04 DIAGNOSIS — I33.0 SUBACUTE BACTERIAL ENDOCARDITIS: ICD-10-CM

## 2021-05-04 DIAGNOSIS — D69.6 THROMBOCYTOPENIA (HCC): Primary | Chronic | ICD-10-CM

## 2021-05-04 DIAGNOSIS — Z95.2 S/P AVR (AORTIC VALVE REPLACEMENT): ICD-10-CM

## 2021-05-04 PROCEDURE — 1111F DSCHRG MED/CURRENT MED MERGE: CPT | Performed by: INTERNAL MEDICINE

## 2021-05-04 PROCEDURE — 99495 TRANSJ CARE MGMT MOD F2F 14D: CPT | Performed by: INTERNAL MEDICINE

## 2021-05-04 ASSESSMENT — ENCOUNTER SYMPTOMS
SHORTNESS OF BREATH: 0
ABDOMINAL PAIN: 0
NAUSEA: 0
CHEST TIGHTNESS: 0
VOMITING: 0

## 2021-05-04 NOTE — PROGRESS NOTES
Post-Discharge Transitional Care Management Services or Hospital Follow Up      Bhumi Madera   YOB: 1935    Date of Office Visit:  5/4/2021  Date of Hospital Admission: 4/16/21  Date of Hospital Discharge: 4/23/21  Risk of hospital readmission (high >=14%.  Medium >=10%) :Readmission Risk Score: 14      Care management risk score Rising risk (score 2-5) and Complex Care (Scores >=6): 3     Non face to face  following discharge, date last encounter closed (first attempt may have been earlier): *No documented post hospital discharge outreach found in the last 14 days    Call initiated 2 business days of discharge: *No response recorded in the last 14 days    Patient Active Problem List   Diagnosis    Hypertension    Hypercholesteremia    Vitamin D deficiency    Coronary atherosclerosis    Transient cerebral ischemia    Asthma    Peptic ulcer    Benign neoplasm of colon    Allergic rhinitis    Internal hemorrhoids    Carcinoma in situ of cervix uteri    Diverticulosis of sigmoid colon    Septicemia ([address]    Nephrolithiasis    Osteopenia    S/P AVR (aortic valve replacement)    Syncope and collapse    Bifascicular block    Thrombocytopenia (HCC)    Pseudomonal bacteremia    History of aortic valve replacement with bioprosthetic valve    Hypertension    Acute bacterial sinusitis    Fever and chills    Nausea and vomiting    Sepsis due to Pseudomonas species without acute organ dysfunction (HCC)    Metabolic acidosis    Hypervolemia    SOB (shortness of breath)    Acute bacterial endocarditis       Allergies   Allergen Reactions    Actos [Pioglitazone Hydrochloride]      angioedema     Lisinopril Other (See Comments)     Swelling of tongue    Penicillins Hives and Itching    Lipitor      myalgias    Zocor [Simvastatin] Other (See Comments)     Leg tingling , myalgias    Glucophage [Metformin Hydrochloride]      diarrhea       Medications listed as ordered at the time daily as needed for Cough 30 tablet 0    ciprofloxacin (CIPRO) 500 MG tablet Take 1 tablet by mouth 2 times daily for 26 days 52 tablet 0    furosemide (LASIX) 20 MG tablet Take 1 tablet by mouth daily 60 tablet 3    pantoprazole (PROTONIX) 40 MG tablet Take 40 mg by mouth daily as needed (heartburn symptoms)      magnesium (MAGNESIUM-OXIDE) 250 MG TABS tablet Take 250 mg by mouth daily       blood glucose monitor strips Test 1-2 times a day & as needed for symptoms of irregular blood glucose. Dispense sufficient amount for indicated testing frequency plus additional to accommodate PRN testing needs. 90 strip 0    blood glucose test strips (ONE TOUCH ULTRA TEST) strip 1 each by In Vitro route 2 times daily As needed. 100 each 3    pravastatin (PRAVACHOL) 40 MG tablet TAKE ONE TABLET BY MOUTH DAILY 90 tablet 3    amLODIPine (NORVASC) 2.5 MG tablet Take 1 tablet by mouth daily 90 tablet 3    vitamin D (ERGOCALCIFEROL) 1.25 MG (76831 UT) CAPS capsule TAKE ONE CAPSULE BY MOUTH ONCE WEEKLY 12 capsule 3    Ginkgo Biloba (GNP GINGKO BILOBA EXTRACT PO) Take 1 capsule by mouth daily       Multiple Vitamins-Minerals (ONE-A-DAY WOMENS 50 PLUS PO) Take 1 capsule by mouth daily      cetirizine (ZYRTEC) 10 MG tablet Take 10 mg by mouth daily as needed for Allergies or Rhinitis       aspirin 81 MG tablet Take 81 mg by mouth daily. Medications patient taking as of now reconciled against medications ordered at time of hospital discharge: Yes    Chief Complaint   Patient presents with   [address] from Hospital       History of Present illness - Follow up of Hospital diagnosis(es):     pt admitted for fever chills. She was diagnosed w/ IEC of bioprosthetic aortic valve  She was started on Abx : oral Cipro+ I.v Meropenem   Pt feels well. Pt denies CP/SOB/palpitations/abdominal pain/N/V.    No fever/ chills/ cough  Pt is followed by Dr Asya June and Dr Merly Bravo   Pt is on lasix for heart failure  Pt feels dizziness Inpatient course: Discharge summary reviewed- see chart. BP today is soft   She also takes Amlodipine 2.5 mg   dizizness w/ standign up   Interval history/Current status:     4/22 echo:  Summary   Left ventricular cavity size is normal. There is mild to moderate concentric   left ventricular hypertrophy. Overall left ventricular systolic function   appears normal with an ejection fraction of 50%. No regional wall motion   abnormalities are noted. The mitral valve leaflets are thickened. Mitral   annular calcification is present. Mild mitral regurgitation is present. A   thickened bioprosthetic artificial aortic valve appears well seated. and   there is an echogenic mobile structure seen on the valve. There is severe   aortic valve insufficiency. Mild to moderate tricuspid regurgitation. A   bubble study was performed and showed no evidence of right to left shunting. No intracardiac thrombus was seen. Review of Systems   Constitutional: Negative for activity change, appetite change, chills, fever and unexpected weight change. HENT: Negative for hearing loss. Eyes: Negative for visual disturbance. Respiratory: Negative for chest tightness and shortness of breath. Cardiovascular: Negative for chest pain. Gastrointestinal: Negative for abdominal pain, nausea and vomiting. Genitourinary: Negative for hematuria. Skin: Negative for rash. Allergic/Immunologic: Negative for immunocompromised state. Neurological: Negative for dizziness and headaches. Psychiatric/Behavioral: Negative for dysphoric mood and suicidal ideas. Vitals:    05/04/21 1628   BP: (!) 106/58   Site: Right Upper Arm   Position: Sitting   Cuff Size: Medium Adult   Pulse: 67   SpO2: 97%   Weight: 166 lb (75.3 kg)   Height: 5' 4\" (1.626 m)     Body mass index is 28.49 kg/m².    Wt Readings from Last 3 Encounters:   05/04/21 166 lb (75.3 kg)   04/23/21 173 lb 1 oz (78.5 kg)   02/23/21 165 lb (74.8 kg)     BP Readings from Last 3 Encounters:   05/04/21 (!) 106/58   04/23/21 (!) 162/58   02/23/21 110/68        Physical Exam:Physical Exam  Constitutional:       General: She is not in acute distress. Appearance: She is well-developed. She is not diaphoretic. HENT:      Head: Normocephalic and atraumatic. Mouth/Throat:      Pharynx: No oropharyngeal exudate. Eyes:      General: No scleral icterus. Right eye: No discharge. Left eye: No discharge. Conjunctiva/sclera: Conjunctivae normal.   Neck:      Musculoskeletal: Normal range of motion and neck supple. Cardiovascular:      Rate and Rhythm: Normal rate. Heart sounds: Normal heart sounds. No murmur. Pulmonary:      Effort: Pulmonary effort is normal. No respiratory distress. Breath sounds: Normal breath sounds. No wheezing or rales. Abdominal:      General: There is no distension. Palpations: Abdomen is soft. Tenderness: There is no abdominal tenderness. Musculoskeletal:         General: No deformity. Lymphadenopathy:      Head:      Right side of head: No submental or submandibular adenopathy. Left side of head: No submental or submandibular adenopathy. Cervical: No cervical adenopathy. Right cervical: No superficial or deep cervical adenopathy. Left cervical: No superficial or deep cervical adenopathy. Skin:     Findings: No rash. Neurological:      Mental Status: She is alert and oriented to person, place, and time. GCS: GCS eye subscore is 4. GCS verbal subscore is 5. GCS motor subscore is 6. Motor: No abnormal muscle tone. Deep Tendon Reflexes: Reflexes are normal and symmetric. Psychiatric:         Behavior: Behavior normal.         Thought Content: Thought content normal.       Assessment/Plan:  1. Thrombocytopenia ([address]  stable   No changes   - ND DISCHARGE MEDS RECONCILED W/ CURRENT OUTPATIENT MED LIST    2.  S/P AVR (aortic valve replacement)  Follows CTS   No need for surgery now  No decompensation  She was placed on lasix   BP is soft, pt dizzy, stop carefully lasix and monitor   - OR DISCHARGE MEDS RECONCILED W/ CURRENT OUTPATIENT MED LIST    3.  Subacute bacterial endocarditis  On Abx  She feels fine  Keep Abx  Followed by ID   - OR DISCHARGE MEDS RECONCILED W/ CURRENT OUTPATIENT MED LIST        Medical Decision Making: high complexity

## 2021-05-04 NOTE — PATIENT INSTRUCTIONS
Se let me know in 2-3 days how are you feeling. Please call the office (and ask to see me) or be seen by other provider for any worsening or lack of improvement of the symptoms or for any new symptoms as soon as possible. Please make sure to schedule your follow appointment as discussed. Please let me know if ay further questions. Please let me know if there are any symptoms or concerns that you feel that needs to be further addressed.

## 2021-05-06 ENCOUNTER — OFFICE VISIT (OUTPATIENT)
Dept: CARDIOLOGY CLINIC | Age: 86
End: 2021-05-06
Payer: MEDICARE

## 2021-05-06 VITALS
DIASTOLIC BLOOD PRESSURE: 50 MMHG | WEIGHT: 164.4 LBS | HEIGHT: 64 IN | OXYGEN SATURATION: 94 % | BODY MASS INDEX: 28.07 KG/M2 | SYSTOLIC BLOOD PRESSURE: 130 MMHG | HEART RATE: 80 BPM

## 2021-05-06 DIAGNOSIS — Z95.2 S/P AVR (AORTIC VALVE REPLACEMENT): ICD-10-CM

## 2021-05-06 DIAGNOSIS — E78.00 HYPERCHOLESTEREMIA: ICD-10-CM

## 2021-05-06 DIAGNOSIS — G45.8 OTHER SPECIFIED TRANSIENT CEREBRAL ISCHEMIAS: ICD-10-CM

## 2021-05-06 DIAGNOSIS — I10 ESSENTIAL HYPERTENSION, BENIGN: Primary | ICD-10-CM

## 2021-05-06 DIAGNOSIS — I35.1 NONRHEUMATIC AORTIC VALVE INSUFFICIENCY: ICD-10-CM

## 2021-05-06 DIAGNOSIS — I33.0 ACUTE BACTERIAL ENDOCARDITIS: ICD-10-CM

## 2021-05-06 DIAGNOSIS — I25.10 ATHEROSCLEROSIS OF CORONARY ARTERY OF NATIVE HEART WITHOUT ANGINA PECTORIS, UNSPECIFIED VESSEL OR LESION TYPE: ICD-10-CM

## 2021-05-06 PROCEDURE — 99204 OFFICE O/P NEW MOD 45 MIN: CPT | Performed by: INTERNAL MEDICINE

## 2021-05-06 PROCEDURE — 93000 ELECTROCARDIOGRAM COMPLETE: CPT | Performed by: INTERNAL MEDICINE

## 2021-05-06 ASSESSMENT — ENCOUNTER SYMPTOMS
VOMITING: 0
WHEEZING: 0
CHEST TIGHTNESS: 0
COLOR CHANGE: 0
ABDOMINAL DISTENTION: 0
COUGH: 0
EYE DISCHARGE: 0
BACK PAIN: 0
FACIAL SWELLING: 0
BLOOD IN STOOL: 0
SHORTNESS OF BREATH: 0
ABDOMINAL PAIN: 0

## 2021-05-06 NOTE — ASSESSMENT & PLAN NOTE
Antibiotics per ID. Repeat BRAYAN once she completes IV antibiotics. (She will let us know)  We will also consider performing a PET to assess surrounding tissue to prosthetic valve.

## 2021-05-06 NOTE — PROGRESS NOTES
as uncontrolled(250.50)     Type II or unspecified type diabetes mellitus without mention of complication, not stated as uncontrolled     Unspecified asthma(493.90)     seasonal    Unspecified transient cerebral ischemia     Unspecified vitamin D deficiency        PSH  Past Surgical History:   Procedure Laterality Date    AORTIC VALVE REPLACEMENT  7/12/2013     Dr. Jones [facility]  6/6/2013     Dr. Enrique Baker - coronaries WNL , LVEF WNL    Lonell Khan  5/2012    Dr. Althea Cesar - bilateral    Batsheva Duet    Dr. Armando Vasquez    COLONOSCOPY  03/30/2011    DR. Mello  - Internal hemorrhoids, sigmoid diverticulosis, hyperplastic polyps. Repeat in five  years.  COLONOSCOPY  02/23/2009    nternal hemorrhoids, Diverticulosis, Colon polyps. Repeat two years    COLONOSCOPY  5/8/2013     Dr. Mary Anne Dailey  - internal hemorrhoids , diverticulosis and polyps     COLONOSCOPY  5/4/2015, 3/7/2016    - hemorrhoids, diverticulosis, hyperplastic colon polyps, repeat in 1 year    COLONOSCOPY  1/28/2019    [facility]  1/28[facility]2019    COLONOSCOPY POLYPECTOMY ABLATION performed [facility]  1/28/2019    COLONOSCOPY CONTROL HEMORRHAGE performed by Dorothea Mcbride MD at [address] [facility] [facility]  [phone]     DR. Dacosta - CYSTOSCOPY RETROGRADE, RIGHT STENT PLACEMENT    OTHER SURGICAL HISTORY  6/27/2013    Dr. Bisi Main - CYSTOSCOPY RIGHT URETEROSCOPY, RENAL STONE EXTRACTION    UPPER GASTROINTESTINAL ENDOSCOPY  3/7/2016    gastritis and esophagitis     UPPER GASTROINTESTINAL ENDOSCOPY N/A 1/28/2019    EGD BIOPSY performed by Dorothea Mcbride MD at [facility] [address]  Social History     Tobacco Use    Smoking status: Never Smoker    Smokeless tobacco: Never Used   Substance Use Topics    ONCE WEEKLY 12/17/20  Yes Nicolas Vazquez MD   Ginkgo Biloba (GNP GINGKO BILOBA EXTRACT PO) Take 1 capsule by mouth daily    Yes Historical Provider, MD   Multiple Vitamins-Minerals (ONE-A-DAY WOMENS 50 PLUS PO) Take 1 capsule by mouth daily   Yes Historical Provider, MD   cetirizine (ZYRTEC) 10 MG tablet Take 10 mg by mouth daily as needed for Allergies or Rhinitis    Yes Historical Provider, MD   aspirin 81 MG tablet Take 81 mg by mouth daily. Yes Historical Provider, MD   dextromethorphan-guaiFENesin [facility] \[facility]\"" DM)  MG per extended release tablet Take 1 tablet by mouth 2 times daily as needed for Cough  Patient not taking: Reported on 5/6/2021 4/23/21 5/8/21  Azul Stephens MD        Review of Systems   Constitutional: Negative for activity change, appetite change, diaphoresis, fatigue, fever and unexpected weight change. HENT: Negative for congestion, facial swelling, mouth sores and nosebleeds. Eyes: Negative for discharge and visual disturbance. Respiratory: Negative for cough, chest tightness, shortness of breath and wheezing. Cardiovascular: Negative for chest pain, palpitations and leg swelling. Gastrointestinal: Negative for abdominal distention, abdominal pain, blood in stool and vomiting. Endocrine: Negative for cold intolerance, heat intolerance and polyuria. Genitourinary: Negative for difficulty urinating, dysuria, frequency and hematuria. Musculoskeletal: Negative for back pain, joint swelling, myalgias and neck pain. Skin: Negative for color change, pallor and rash. Allergic/Immunologic: Negative for immunocompromised state. Neurological: Negative for dizziness, syncope, weakness, light-headedness, numbness and headaches. Hematological: Negative for adenopathy. Does not bruise/bleed easily. Psychiatric/Behavioral: Negative for behavioral problems, confusion, decreased concentration and suicidal ideas. The patient is not nervous/anxious.         Vitals: Value Date    WBC 5.4 04/23/2021    HGB 12.8 04/23/2021    HCT 36.2 04/23/2021    MCV 92.9 04/23/2021    PLT 80 (L) 04/23/2021     Lab Results   Component Value Date     04/23/2021    K 4.2 04/23/2021     04/23/2021    CO2 27 04/23/2021    BUN 13 04/23/2021    CREATININE 0.6 04/23/2021    GLUCOSE 120 (H) 04/23/2021    CALCIUM 8.9 04/23/2021    PROT 7.0 02/26/2021    LABALBU 4.0 02/26/2021    BILITOT 1.0 02/26/2021    ALKPHOS 61 02/26/2021    AST 33 02/26/2021    ALT 21 02/26/2021    LABGLOM >60 04/23/2021    GFRAA >60 04/23/2021    AGRATIO 1.3 02/26/2021    GLOB 3.0 02/26/2021         Lab Results   Component Value Date    CHOL 145 02/26/2021    CHOL 138 07/10/2020    CHOL 150 12/06/2019     Lab Results   Component Value Date    TRIG 128 02/26/2021    TRIG 131 07/10/2020    TRIG 124 12/06/2019     Lab Results   Component Value Date    HDL 42 02/26/2021    HDL 44 07/10/2020    HDL 49 12/06/2019     Lab Results   Component Value Date    LDLCALC 77 02/26/2021    LDLCALC 68 07/10/2020    LDLCALC 76 12/06/2019     Lab Results   Component Value Date    LABVLDL 26 02/26/2021    LABVLDL 26 07/10/2020    LABVLDL 25 12/06/2019     No results found for: [facility]    Lab Results   Component Value Date    INR 1.37 (H) 04/22/2021    INR 1.26 (H) 04/03/2020    INR 1.27 (H) 06/03/2019    PROTIME 15.9 (H) 04/22/2021    PROTIME 14.7 (H) 04/03/2020    PROTIME 14.5 (H) 06/03/2019       The ASCVD Risk score (Hemrilo Pathak, et al., 2013) failed to calculate for the following reasons: The 2013 ASCVD risk score is only valid for ages 36 to 78      Imaging:       Last ECG (if available):  NSR, RBBR, LAFB    Last Monitor/Holter    Last Stress (if available):    Last Cath (if available): 6/6/13  Angiographic Findings:  Left Main   Calcified without stenosis.     Left Anterior Descending   Calcified without luminal irregularities. One diagonal that was free of disease     Circumflex   Calcified with one marginal branch.  No obstruction noted.     Right Coronary  Non dominant and free of disease     Left Ventriculogram   Normal wall motion EF 55%     Hemodynamics   Left Ventricular Pressure 182/10  Central Aortic Pressure 124/60     Reccomendations   CT surgical evaluation for AVR    Last TTE/BRAYAN(if available): BRAYAN 4/22/21   Summary   Left ventricular cavity size is normal. There is mild to moderate concentric   left ventricular hypertrophy. Overall left ventricular systolic function   appears normal with an ejection fraction of 50%. No regional wall motion   abnormalities are noted. The mitral valve leaflets are thickened. Mitral   annular calcification is present. Mild mitral regurgitation is present. A   thickened bioprosthetic artificial aortic valve appears well seated. and   there is an echogenic mobile structure seen on the valve. There is severe   aortic valve insufficiency. Mild to moderate tricuspid regurgitation. A   bubble study was performed and showed no evidence of right to left shunting. No intracardiac thrombus was seen. TTE 4/16/21  Summary   Left ventricular cavity size is normal. There is mild to moderate concentric   left ventricular hypertrophy. Overall left ventricular systolic function   appears normal with an ejection fraction of 50-55%. No regional wall motion   abnormalities are noted. Diastolic filling parameters suggest grade II   diastolic dysfunction. The mitral valve leaflets are slightly thickened with   normal leaflet mobility. Mitral annular calcification is present. The peak   mitral valve velocity is 1.78 m/s and the mean pressure gradient is 4 mmHg. A bioprosthetic artificial aortic valve appears well seated with a maximum   velocity of 4.12m/s and a mean gradient of 41mmHg. Moderate aortic   regurgitation. Mild to moderate tricuspid regurgitation. Estimated pulmonary   artery systolic pressure is at 43 mmHg assuming a right atrial pressure of 8   mmHg. The right ventricle is normal in size. TAPSE measures 1.78 cm and the   RVS velocity measures 8.7cm/s. The left atrium is at the upper limits of   normal in size. IVC size is dilated (>2.1 cm) but collapses > 50% with   respiration consistent with elevated RA pressure (8 mmHg). There are small lesions on both the mitral and aortic valves suspicious for   vegetation(s). Last CMR  (if available):      Assessment / Plan:     Acute bacterial endocarditis  Antibiotics per ID. Repeat BRAYAN once she completes IV antibiotics. (She will let us know)  We will also consider performing a PET to assess surrounding tissue to prosthetic valve. S/P AVR (aortic valve replacement)  ASA    Nonrheumatic aortic valve insufficiency  Like related to infection. Wide pulse pressure. Continue to monitor. Follow up in 2 months. I had the opportunity to review the clinical symptoms and presentation of [place]. Patient's allergies and medications were reviewed and updated. Patient's past medical, surgical, social and family history were reviewed and updated. Patient's testing including laboratory, ECGs, monitor, imaging (TTE,BRAYAN,CMR,cath) were reviewed. Tobacco use was discussed with the patient and educated on the negative effects. I have asked the patient to not utilize these agents. All questions and concerns were addressed to the patient/family. Alternatives to my treatment were discussed. The note was completed using EMR. Every effort wasmade to ensure accuracy; however, inadvertent computerized transcription errors may be present. Thank you for allowing me to [facility] or [address] FINA Alatorre MD, [facility], [facility]

## 2021-05-10 LAB
ALBUMIN SERPL-MCNC: 3.3 G/DL
ALP BLD-CCNC: 67 U/L
ALT SERPL-CCNC: 19 U/L
ANION GAP SERPL CALCULATED.3IONS-SCNC: NORMAL MMOL/L
AST SERPL-CCNC: 34 U/L
BASOPHILS ABSOLUTE: 0.1 /ΜL
BASOPHILS RELATIVE PERCENT: 1.3 %
BILIRUB SERPL-MCNC: 1.3 MG/DL (ref 0.1–1.4)
BUN BLDV-MCNC: 13 MG/DL
CALCIUM SERPL-MCNC: 8.7 MG/DL
CHLORIDE BLD-SCNC: 107 MMOL/L
CO2: 26 MMOL/L
CREAT SERPL-MCNC: 0.64 MG/DL
EOSINOPHILS ABSOLUTE: 0.3 /ΜL
EOSINOPHILS RELATIVE PERCENT: 7.6 %
GFR CALCULATED: NORMAL
GLUCOSE BLD-MCNC: 75 MG/DL
HCT VFR BLD CALC: 40.5 % (ref 36–46)
HEMOGLOBIN: 13.8 G/DL (ref 12–16)
LYMPHOCYTES ABSOLUTE: 0.9 /ΜL
LYMPHOCYTES RELATIVE PERCENT: 19.1 %
MCH RBC QN AUTO: 31.4 PG
MCHC RBC AUTO-ENTMCNC: 34 G/DL
MCV RBC AUTO: 92.2 FL
MONOCYTES ABSOLUTE: 0.4 /ΜL
MONOCYTES RELATIVE PERCENT: 9.3 %
NEUTROPHILS ABSOLUTE: 2.8 /ΜL
NEUTROPHILS RELATIVE PERCENT: 62.7 %
PDW BLD-RTO: 14.3 %
PLATELET # BLD: 85 K/ΜL
PMV BLD AUTO: 10.8 FL
POTASSIUM SERPL-SCNC: 4.5 MMOL/L
RBC # BLD: 4.4 10^6/ΜL
SEDIMENTATION RATE, ERYTHROCYTE: 27
SODIUM BLD-SCNC: 141 MMOL/L
TOTAL PROTEIN: 6.3
WBC # BLD: 4.5 10^3/ML

## 2021-05-14 ENCOUNTER — TELEPHONE (OUTPATIENT)
Dept: INFECTIOUS DISEASES | Age: 86
End: 2021-05-14

## 2021-05-14 NOTE — TELEPHONE ENCOUNTER
Patient states she was doing \" FANTASTIC\", she haven't felt better in years she states. She feels great and wanted to let Dr. Hettie Carrel know that he is doing amazing job.

## 2021-05-17 ENCOUNTER — TELEPHONE (OUTPATIENT)
Dept: INFECTIOUS DISEASES | Age: 86
End: 2021-05-17

## 2021-05-17 LAB
BASOPHILS ABSOLUTE: 0.1 10*3/UL (ref 0–0.2)
BASOPHILS RELATIVE PERCENT: 1 %
C-REACTIVE PROTEIN WIDE RANGE: 2.3 MG/L (ref 0–5)
CULTURE, FUNGUS BLOOD: NORMAL
EOSINOPHILS ABSOLUTE: 0.3 10*3/UL (ref 0–0.5)
EOSINOPHILS RELATIVE PERCENT: 6.4 %
ERYTHROCYTE SEDIMENTATION RATE: 32 MM/HR
GRANULOCYTE ABSOLUTE COUNT: 3.6 10*3/UL (ref 1.5–7.8)
HCT VFR BLD CALC: 41 %
HEMOGLOBIN: 13.7 G/DL
LYMPHOCYTES ABSOLUTE: 0.9 10*3/UL (ref 0.8–3.9)
LYMPHOCYTES RELATIVE PERCENT: 17.1 %
MCH RBC QN AUTO: 30.9 PG (ref 27–33)
MCHC RBC AUTO-ENTMCNC: 33.5 G/DL (ref 32–36)
MCV RBC AUTO: 92.2 FL (ref 80–100)
MONOCYTES ABSOLUTE: 0.5 10*3/UL (ref 0.2–0.9)
MONOCYTES RELATIVE PERCENT: 9.2 %
PDW BLD-RTO: 14.5 % (ref 11–15)
PLATELET # BLD: 93 10*3/UL (ref 140–400)
PMV BLD AUTO: 10.9 FL (ref 7.5–11.5)
RBC # BLD: 4.44 10*6/UL
SEGMENTED NEUTROPHILS RELATIVE PERCENT: 66.3 %
WBC # BLD: 5.4 10*3/UL (ref 3.8–10.8)

## 2021-05-17 NOTE — TELEPHONE ENCOUNTER
Slime RN with [facility] called asking if it's ok to remove the PICC line after last dose on Meropenem on Wednesday, the 19th of May.  Also asking if you want her on PO antibiotics to prescribe and send refills  [facility] [address] [place] [address], [address] [place] [address] [phone]

## 2021-05-18 LAB
A/G RATIO: 1.1 (ref 1–2.1)
ALBUMIN SERPL-MCNC: 3.5 G/DL (ref 3.5–5)
ALP BLD-CCNC: 75 U/L (ref 33–140)
ALT SERPL-CCNC: 21 U/L
ANION GAP SERPL CALCULATED.3IONS-SCNC: 7 MMOL/L (ref 5–13)
AST SERPL-CCNC: 39 U/L
BILIRUB SERPL-MCNC: 1.2 MG/DL (ref 0.2–1.2)
BUN / CREAT RATIO: 21
BUN BLDV-MCNC: 14 MG/DL (ref 7–25)
CALCIUM SERPL-MCNC: 8.7 MG/DL (ref 8.4–10.5)
CHLORIDE BLD-SCNC: 107 MMOL/L (ref 98–110)
CO2: 26 MMOL/L (ref 22–29)
CREAT SERPL-MCNC: 0.66 MG/DL
GFR AFRICAN AMERICAN: NORMAL SEE NOTE
GFR NON-AFRICAN AMERICAN: NORMAL SEE NOTE
GLOBULIN: 3.1 G/DL (ref 2–3.7)
GLUCOSE BLD-MCNC: 92 MG/DL (ref 71–99)
POTASSIUM SERPL-SCNC: 4.4 MMOL/L (ref 3.5–5.1)
SODIUM BLD-SCNC: 140 MMOL/L (ref 135–146)
TOTAL PROTEIN: 6.6 G/DL (ref 6–8)

## 2021-05-19 NOTE — TELEPHONE ENCOUNTER
Gypsy [phone] [facility] [phone] called to ask if it's ok to discontinue the antibiotics and d/c the PICC line after today's dose.    Please advise

## 2021-05-20 RX ORDER — CIPROFLOXACIN 500 MG/1
500 TABLET, FILM COATED ORAL 2 TIMES DAILY
Qty: 60 TABLET | Refills: 5 | Status: SHIPPED | OUTPATIENT
Start: 2021-05-20 | End: 2021-06-19

## 2021-05-20 NOTE — TELEPHONE ENCOUNTER
Slime from [facility] called to let us know that she will remove PICC line today, also that patient doesn't have PO medication left to continue the therapy.  Please advise

## 2021-05-24 ENCOUNTER — TELEPHONE (OUTPATIENT)
Dept: FAMILY MEDICINE CLINIC | Age: 86
End: 2021-05-24

## 2021-05-24 DIAGNOSIS — I33.0 BACTERIAL ENDOCARDITIS, UNSPECIFIED CHRONICITY: Primary | ICD-10-CM

## 2021-05-24 NOTE — TELEPHONE ENCOUNTER
Is the pt request referral to Dr Branid Crawford?   I placed a referral  If other questions- let me know

## 2021-05-24 NOTE — TELEPHONE ENCOUNTER
----- Message from Randy Coreas sent at 5/24/2021 10:01 AM EDT -----  Subject: Referral Request    QUESTIONS   Reason for referral request? patient requesting a BRAYAN wants scheduled with   dr Cori Garcia   Has the physician seen you for this condition before? No   Preferred Specialist (if applicable)? [address] [place] [address]  Do you already have an appointment scheduled? No  Additional Information for Provider? none  ---------------------------------------------------------------------------  --------------  CALL BACK INFO  What is the best way for the office to contact you? OK to leave message on   voicemail  Preferred Call Back Phone Number?  [phone]

## 2021-06-07 DIAGNOSIS — R73.01 IFG (IMPAIRED FASTING GLUCOSE): ICD-10-CM

## 2021-06-07 RX ORDER — BLOOD SUGAR DIAGNOSTIC
STRIP MISCELLANEOUS
Qty: 100 STRIP | Refills: 3 | Status: SHIPPED | OUTPATIENT
Start: 2021-06-07

## 2021-06-23 ENCOUNTER — TELEPHONE (OUTPATIENT)
Dept: FAMILY MEDICINE CLINIC | Age: 86
End: 2021-06-23

## 2021-06-23 ENCOUNTER — TELEPHONE (OUTPATIENT)
Dept: CARDIOLOGY CLINIC | Age: 86
End: 2021-06-23

## 2021-06-25 ENCOUNTER — PREP FOR PROCEDURE (OUTPATIENT)
Dept: CARDIOLOGY CLINIC | Age: 86
End: 2021-06-25

## 2021-06-25 DIAGNOSIS — I35.1 NONRHEUMATIC AORTIC VALVE INSUFFICIENCY: Primary | ICD-10-CM

## 2021-06-25 RX ORDER — SODIUM CHLORIDE 0.9 % (FLUSH) 0.9 %
5-40 SYRINGE (ML) INJECTION EVERY 12 HOURS SCHEDULED
Status: CANCELLED | OUTPATIENT
Start: 2021-06-25

## 2021-06-25 RX ORDER — SODIUM CHLORIDE 9 MG/ML
25 INJECTION, SOLUTION INTRAVENOUS PRN
Status: CANCELLED | OUTPATIENT
Start: 2021-06-25

## 2021-06-25 RX ORDER — SODIUM CHLORIDE 9 MG/ML
INJECTION, SOLUTION INTRAVENOUS CONTINUOUS
Status: CANCELLED | OUTPATIENT
Start: 2021-06-25

## 2021-06-25 RX ORDER — SODIUM CHLORIDE 0.9 % (FLUSH) 0.9 %
5-40 SYRINGE (ML) INJECTION PRN
Status: CANCELLED | OUTPATIENT
Start: 2021-06-25

## 2021-06-25 NOTE — TELEPHONE ENCOUNTER
Pt scheduled for 7/2/21. Spoke with pt regarding procedure. Pre-op instructions, time and location of arrival discussed. Pt has had COVID vaccine. Pt verbalized understanding and all questions answered at this time.

## 2021-07-01 NOTE — PRE-PROCEDURE INSTRUCTIONS
Called patient about procedure. Told to be here at 1000 for procedure at 1130. Must be NPO after midnight but can take morning medication with sips of water. Told to have a responsible adult with them to take them home and stay with them afterwards, if they do not get admitted to hospital. Also, to bring a current list of medications. No other questions or concerns.

## 2021-07-02 ENCOUNTER — ANESTHESIA (OUTPATIENT)
Dept: CARDIAC CATH/INVASIVE PROCEDURES | Age: 86
End: 2021-07-02
Payer: MEDICARE

## 2021-07-02 ENCOUNTER — HOSPITAL ENCOUNTER (OUTPATIENT)
Dept: CARDIAC CATH/INVASIVE PROCEDURES | Age: 86
Discharge: HOME | End: 2021-07-02
Attending: INTERNAL MEDICINE | Admitting: INTERNAL MEDICINE
Payer: MEDICARE

## 2021-07-02 ENCOUNTER — ANESTHESIA EVENT (OUTPATIENT)
Dept: CARDIAC CATH/INVASIVE PROCEDURES | Age: 86
End: 2021-07-02
Payer: MEDICARE

## 2021-07-02 VITALS — OXYGEN SATURATION: 99 % | DIASTOLIC BLOOD PRESSURE: 46 MMHG | SYSTOLIC BLOOD PRESSURE: 130 MMHG

## 2021-07-02 VITALS — WEIGHT: 160 LBS | HEIGHT: 64 IN | TEMPERATURE: 98.7 F | BODY MASS INDEX: 27.31 KG/M2

## 2021-07-02 DIAGNOSIS — I35.1 NONRHEUMATIC AORTIC VALVE INSUFFICIENCY: ICD-10-CM

## 2021-07-02 LAB
EKG ATRIAL RATE: 66 BPM
EKG DIAGNOSIS: NORMAL
EKG P AXIS: 51 DEGREES
EKG P-R INTERVAL: 172 MS
EKG Q-T INTERVAL: 462 MS
EKG QRS DURATION: 134 MS
EKG QTC CALCULATION (BAZETT): 484 MS
EKG R AXIS: -34 DEGREES
EKG T AXIS: 69 DEGREES
EKG VENTRICULAR RATE: 66 BPM
INR BLD: 1.3 (ref 0.86–1.14)

## 2021-07-02 PROCEDURE — 93325 DOPPLER ECHO COLOR FLOW MAPG: CPT | Performed by: INTERNAL MEDICINE

## 2021-07-02 PROCEDURE — 93312 ECHO TRANSESOPHAGEAL: CPT | Performed by: INTERNAL MEDICINE

## 2021-07-02 PROCEDURE — 3700000000 HC ANESTHESIA ATTENDED CARE

## 2021-07-02 PROCEDURE — 6360000002 HC RX W HCPCS

## 2021-07-02 PROCEDURE — 85610 PROTHROMBIN TIME: CPT

## 2021-07-02 PROCEDURE — 6360000002 HC RX W HCPCS: Performed by: NURSE ANESTHETIST, CERTIFIED REGISTERED

## 2021-07-02 PROCEDURE — 3700000001 HC ADD 15 MINUTES (ANESTHESIA)

## 2021-07-02 PROCEDURE — 93005 ELECTROCARDIOGRAM TRACING: CPT | Performed by: INTERNAL MEDICINE

## 2021-07-02 PROCEDURE — 93010 ELECTROCARDIOGRAM REPORT: CPT | Performed by: INTERNAL MEDICINE

## 2021-07-02 PROCEDURE — 93320 DOPPLER ECHO COMPLETE: CPT | Performed by: INTERNAL MEDICINE

## 2021-07-02 PROCEDURE — 2580000003 HC RX 258: Performed by: INTERNAL MEDICINE

## 2021-07-02 RX ORDER — SODIUM CHLORIDE 0.9 % (FLUSH) 0.9 %
5-40 SYRINGE (ML) INJECTION PRN
Status: DISCONTINUED | OUTPATIENT
Start: 2021-07-02 | End: 2021-07-02 | Stop reason: HOSPADM

## 2021-07-02 RX ORDER — SODIUM CHLORIDE 9 MG/ML
INJECTION, SOLUTION INTRAVENOUS CONTINUOUS
Status: DISCONTINUED | OUTPATIENT
Start: 2021-07-02 | End: 2021-07-02 | Stop reason: HOSPADM

## 2021-07-02 RX ORDER — PROPOFOL 10 MG/ML
INJECTION, EMULSION INTRAVENOUS PRN
Status: DISCONTINUED | OUTPATIENT
Start: 2021-07-02 | End: 2021-07-02 | Stop reason: SDUPTHER

## 2021-07-02 RX ORDER — SODIUM CHLORIDE 9 MG/ML
25 INJECTION, SOLUTION INTRAVENOUS PRN
Status: DISCONTINUED | OUTPATIENT
Start: 2021-07-02 | End: 2021-07-02 | Stop reason: HOSPADM

## 2021-07-02 RX ORDER — SODIUM CHLORIDE 0.9 % (FLUSH) 0.9 %
5-40 SYRINGE (ML) INJECTION EVERY 12 HOURS SCHEDULED
Status: DISCONTINUED | OUTPATIENT
Start: 2021-07-02 | End: 2021-07-02 | Stop reason: HOSPADM

## 2021-07-02 RX ADMIN — PROPOFOL 50 MG: 10 INJECTION, EMULSION INTRAVENOUS at 12:00

## 2021-07-02 RX ADMIN — PROPOFOL 100 MG: 10 INJECTION, EMULSION INTRAVENOUS at 11:47

## 2021-07-02 RX ADMIN — PROPOFOL 50 MG: 10 INJECTION, EMULSION INTRAVENOUS at 11:55

## 2021-07-02 RX ADMIN — SODIUM CHLORIDE: 9 INJECTION, SOLUTION INTRAVENOUS at 11:40

## 2021-07-02 ASSESSMENT — ENCOUNTER SYMPTOMS: SHORTNESS OF BREATH: 1

## 2021-07-02 NOTE — ANESTHESIA POSTPROCEDURE EVALUATION
Department of Anesthesiology  Postprocedure Note    Patient: Dakotah Chowdary  MRN: [id number]  YOB: 1935  Date of evaluation: 7/2/2021  Time:  4:54 PM     Procedure Summary     Date: 07/02/21 Room / Location: [facility] Cath Lab    Anesthesia Start: 1143 Anesthesia Stop: 1577    Procedure: [facility] BRAYAN W ECHO AND ANES Diagnosis: Nonrheumatic aortic (valve) insufficiency    Scheduled Providers: Ketty John MD Responsible Provider: Ketty John MD    Anesthesia Type: MAC ASA Status: 3          Anesthesia Type: MAC    Jenny Phase I:      Jenny Phase II:      Last vitals: Reviewed and per EMR flowsheets.        Anesthesia Post Evaluation    Patient location during evaluation: bedside  Patient participation: complete - patient participated  Level of consciousness: awake  Pain score: 0  Airway patency: patent  Nausea & Vomiting: no nausea and no vomiting  Complications: no  Cardiovascular status: hemodynamically stable  Respiratory status: acceptable  Hydration status: euvolemic

## 2021-07-02 NOTE — PROGRESS NOTES
PRE BRAYAN SEDATION     Pre procedural Sedation Assessment Note - BRAYAN    Pre - procedure Diagnosis:  AV endocarditis    Planned procedure:  BRAYAN    H&P reviewed   Opal Keen is a 80 y.o. female with a past medical hx of AV endo who presents for follow after completion of abx. Will proceed with BRAYAN. 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     Aortic stenosis 12/9/2011    dx 11/2007 - echo - LVEF 55% , AV area 1.5 cm , peak velocity - 27 ECHO 6/2009 - LVEF 60%, AV area 1.3 cm , peak velocity 35  ECHO 12/1/2011 - LVEF -60-65%, AV area 0.99 cm , peak velocity 34  asymptomatic      Benign neoplasm of colon     Cancer ([facility] [address]     cervical 1964    Carcinoma in situ of cervix uteri     Coronary atherosclerosis of unspecified type of vessel, native or graft     Essential hypertension, benign     History of kidney stones     Internal hemorrhoids without mention of complication     MI, old 2007    Other and unspecified hyperlipidemia     Palpitations     Peptic ulcer, unspecified site, unspecified as acute or chronic, without mention of hemorrhage, perforation, or obstruction     Sebaceous cyst of breast 5/31/2012    Trigger finger 1/8/2013    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  pantoprazole (PROTONIX) 40 MG tablet, Take 40 mg by mouth daily as needed (heartburn symptoms)  magnesium (MAGNESIUM-OXIDE) 250 MG TABS tablet, Take 250 mg by mouth daily   blood glucose test strips (ONE TOUCH ULTRA TEST) strip, 1 each by In Vitro route 2 times daily As needed. pravastatin (PRAVACHOL) 40 MG tablet, TAKE ONE TABLET BY MOUTH DAILY  amLODIPine (NORVASC) 2.5 MG tablet, Take 1 tablet by mouth daily  vitamin D (ERGOCALCIFEROL) 1.25 MG (84130 UT) CAPS capsule, TAKE ONE CAPSULE BY MOUTH ONCE WEEKLY  Ginkgo Biloba (GNP GINGKO BILOBA EXTRACT PO), Take 1 capsule by mouth daily   Multiple Vitamins-Minerals (ONE-A-DAY WOMENS 50 PLUS PO), Take 1 capsule by mouth daily  cetirizine (ZYRTEC) 10 MG tablet, Take 10 mg by mouth daily as needed for Allergies or Rhinitis   aspirin 81 MG tablet, Take 81 mg by mouth daily. Inpatient Medications:   sodium chloride flush  5-40 mL Intravenous 2 times per day       IV drips:   sodium chloride      sodium chloride         PRN:  sodium chloride, sodium chloride flush    Physical Examination   Vitals:    07/02/21 1042   Temp: 98.7 °F (37.1 °C)     General appearance - AAOX3  Chest - clear to auscultation  Heart - Regular heart sounds  Neck - No JVD    Airway assessment - Mallampati class - 3*    ASA classification - 3*    Labs:  No results for input(s): NA, K, BUN, CREATININE, CL, CO2, GLUCOSE, CALCIUM, MG in the last 72 hours. No results for input(s): WBC, HGB, HCT, PLT, MCV in the last 72 hours. No results for input(s): CHOLTOT, TRIG, HDL in the last 72 hours. Invalid input(s): LIPIDCOMM, CHOLHDL, VLDCHOL, LDL  No results for input(s): PTT, INR in the last 72 hours.     Invalid input(s): PT  No results for input(s): CKTOTAL, CKMB, CKMBINDEX, TROPONINI in the last 72 hours. No results for input(s): BNP in the last 72 hours. No results for input(s): NTPROBNP in the last 72 hours. No results for input(s): TSH in the last 72 hours. Planned anesthetic agent - provided by anesthesia    Sedation plan, risks, benefits. Potential complications and any alternative options associated with procedure and possible use of blood discussed with patient. Informed consent obtained. Plan:  Proceed with procedure    Juan Norris MD, [facility]    7/2/2021  10:44 AM

## 2021-07-02 NOTE — ANESTHESIA PRE PROCEDURE
Department of Anesthesiology  Preprocedure Note       Name:  Mekhi Pate   Age:  80 y.o.  :  1935                                          MRN:  [id number]         Date:  2021      Surgeon: * No surgeons listed *    Procedure: * No procedures listed *    Medications prior to admission:   Prior to Admission medications    Medication Sig Start Date End Date Taking? Authorizing Provider   magnesium (MAGNESIUM-OXIDE) 250 MG TABS tablet Take 250 mg by mouth daily    Yes Historical Provider, MD   pravastatin (PRAVACHOL) 40 MG tablet TAKE ONE TABLET BY MOUTH DAILY 20  Yes Iban Pires MD   amLODIPine (NORVASC) 2.5 MG tablet Take 1 tablet by mouth daily 20  Yes Iban Pires MD   vitamin D (ERGOCALCIFEROL) 1.25 MG (84217 UT) CAPS capsule TAKE ONE CAPSULE BY MOUTH ONCE WEEKLY 20  Yes Iban Pires MD   Multiple Vitamins-Minerals (ONE-A-DAY WOMENS 50 PLUS PO) Take 1 capsule by mouth daily   Yes Historical Provider, MD   cetirizine (ZYRTEC) 10 MG tablet Take 10 mg by mouth daily as needed for Allergies or Rhinitis    Yes Historical Provider, MD   aspirin 81 MG tablet Take 81 mg by mouth daily. Yes Historical Provider, MD   blood glucose test strips (ONETOUCH ULTRA) strip TEST 1-2 TIMES A DAY & AS NEEDED FOR SYMPTOMS OF IRREGULAR BLOOD GLUCOSE. 21   FELICIA Wong - CNP   pantoprazole (PROTONIX) 40 MG tablet Take 40 mg by mouth daily as needed (heartburn symptoms)    Historical Provider, MD   blood glucose test strips (ONE TOUCH ULTRA TEST) strip 1 each by In Vitro route 2 times daily As needed.  21   Iban Pires MD   Ginkgo Biloba (GNP GINGKO BILOBA EXTRACT PO) Take 1 capsule by mouth daily     Historical Provider, MD       Current medications:    Current Facility-Administered Medications   Medication Dose Route Frequency Provider Last Rate Last Admin    0.9 % sodium chloride infusion   Intravenous Continuous Serafin Whitney MD        0.9 % sodium chloride infusion  25 mL Intravenous PRN Beata Ennis MD        sodium chloride flush 0.9 % injection 5-40 mL  5-40 mL Intravenous 2 times per day Beata Ennis MD        sodium chloride flush 0.9 % injection 5-40 mL  5-40 mL Intravenous PRN Beata Ennis MD           Allergies:     Allergies   Allergen Reactions    Actos [Pioglitazone Hydrochloride]      angioedema     Lisinopril Other (See Comments)     Swelling of tongue    Penicillins Hives and Itching    Lipitor      myalgias    Zocor [Simvastatin] Other (See Comments)     Leg tingling , myalgias    Glucophage [Metformin Hydrochloride]      diarrhea       Problem List:    Patient Active Problem List   Diagnosis Code    Hypertension I10    Hypercholesteremia E78.00    Vitamin D deficiency E55.9    Coronary atherosclerosis I25.10    Transient cerebral ischemia G45.9    Asthma J45.909    Peptic ulcer K27.9    Benign neoplasm of colon D12.6    Allergic rhinitis J30.9    Internal hemorrhoids K64.8    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8    Hypervolemia E87.70    SOB (shortness of breath) R06.02    Acute bacterial endocarditis I33.0    Nonrheumatic aortic valve insufficiency I35.1       Past Medical History:        Diagnosis Date    Allergic rhinitis, cause unspecified     Allergic urticaria     Angioneurotic edema not elsewhere classified     Aortic stenosis 12/9/2011    dx 11/2007 - echo - LVEF 55% , AV area 1.5 cm , peak velocity - 27 ECHO 6/2009 - LVEF 60%, AV area 1.3 cm , peak velocity 35  ECHO 12/1/2011 - LVEF -60-65%, AV area 0.99 cm , peak velocity 34  asymptomatic      Benign neoplasm of colon     Cancer ([address]     cervical 1964    Carcinoma in situ of cervix uteri     Coronary atherosclerosis of unspecified type of vessel, native or graft     Essential hypertension, benign     History of kidney stones     Internal hemorrhoids without mention of complication     MI, old 2007    Other and unspecified hyperlipidemia     Palpitations     Peptic ulcer, unspecified site, unspecified as acute or chronic, without mention of hemorrhage, perforation, or obstruction     Sebaceous cyst of breast 5/31/2012    Trigger finger 1/8/2013    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 Cholo Barajas  6/6/2013     Dr. Na Asencio - coronaries WNL , LVEF WNL    Ed Flatness  5/2012    Dr. Nia Carrasco - bilateral    [facility] Dr. Sanna Piña    COLONOSCOPY  03/30/2011    DR. Mello  - Internal hemorrhoids, sigmoid diverticulosis, hyperplastic polyps. Repeat in five  years.  COLONOSCOPY  02/23/2009    nternal hemorrhoids, Diverticulosis, Colon polyps.   Repeat two years    COLONOSCOPY  5/8/2013     Dr. Lucinda Thorpe  - internal hemorrhoids , diverticulosis and polyps     COLONOSCOPY  5/4/2015, 3/7/2016    - hemorrhoids, diverticulosis, hyperplastic colon polyps, repeat in 1 year    COLONOSCOPY  1/28/2019    COLONOSCOPY WITH BIOPSY performed by Chad Fung MD at [address] [facility]  1/28/2019    COLONOSCOPY POLYPECTOMY ABLATION performed by Chad Fung MD at [address] [facility]  1/28/2019    COLONOSCOPY CONTROL HEMORRHAGE performed by Chad Fung MD at [address] [facility] [place]  [id number]     DR. Dacosta - CYSTOSCOPY RETROGRADE, RIGHT STENT PLACEMENT    OTHER SURGICAL HISTORY  6/27/2013    Dr. Antone Homans - [facility] URETEROSCOPY, RENAL STONE EXTRACTION    UPPER GASTROINTESTINAL ENDOSCOPY  3/7/2016    gastritis and esophagitis     UPPER GASTROINTESTINAL ENDOSCOPY N/A 1/28/2019    EGD BIOPSY performed by Chad Fung MD at [address] History:    Social History     Tobacco Use    Smoking status: Never Smoker    Smokeless tobacco: Never Used   Substance Use Topics    Alcohol use: Yes     Alcohol/week: 0.0 standard drinks     Comment: Occasional glass of wine                                Counseling given: Not Answered      Vital Signs (Current):   Vitals:    07/02/21 1042   Temp: 98.7 °F (37.1 °C)   TempSrc: Oral   Weight: 160 lb (72.6 kg)   Height: 5' 4\" (1.626 m)                                              BP Readings from Last 3 Encounters:   05/06/21 (!) 130/50   05/04/21 (!) 106/58   04/23/21 (!) 162/58       NPO Status:                                                                                 BMI:   Wt Readings from Last 3 Encounters:   07/02/21 160 lb (72.6 kg)   05/06/21 164 lb 6.4 oz (74.6 kg)   05/04/21 166 lb (75.3 kg)     Body mass index is 27.46 kg/m².     CBC:   Lab Results   Component Value Date    WBC 5.4 05/17/2021    RBC 4.40 05/10/2021    HGB 13.7 05/17/2021    HCT 41.0 05/17/2021    MCV 92.2 05/17/2021    RDW 14.5 05/17/2021    PLT 93 05/17/2021       CMP:   Lab Results   Component Value Date     05/17/2021    K 4.4 05/17/2021    K 4.2 04/23/2021     05/17/2021    CO2 26 05/17/2021    BUN 14 05/17/2021    CREATININE 0.66 05/17/2021    GFRAA SEE COMMENT 05/17/2021    GFRAA 129 06/06/2013    AGRATIO 1.1 05/17/2021    LABGLOM SEE COMMENT 05/17/2021    GLUCOSE 92 05/17/2021    PROT 6.6 05/17/2021    PROT 7.4 10/09/2012    CALCIUM 8.7 05/17/2021    BILITOT 1.2 05/17/2021    ALKPHOS 75 05/17/2021    AST 39 05/17/2021    ALT 21 05/17/2021       POC Tests: No results for input(s): POCGLU, POCNA, POCK, POCCL, POCBUN, POCHEMO, POCHCT in the last 72 hours. Coags:   Lab Results   Component Value Date    PROTIME 15.9 04/22/2021    INR 1.30 07/02/2021    APTT 35.3 05/09/2019       HCG (If Applicable): No results found for: PREGTESTUR, PREGSERUM, HCG, HCGQUANT     ABGs:   Lab Results   Component Value Date    PHART 7.357 07/13/2013    PO2ART 105 07/13/2013    LQK5DVR 36 07/13/2013    JRG7NGE 20.4 07/13/2013    BEART -5 07/13/2013    P3UIVXMC 98 07/13/2013        Type & Screen (If Applicable):  No results found for: LABABO, LABRH    Drug/Infectious Status (If Applicable):  No results found for: HIV, HEPCAB    COVID-19 Screening (If Applicable):   Lab Results   Component Value Date    COVID19 Not Detected 04/16/2021           Anesthesia Evaluation    Airway: Mallampati: II  TM distance: >3 FB   Neck ROM: full  Mouth opening: > = 3 FB Dental:          Pulmonary:   (+) shortness of breath:  asthma:                            Cardiovascular:  Exercise tolerance: good (>4 METS),   (+) hypertension:, valvular problems/murmurs: AS, past MI:, DUFFY:,                   Neuro/Psych:      (-) seizures and CVA           GI/Hepatic/Renal:   (+) GERD:,           Endo/Other:    (+) Diabetes, . Abdominal:             Vascular:           Other Findings:             Anesthesia Plan      MAC     ASA 3     (-npo mn  -h/o avr considered for vegitations , h/o bacteremia    Echo 2021  Conclusions      Summary   Left ventricular cavity size is normal. There is mild to moderate concentric   left ventricular hypertrophy. Overall left ventricular systolic function   appears normal with an ejection fraction of 50%. No regional wall motion   abnormalities are noted. The mitral valve leaflets are thickened. Mitral   annular calcification is present. Mild mitral regurgitation is present. A   thickened bioprosthetic artificial aortic valve appears well seated. and   there is an echogenic mobile structure seen on the valve. There is severe   aortic valve insufficiency. Mild to moderate tricuspid regurgitation. A   bubble study was performed and showed no evidence of right to left shunting. No intracardiac thrombus was seen.)  Induction: intravenous. Anesthetic plan and risks discussed with patient. Plan discussed with CRNA.                   Aracelis Martino MD   7/2/2021

## 2023-06-19 ENCOUNTER — HOSPITAL ENCOUNTER (OUTPATIENT)
Dept: CARDIAC REHAB | Age: 88
Setting detail: THERAPIES SERIES
Discharge: HOME | End: 2023-06-19
Payer: MEDICARE

## 2023-06-19 PROCEDURE — 93798 PHYS/QHP OP CAR RHAB W/ECG: CPT

## 2023-06-21 ENCOUNTER — APPOINTMENT (OUTPATIENT)
Dept: CARDIAC REHAB | Age: 88
End: 2023-06-21
Payer: MEDICARE

## 2023-06-23 ENCOUNTER — HOSPITAL ENCOUNTER (OUTPATIENT)
Dept: CARDIAC REHAB | Age: 88
Setting detail: THERAPIES SERIES
Discharge: HOME | End: 2023-06-23
Payer: MEDICARE

## 2023-06-23 PROCEDURE — 93798 PHYS/QHP OP CAR RHAB W/ECG: CPT

## 2023-06-26 ENCOUNTER — HOSPITAL ENCOUNTER (OUTPATIENT)
Dept: CARDIAC REHAB | Age: 88
Setting detail: THERAPIES SERIES
Discharge: HOME | End: 2023-06-26
Payer: MEDICARE

## 2023-06-26 PROCEDURE — 93798 PHYS/QHP OP CAR RHAB W/ECG: CPT

## 2023-06-28 ENCOUNTER — HOSPITAL ENCOUNTER (OUTPATIENT)
Dept: CARDIAC REHAB | Age: 88
Setting detail: THERAPIES SERIES
Discharge: HOME | End: 2023-06-28
Payer: MEDICARE

## 2023-06-28 PROCEDURE — 93798 PHYS/QHP OP CAR RHAB W/ECG: CPT

## 2023-06-30 ENCOUNTER — APPOINTMENT (OUTPATIENT)
Dept: CARDIAC REHAB | Age: 88
End: 2023-06-30
Payer: MEDICARE

## 2023-07-03 ENCOUNTER — APPOINTMENT (OUTPATIENT)
Dept: CARDIAC REHAB | Age: 88
End: 2023-07-03
Payer: MEDICARE

## 2023-07-05 ENCOUNTER — HOSPITAL ENCOUNTER (OUTPATIENT)
Dept: CARDIAC REHAB | Age: 88
Setting detail: THERAPIES SERIES
Discharge: HOME | End: 2023-07-05
Payer: MEDICARE

## 2023-07-05 PROCEDURE — 93798 PHYS/QHP OP CAR RHAB W/ECG: CPT

## 2023-07-07 ENCOUNTER — HOSPITAL ENCOUNTER (OUTPATIENT)
Dept: CARDIAC REHAB | Age: 88
Setting detail: THERAPIES SERIES
Discharge: HOME | End: 2023-07-07
Payer: MEDICARE

## 2023-07-07 PROCEDURE — 93798 PHYS/QHP OP CAR RHAB W/ECG: CPT

## 2023-07-10 ENCOUNTER — HOSPITAL ENCOUNTER (OUTPATIENT)
Dept: CARDIAC REHAB | Age: 88
Setting detail: THERAPIES SERIES
Discharge: HOME | End: 2023-07-10
Payer: MEDICARE

## 2023-07-10 PROCEDURE — 93798 PHYS/QHP OP CAR RHAB W/ECG: CPT

## 2023-07-12 ENCOUNTER — HOSPITAL ENCOUNTER (OUTPATIENT)
Dept: CARDIAC REHAB | Age: 88
Setting detail: THERAPIES SERIES
Discharge: HOME | End: 2023-07-12
Payer: MEDICARE

## 2023-07-12 PROCEDURE — 93798 PHYS/QHP OP CAR RHAB W/ECG: CPT

## 2023-07-14 ENCOUNTER — HOSPITAL ENCOUNTER (OUTPATIENT)
Dept: CARDIAC REHAB | Age: 88
Setting detail: THERAPIES SERIES
Discharge: HOME | End: 2023-07-14
Payer: MEDICARE

## 2023-07-14 PROCEDURE — 93798 PHYS/QHP OP CAR RHAB W/ECG: CPT

## 2023-07-17 ENCOUNTER — HOSPITAL ENCOUNTER (OUTPATIENT)
Dept: CARDIAC REHAB | Age: 88
Setting detail: THERAPIES SERIES
Discharge: HOME | End: 2023-07-17
Payer: MEDICARE

## 2023-07-17 PROCEDURE — 93798 PHYS/QHP OP CAR RHAB W/ECG: CPT

## 2023-07-19 ENCOUNTER — HOSPITAL ENCOUNTER (OUTPATIENT)
Dept: CARDIAC REHAB | Age: 88
Setting detail: THERAPIES SERIES
Discharge: HOME | End: 2023-07-19
Payer: MEDICARE

## 2023-07-19 PROCEDURE — 93798 PHYS/QHP OP CAR RHAB W/ECG: CPT

## 2023-07-21 ENCOUNTER — HOSPITAL ENCOUNTER (OUTPATIENT)
Dept: CARDIAC REHAB | Age: 88
Setting detail: THERAPIES SERIES
Discharge: HOME | End: 2023-07-21
Payer: MEDICARE

## 2023-07-21 PROCEDURE — 93798 PHYS/QHP OP CAR RHAB W/ECG: CPT

## 2023-07-24 ENCOUNTER — HOSPITAL ENCOUNTER (OUTPATIENT)
Dept: CARDIAC REHAB | Age: 88
Setting detail: THERAPIES SERIES
Discharge: HOME | End: 2023-07-24
Payer: MEDICARE

## 2023-07-24 PROCEDURE — 93798 PHYS/QHP OP CAR RHAB W/ECG: CPT

## 2023-07-26 ENCOUNTER — APPOINTMENT (OUTPATIENT)
Dept: CARDIAC REHAB | Age: 88
End: 2023-07-26
Payer: MEDICARE

## 2023-07-28 ENCOUNTER — HOSPITAL ENCOUNTER (OUTPATIENT)
Dept: CARDIAC REHAB | Age: 88
Setting detail: THERAPIES SERIES
Discharge: HOME | End: 2023-07-28
Payer: MEDICARE

## 2023-07-28 PROCEDURE — 93798 PHYS/QHP OP CAR RHAB W/ECG: CPT

## 2023-07-31 ENCOUNTER — HOSPITAL ENCOUNTER (OUTPATIENT)
Dept: CARDIAC REHAB | Age: 88
Setting detail: THERAPIES SERIES
Discharge: HOME | End: 2023-07-31
Payer: MEDICARE

## 2023-07-31 PROCEDURE — 93798 PHYS/QHP OP CAR RHAB W/ECG: CPT

## 2023-08-02 ENCOUNTER — APPOINTMENT (OUTPATIENT)
Dept: CARDIAC REHAB | Age: 88
End: 2023-08-02
Payer: MEDICARE

## 2023-08-04 ENCOUNTER — HOSPITAL ENCOUNTER (OUTPATIENT)
Dept: CARDIAC REHAB | Age: 88
Setting detail: THERAPIES SERIES
Discharge: HOME | End: 2023-08-04
Payer: MEDICARE

## 2023-08-04 PROCEDURE — 93798 PHYS/QHP OP CAR RHAB W/ECG: CPT

## 2023-08-07 ENCOUNTER — APPOINTMENT (OUTPATIENT)
Dept: CARDIAC REHAB | Age: 88
End: 2023-08-07
Payer: MEDICARE

## 2023-08-09 ENCOUNTER — HOSPITAL ENCOUNTER (OUTPATIENT)
Dept: CARDIAC REHAB | Age: 88
Setting detail: THERAPIES SERIES
Discharge: HOME | End: 2023-08-09
Payer: MEDICARE

## 2023-08-09 PROCEDURE — 93798 PHYS/QHP OP CAR RHAB W/ECG: CPT

## 2023-08-11 ENCOUNTER — HOSPITAL ENCOUNTER (OUTPATIENT)
Dept: CARDIAC REHAB | Age: 88
Setting detail: THERAPIES SERIES
Discharge: HOME | End: 2023-08-11
Payer: MEDICARE

## 2023-08-11 PROCEDURE — 93798 PHYS/QHP OP CAR RHAB W/ECG: CPT

## 2023-08-14 ENCOUNTER — APPOINTMENT (OUTPATIENT)
Dept: CARDIAC REHAB | Age: 88
End: 2023-08-14
Payer: MEDICARE

## 2023-08-16 ENCOUNTER — APPOINTMENT (OUTPATIENT)
Dept: CARDIAC REHAB | Age: 88
End: 2023-08-16
Payer: MEDICARE

## 2023-08-18 ENCOUNTER — HOSPITAL ENCOUNTER (OUTPATIENT)
Dept: CARDIAC REHAB | Age: 88
Setting detail: THERAPIES SERIES
Discharge: HOME | End: 2023-08-18
Payer: MEDICARE

## 2023-08-18 PROCEDURE — 93798 PHYS/QHP OP CAR RHAB W/ECG: CPT

## 2023-08-21 ENCOUNTER — APPOINTMENT (OUTPATIENT)
Dept: CARDIAC REHAB | Age: 88
End: 2023-08-21
Payer: MEDICARE

## 2023-08-23 ENCOUNTER — HOSPITAL ENCOUNTER (OUTPATIENT)
Dept: CARDIAC REHAB | Age: 88
Setting detail: THERAPIES SERIES
Discharge: HOME | End: 2023-08-23
Payer: MEDICARE

## 2023-08-23 PROCEDURE — 93798 PHYS/QHP OP CAR RHAB W/ECG: CPT

## 2023-08-25 ENCOUNTER — HOSPITAL ENCOUNTER (OUTPATIENT)
Dept: CARDIAC REHAB | Age: 88
Setting detail: THERAPIES SERIES
Discharge: HOME | End: 2023-08-25
Payer: MEDICARE

## 2023-08-25 PROCEDURE — 93798 PHYS/QHP OP CAR RHAB W/ECG: CPT

## 2023-08-28 ENCOUNTER — HOSPITAL ENCOUNTER (OUTPATIENT)
Dept: CARDIAC REHAB | Age: 88
Setting detail: THERAPIES SERIES
Discharge: HOME | End: 2023-08-28
Payer: MEDICARE

## 2023-08-28 PROCEDURE — 93798 PHYS/QHP OP CAR RHAB W/ECG: CPT

## 2023-08-30 ENCOUNTER — HOSPITAL ENCOUNTER (OUTPATIENT)
Dept: CARDIAC REHAB | Age: 88
Setting detail: THERAPIES SERIES
Discharge: HOME | End: 2023-08-30
Payer: MEDICARE

## 2023-08-30 PROCEDURE — 93798 PHYS/QHP OP CAR RHAB W/ECG: CPT

## 2023-09-01 ENCOUNTER — HOSPITAL ENCOUNTER (OUTPATIENT)
Dept: CARDIAC REHAB | Age: 88
Setting detail: THERAPIES SERIES
Discharge: HOME | End: 2023-09-01
Payer: MEDICARE

## 2023-09-01 PROCEDURE — 93798 PHYS/QHP OP CAR RHAB W/ECG: CPT

## 2023-09-06 ENCOUNTER — HOSPITAL ENCOUNTER (OUTPATIENT)
Dept: CARDIAC REHAB | Age: 88
Setting detail: THERAPIES SERIES
Discharge: HOME | End: 2023-09-06
Payer: MEDICARE

## 2023-09-06 PROCEDURE — 93798 PHYS/QHP OP CAR RHAB W/ECG: CPT

## 2023-09-08 ENCOUNTER — HOSPITAL ENCOUNTER (OUTPATIENT)
Dept: CARDIAC REHAB | Age: 88
Setting detail: THERAPIES SERIES
Discharge: HOME | End: 2023-09-08
Payer: MEDICARE

## 2023-09-08 PROCEDURE — 93798 PHYS/QHP OP CAR RHAB W/ECG: CPT

## 2023-09-11 ENCOUNTER — HOSPITAL ENCOUNTER (OUTPATIENT)
Dept: CARDIAC REHAB | Age: 88
Setting detail: THERAPIES SERIES
Discharge: HOME | End: 2023-09-11
Payer: MEDICARE

## 2023-09-11 PROCEDURE — 93798 PHYS/QHP OP CAR RHAB W/ECG: CPT

## 2023-09-13 ENCOUNTER — HOSPITAL ENCOUNTER (OUTPATIENT)
Dept: CARDIAC REHAB | Age: 88
Setting detail: THERAPIES SERIES
Discharge: HOME | End: 2023-09-13
Payer: MEDICARE

## 2023-09-13 PROCEDURE — 93798 PHYS/QHP OP CAR RHAB W/ECG: CPT

## 2023-09-15 ENCOUNTER — HOSPITAL ENCOUNTER (OUTPATIENT)
Dept: CARDIAC REHAB | Age: 88
Setting detail: THERAPIES SERIES
Discharge: HOME | End: 2023-09-15
Payer: MEDICARE

## 2023-09-15 PROCEDURE — 93798 PHYS/QHP OP CAR RHAB W/ECG: CPT

## 2023-09-25 ENCOUNTER — HOSPITAL ENCOUNTER (OUTPATIENT)
Dept: CARDIAC REHAB | Age: 88
Setting detail: THERAPIES SERIES
Discharge: HOME | End: 2023-09-25
Payer: MEDICARE

## 2023-09-25 PROCEDURE — 93798 PHYS/QHP OP CAR RHAB W/ECG: CPT

## 2023-09-27 ENCOUNTER — HOSPITAL ENCOUNTER (OUTPATIENT)
Dept: CARDIAC REHAB | Age: 88
Setting detail: THERAPIES SERIES
Discharge: HOME | End: 2023-09-27
Payer: MEDICARE

## 2023-09-27 PROCEDURE — 93798 PHYS/QHP OP CAR RHAB W/ECG: CPT

## 2023-09-29 ENCOUNTER — HOSPITAL ENCOUNTER (OUTPATIENT)
Dept: CARDIAC REHAB | Age: 88
Setting detail: THERAPIES SERIES
Discharge: HOME | End: 2023-09-29
Payer: MEDICARE

## 2023-09-29 PROCEDURE — 93798 PHYS/QHP OP CAR RHAB W/ECG: CPT

## 2023-10-02 ENCOUNTER — HOSPITAL ENCOUNTER (OUTPATIENT)
Dept: CARDIAC REHAB | Age: 88
Setting detail: THERAPIES SERIES
Discharge: HOME | End: 2023-10-02
Payer: MEDICARE

## 2023-10-02 PROCEDURE — 93798 PHYS/QHP OP CAR RHAB W/ECG: CPT

## 2023-10-04 ENCOUNTER — HOSPITAL ENCOUNTER (OUTPATIENT)
Dept: CARDIAC REHAB | Age: 88
Setting detail: THERAPIES SERIES
Discharge: HOME | End: 2023-10-04
Payer: MEDICARE

## 2023-10-04 PROCEDURE — 93798 PHYS/QHP OP CAR RHAB W/ECG: CPT

## 2024-04-24 ENCOUNTER — HOSPITAL ENCOUNTER (OUTPATIENT)
Dept: OCCUPATIONAL THERAPY | Age: 89
Setting detail: THERAPIES SERIES
Discharge: HOME | End: 2024-04-24
Payer: MEDICARE

## 2024-04-24 PROCEDURE — 97165 OT EVAL LOW COMPLEX 30 MIN: CPT

## 2024-04-24 PROCEDURE — 97537 COMMUNITY/WORK REINTEGRATION: CPT

## 2024-04-24 NOTE — PROGRESS NOTES
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 Please review the attached evaluation and/or summary of the patient's plan of care, and verify that you agree therapy should continue by signing the attached document and sending it back to our office.    Plan of Care/Treatment to date:  [] Therapeutic Exercise   [] Modalities:  [] Therapeutic Activity    [] Ultrasound  [] Electrical Stimulation   [] Activities of Daily Living    [] Fluidotherapy [] Kinesiotaping  [] Neuromuscular Re-education   [] Iontophoresis [] Coldpack/hotpack   [] Instruction in HEP     [] Contrast Bath  [] Manual Therapy     Other:  [] Aquatic Therapy      [x] Resume driving      Frequency/Duration:  # Days per week: [x] 1 day # Weeks: [x] 1 week [] 5 weeks      [] 2 days   [] 2 weeks [] 6 weeks     [] 3 days   [] 3 weeks [] 7 weeks     [] 4 days   [] 4 weeks [] 8 weeks    Rehab Potential: [] excellent [x] good [] fair  [] poor       Electronically signed by:  ALECIA Tracy, SIMI      If you have any questions or concerns, please don't hesitate to call.  Thank you for your referral.      Physician Signature:________________________________Date:__________________  By signing above, therapist’s plan is approved by physician
